# Patient Record
Sex: FEMALE | Race: WHITE | Employment: FULL TIME
[De-identification: names, ages, dates, MRNs, and addresses within clinical notes are randomized per-mention and may not be internally consistent; named-entity substitution may affect disease eponyms.]

---

## 2017-02-16 ENCOUNTER — TELEPHONE (OUTPATIENT)
Dept: FAMILY MEDICINE CLINIC | Facility: CLINIC | Age: 52
End: 2017-02-16

## 2017-04-12 ENCOUNTER — TELEPHONE (OUTPATIENT)
Dept: FAMILY MEDICINE CLINIC | Age: 52
End: 2017-04-12

## 2017-05-14 ENCOUNTER — APPOINTMENT (OUTPATIENT)
Dept: GENERAL RADIOLOGY | Age: 52
End: 2017-05-14

## 2017-05-14 ENCOUNTER — HOSPITAL ENCOUNTER (EMERGENCY)
Age: 52
Discharge: HOME | End: 2017-05-14
Attending: EMERGENCY MEDICINE

## 2017-05-14 VITALS
BODY MASS INDEX: 33.32 KG/M2 | DIASTOLIC BLOOD PRESSURE: 78 MMHG | TEMPERATURE: 98.8 F | OXYGEN SATURATION: 96 % | WEIGHT: 200 LBS | SYSTOLIC BLOOD PRESSURE: 132 MMHG | HEART RATE: 75 BPM | RESPIRATION RATE: 16 BRPM | HEIGHT: 65 IN

## 2017-05-14 DIAGNOSIS — R07.89 OTHER CHEST PAIN: Primary | ICD-10-CM

## 2017-05-14 DIAGNOSIS — E87.6 HYPOKALEMIA: ICD-10-CM

## 2017-05-14 DIAGNOSIS — E87.1 HYPONATREMIA: ICD-10-CM

## 2017-05-14 LAB
ABSOLUTE EOS #: 0.21 K/UL (ref 0–0.4)
ABSOLUTE LYMPH #: 6.36 K/UL (ref 1–4.8)
ABSOLUTE MONO #: 0.11 K/UL (ref 0.1–0.8)
ALBUMIN SERPL-MCNC: 4 G/DL (ref 3.5–5.2)
ALBUMIN/GLOBULIN RATIO: 1.2 (ref 1–2.5)
ALP BLD-CCNC: 86 U/L (ref 35–104)
ALT SERPL-CCNC: 15 U/L (ref 5–33)
ANION GAP SERPL CALCULATED.3IONS-SCNC: 12 MMOL/L (ref 9–17)
AST SERPL-CCNC: 20 U/L
BASOPHILS # BLD: 0 %
BASOPHILS ABSOLUTE: 0 K/UL (ref 0–0.2)
BILIRUB SERPL-MCNC: 0.18 MG/DL (ref 0.3–1.2)
BILIRUBIN DIRECT: <0.08 MG/DL
BILIRUBIN, INDIRECT: ABNORMAL MG/DL (ref 0–1)
BUN BLDV-MCNC: 10 MG/DL (ref 6–20)
BUN/CREAT BLD: ABNORMAL (ref 9–20)
CALCIUM SERPL-MCNC: 8.6 MG/DL (ref 8.6–10.4)
CHLORIDE BLD-SCNC: 99 MMOL/L (ref 98–107)
CO2: 23 MMOL/L (ref 20–31)
CREAT SERPL-MCNC: 0.55 MG/DL (ref 0.5–0.9)
DIFFERENTIAL TYPE: ABNORMAL
EOSINOPHILS RELATIVE PERCENT: 2 %
GFR AFRICAN AMERICAN: >60 ML/MIN
GFR NON-AFRICAN AMERICAN: >60 ML/MIN
GFR SERPL CREATININE-BSD FRML MDRD: ABNORMAL ML/MIN/{1.73_M2}
GFR SERPL CREATININE-BSD FRML MDRD: ABNORMAL ML/MIN/{1.73_M2}
GLOBULIN: ABNORMAL G/DL (ref 1.5–3.8)
GLUCOSE BLD-MCNC: 88 MG/DL (ref 70–99)
HCT VFR BLD CALC: 42.2 % (ref 36–46)
HEMOGLOBIN: 14 G/DL (ref 12–16)
LIPASE: 39 U/L (ref 13–60)
LYMPHOCYTES # BLD: 60 %
MCH RBC QN AUTO: 28.2 PG (ref 26–34)
MCHC RBC AUTO-ENTMCNC: 33.2 G/DL (ref 31–37)
MCV RBC AUTO: 85 FL (ref 80–100)
MONOCYTES # BLD: 1 %
MORPHOLOGY: NORMAL
PDW BLD-RTO: 14.3 % (ref 12.5–15.4)
PLATELET # BLD: 228 K/UL (ref 140–450)
PLATELET ESTIMATE: ABNORMAL
PMV BLD AUTO: 9.4 FL (ref 6–12)
POC TROPONIN I: 0 NG/ML (ref 0–0.1)
POC TROPONIN I: 0 NG/ML (ref 0–0.1)
POC TROPONIN INTERP: NORMAL
POC TROPONIN INTERP: NORMAL
POTASSIUM SERPL-SCNC: 3.6 MMOL/L (ref 3.7–5.3)
RBC # BLD: 4.96 M/UL (ref 4–5.2)
RBC # BLD: ABNORMAL 10*6/UL
SEG NEUTROPHILS: 37 %
SEGMENTED NEUTROPHILS ABSOLUTE COUNT: 3.92 K/UL (ref 1.8–7.7)
SODIUM BLD-SCNC: 134 MMOL/L (ref 135–144)
TOTAL PROTEIN: 7.3 G/DL (ref 6.4–8.3)
WBC # BLD: 10.6 K/UL (ref 3.5–11)
WBC # BLD: ABNORMAL 10*3/UL

## 2017-05-14 PROCEDURE — 85025 COMPLETE CBC W/AUTO DIFF WBC: CPT

## 2017-05-14 PROCEDURE — 80076 HEPATIC FUNCTION PANEL: CPT

## 2017-05-14 PROCEDURE — 93005 ELECTROCARDIOGRAM TRACING: CPT

## 2017-05-14 PROCEDURE — 80048 BASIC METABOLIC PNL TOTAL CA: CPT

## 2017-05-14 PROCEDURE — 71020 XR CHEST STANDARD TWO VW: CPT

## 2017-05-14 PROCEDURE — 84484 ASSAY OF TROPONIN QUANT: CPT

## 2017-05-14 PROCEDURE — 83690 ASSAY OF LIPASE: CPT

## 2017-05-14 PROCEDURE — 6370000000 HC RX 637 (ALT 250 FOR IP): Performed by: EMERGENCY MEDICINE

## 2017-05-14 PROCEDURE — 99285 EMERGENCY DEPT VISIT HI MDM: CPT

## 2017-05-14 RX ORDER — FAMOTIDINE 20 MG/1
20 TABLET, FILM COATED ORAL 2 TIMES DAILY
Qty: 60 TABLET | Refills: 0 | Status: SHIPPED | OUTPATIENT
Start: 2017-05-14 | End: 2017-05-19 | Stop reason: DRUGHIGH

## 2017-05-14 RX ORDER — FAMOTIDINE 20 MG/1
20 TABLET, FILM COATED ORAL ONCE
Status: COMPLETED | OUTPATIENT
Start: 2017-05-14 | End: 2017-05-14

## 2017-05-14 RX ADMIN — FAMOTIDINE 20 MG: 20 TABLET, FILM COATED ORAL at 12:59

## 2017-05-14 ASSESSMENT — PAIN DESCRIPTION - ORIENTATION: ORIENTATION: MID;LOWER

## 2017-05-14 ASSESSMENT — ENCOUNTER SYMPTOMS
CONSTIPATION: 0
SHORTNESS OF BREATH: 0
VOMITING: 0
DIARRHEA: 0
COLOR CHANGE: 0
NAUSEA: 0
COUGH: 0
ABDOMINAL PAIN: 1

## 2017-05-14 ASSESSMENT — PAIN DESCRIPTION - DESCRIPTORS: DESCRIPTORS: BURNING

## 2017-05-14 ASSESSMENT — PAIN DESCRIPTION - LOCATION: LOCATION: CHEST

## 2017-05-14 ASSESSMENT — PAIN DESCRIPTION - FREQUENCY: FREQUENCY: CONTINUOUS

## 2017-05-14 ASSESSMENT — PAIN DESCRIPTION - PAIN TYPE: TYPE: ACUTE PAIN

## 2017-05-14 ASSESSMENT — PAIN SCALES - GENERAL: PAINLEVEL_OUTOF10: 10

## 2017-05-15 LAB
EKG ATRIAL RATE: 76 BPM
EKG P AXIS: -4 DEGREES
EKG P-R INTERVAL: 144 MS
EKG Q-T INTERVAL: 392 MS
EKG QRS DURATION: 74 MS
EKG QTC CALCULATION (BAZETT): 441 MS
EKG R AXIS: 15 DEGREES
EKG T AXIS: 17 DEGREES
EKG VENTRICULAR RATE: 76 BPM

## 2017-05-19 ENCOUNTER — OFFICE VISIT (OUTPATIENT)
Dept: FAMILY MEDICINE CLINIC | Age: 52
End: 2017-05-19

## 2017-05-19 VITALS
TEMPERATURE: 97.7 F | SYSTOLIC BLOOD PRESSURE: 126 MMHG | HEART RATE: 80 BPM | WEIGHT: 208 LBS | DIASTOLIC BLOOD PRESSURE: 74 MMHG | HEIGHT: 65 IN | BODY MASS INDEX: 34.66 KG/M2

## 2017-05-19 DIAGNOSIS — K21.9 GASTROESOPHAGEAL REFLUX DISEASE WITHOUT ESOPHAGITIS: Primary | ICD-10-CM

## 2017-05-19 PROCEDURE — 99212 OFFICE O/P EST SF 10 MIN: CPT | Performed by: FAMILY MEDICINE

## 2017-05-19 RX ORDER — FAMOTIDINE 20 MG/1
20 TABLET, FILM COATED ORAL DAILY
COMMUNITY
End: 2019-03-05

## 2017-05-19 ASSESSMENT — PATIENT HEALTH QUESTIONNAIRE - PHQ9
2. FEELING DOWN, DEPRESSED OR HOPELESS: 0
SUM OF ALL RESPONSES TO PHQ QUESTIONS 1-9: 0
1. LITTLE INTEREST OR PLEASURE IN DOING THINGS: 0
SUM OF ALL RESPONSES TO PHQ9 QUESTIONS 1 & 2: 0

## 2017-05-19 ASSESSMENT — ENCOUNTER SYMPTOMS
ABDOMINAL PAIN: 0
SORE THROAT: 0
NAUSEA: 0
SHORTNESS OF BREATH: 1
WHEEZING: 0

## 2017-09-19 ENCOUNTER — TELEPHONE (OUTPATIENT)
Dept: FAMILY MEDICINE CLINIC | Age: 52
End: 2017-09-19

## 2018-06-18 ENCOUNTER — HOSPITAL ENCOUNTER (EMERGENCY)
Age: 53
Discharge: HOME | End: 2018-06-18
Attending: EMERGENCY MEDICINE

## 2018-06-18 VITALS
TEMPERATURE: 98.4 F | HEART RATE: 87 BPM | HEIGHT: 64 IN | WEIGHT: 206 LBS | BODY MASS INDEX: 35.17 KG/M2 | OXYGEN SATURATION: 96 % | SYSTOLIC BLOOD PRESSURE: 165 MMHG | DIASTOLIC BLOOD PRESSURE: 86 MMHG | RESPIRATION RATE: 16 BRPM

## 2018-06-18 DIAGNOSIS — M54.50 CHRONIC BILATERAL LOW BACK PAIN WITHOUT SCIATICA: Primary | ICD-10-CM

## 2018-06-18 DIAGNOSIS — G89.29 CHRONIC BILATERAL LOW BACK PAIN WITHOUT SCIATICA: Primary | ICD-10-CM

## 2018-06-18 LAB
-: ABNORMAL
AMORPHOUS: ABNORMAL
BACTERIA: ABNORMAL
BILIRUBIN URINE: NEGATIVE
CASTS UA: ABNORMAL /LPF (ref 0–8)
COLOR: YELLOW
CRYSTALS, UA: ABNORMAL /HPF
EPITHELIAL CELLS UA: ABNORMAL /HPF (ref 0–5)
GLUCOSE URINE: NEGATIVE
KETONES, URINE: NEGATIVE
LEUKOCYTE ESTERASE, URINE: NEGATIVE
MUCUS: ABNORMAL
NITRITE, URINE: NEGATIVE
OTHER OBSERVATIONS UA: ABNORMAL
PH UA: 5 (ref 5–8)
PROTEIN UA: NEGATIVE
RBC UA: ABNORMAL /HPF (ref 0–4)
RENAL EPITHELIAL, UA: ABNORMAL /HPF
SPECIFIC GRAVITY UA: 1.02 (ref 1–1.03)
TRICHOMONAS: ABNORMAL
TURBIDITY: CLEAR
URINE HGB: NEGATIVE
UROBILINOGEN, URINE: NORMAL
WBC UA: ABNORMAL /HPF (ref 0–5)
YEAST: ABNORMAL

## 2018-06-18 PROCEDURE — 81001 URINALYSIS AUTO W/SCOPE: CPT

## 2018-06-18 PROCEDURE — 6360000002 HC RX W HCPCS: Performed by: EMERGENCY MEDICINE

## 2018-06-18 PROCEDURE — 96372 THER/PROPH/DIAG INJ SC/IM: CPT

## 2018-06-18 PROCEDURE — 99283 EMERGENCY DEPT VISIT LOW MDM: CPT

## 2018-06-18 PROCEDURE — 87086 URINE CULTURE/COLONY COUNT: CPT

## 2018-06-18 RX ORDER — ORPHENADRINE CITRATE 30 MG/ML
60 INJECTION INTRAMUSCULAR; INTRAVENOUS ONCE
Status: COMPLETED | OUTPATIENT
Start: 2018-06-18 | End: 2018-06-18

## 2018-06-18 RX ORDER — CYCLOBENZAPRINE HCL 10 MG
10 TABLET ORAL 3 TIMES DAILY PRN
Qty: 15 TABLET | Refills: 0 | Status: SHIPPED | OUTPATIENT
Start: 2018-06-18 | End: 2018-06-28

## 2018-06-18 RX ADMIN — ORPHENADRINE CITRATE 60 MG: 30 INJECTION INTRAMUSCULAR; INTRAVENOUS at 11:15

## 2018-06-18 ASSESSMENT — PAIN SCALES - GENERAL: PAINLEVEL_OUTOF10: 9

## 2018-06-18 ASSESSMENT — PAIN DESCRIPTION - LOCATION: LOCATION: BACK

## 2018-06-18 ASSESSMENT — ENCOUNTER SYMPTOMS
NAUSEA: 0
BACK PAIN: 1
ABDOMINAL PAIN: 1
COUGH: 0
VOMITING: 0
SHORTNESS OF BREATH: 0

## 2018-06-18 ASSESSMENT — PAIN DESCRIPTION - ORIENTATION: ORIENTATION: LOWER

## 2018-06-18 ASSESSMENT — PAIN DESCRIPTION - PAIN TYPE: TYPE: CHRONIC PAIN

## 2018-06-18 ASSESSMENT — PAIN DESCRIPTION - DESCRIPTORS: DESCRIPTORS: SHARP

## 2018-06-19 LAB
CULTURE: NORMAL
Lab: NORMAL
SPECIMEN DESCRIPTION: NORMAL
STATUS: NORMAL

## 2018-06-21 ENCOUNTER — OFFICE VISIT (OUTPATIENT)
Dept: FAMILY MEDICINE CLINIC | Age: 53
End: 2018-06-21

## 2018-06-21 VITALS
HEART RATE: 82 BPM | RESPIRATION RATE: 18 BRPM | WEIGHT: 216 LBS | BODY MASS INDEX: 35.99 KG/M2 | TEMPERATURE: 98.7 F | OXYGEN SATURATION: 93 % | DIASTOLIC BLOOD PRESSURE: 78 MMHG | HEIGHT: 65 IN | SYSTOLIC BLOOD PRESSURE: 124 MMHG

## 2018-06-21 DIAGNOSIS — M54.50 MIDLINE LOW BACK PAIN WITHOUT SCIATICA, UNSPECIFIED CHRONICITY: Primary | ICD-10-CM

## 2018-06-21 PROCEDURE — 99212 OFFICE O/P EST SF 10 MIN: CPT | Performed by: FAMILY MEDICINE

## 2018-06-21 RX ORDER — IBUPROFEN 800 MG/1
800 TABLET ORAL EVERY 6 HOURS PRN
Qty: 30 TABLET | Refills: 0 | Status: SHIPPED | OUTPATIENT
Start: 2018-06-21 | End: 2018-08-05

## 2018-06-21 ASSESSMENT — ENCOUNTER SYMPTOMS
SORE THROAT: 0
BACK PAIN: 1
ABDOMINAL PAIN: 0
SHORTNESS OF BREATH: 0
NAUSEA: 0

## 2018-08-05 ENCOUNTER — HOSPITAL ENCOUNTER (EMERGENCY)
Age: 53
Discharge: HOME | End: 2018-08-05
Attending: EMERGENCY MEDICINE

## 2018-08-05 VITALS
OXYGEN SATURATION: 95 % | WEIGHT: 215 LBS | SYSTOLIC BLOOD PRESSURE: 126 MMHG | DIASTOLIC BLOOD PRESSURE: 95 MMHG | RESPIRATION RATE: 15 BRPM | BODY MASS INDEX: 35.82 KG/M2 | TEMPERATURE: 98 F | HEART RATE: 84 BPM | HEIGHT: 65 IN

## 2018-08-05 DIAGNOSIS — S29.012A RHOMBOID MUSCLE STRAIN, INITIAL ENCOUNTER: Primary | ICD-10-CM

## 2018-08-05 PROCEDURE — 96372 THER/PROPH/DIAG INJ SC/IM: CPT

## 2018-08-05 PROCEDURE — 6360000002 HC RX W HCPCS: Performed by: EMERGENCY MEDICINE

## 2018-08-05 PROCEDURE — 99282 EMERGENCY DEPT VISIT SF MDM: CPT

## 2018-08-05 RX ORDER — IBUPROFEN 600 MG/1
600 TABLET ORAL ONCE
Status: DISCONTINUED | OUTPATIENT
Start: 2018-08-05 | End: 2018-08-05

## 2018-08-05 RX ORDER — IBUPROFEN 600 MG/1
600 TABLET ORAL EVERY 6 HOURS PRN
Qty: 30 TABLET | Refills: 0 | Status: ON HOLD | OUTPATIENT
Start: 2018-08-05 | End: 2021-06-30 | Stop reason: ALTCHOICE

## 2018-08-05 RX ORDER — KETOROLAC TROMETHAMINE 30 MG/ML
60 INJECTION, SOLUTION INTRAMUSCULAR; INTRAVENOUS ONCE
Status: COMPLETED | OUTPATIENT
Start: 2018-08-05 | End: 2018-08-05

## 2018-08-05 RX ADMIN — KETOROLAC TROMETHAMINE 60 MG: 30 INJECTION, SOLUTION INTRAMUSCULAR at 19:21

## 2018-08-05 ASSESSMENT — ENCOUNTER SYMPTOMS
COUGH: 0
NAUSEA: 0
EYE REDNESS: 0
EYE PAIN: 0
RHINORRHEA: 0
ABDOMINAL PAIN: 0
SHORTNESS OF BREATH: 0
VOMITING: 0
BACK PAIN: 1

## 2018-08-05 ASSESSMENT — PAIN DESCRIPTION - ORIENTATION: ORIENTATION: RIGHT

## 2018-08-05 ASSESSMENT — PAIN SCALES - GENERAL
PAINLEVEL_OUTOF10: 7
PAINLEVEL_OUTOF10: 9

## 2018-08-05 ASSESSMENT — PAIN DESCRIPTION - PAIN TYPE: TYPE: ACUTE PAIN

## 2018-08-05 ASSESSMENT — PAIN DESCRIPTION - LOCATION: LOCATION: RIB CAGE

## 2018-08-05 ASSESSMENT — PAIN DESCRIPTION - DESCRIPTORS: DESCRIPTORS: CRAMPING

## 2018-08-05 NOTE — ED PROVIDER NOTES
[address] [facility] [address]  eMERGENCY dEPARTMENT eNCOUnter      Pt Name: Raeann Runner  MRN: [id number]  [place] 1965  Date of evaluation: 2018  PCP:    Escobar Brock MD      [address] [place] [address]       Chief Complaint   Patient presents with    Rib Pain     right sided         HISTORY OF PRESENT ILLNESS      Raeann Runner is a 47 y.o. female who presents For evaluation for back pain. Patient states she's affecting his left couple days. She states it's right the mid upper back. States it hurts when she moves her arm and back. Denies any specific trauma however she could've pulled something. Denies any radiation to the chest the chest or shortness of breath. Otherwise no other new complaints       REVIEW OF SYSTEMS         Review of Systems   Constitutional: Negative for chills and fever. HENT: Negative for congestion and rhinorrhea. Eyes: Negative for pain and redness. Respiratory: Negative for cough and shortness of breath. Cardiovascular: Negative for chest pain and palpitations. Gastrointestinal: Negative for abdominal pain, nausea and vomiting. Genitourinary: Negative for dysuria, flank pain and urgency. Musculoskeletal: Positive for back pain. Negative for myalgias, neck pain and neck stiffness. Skin: Negative for rash. Neurological: Negative for light-headedness and headaches. Hematological: Does not bruise/bleed easily.           PAST MEDICAL HISTORY     Past Medical History:   Diagnosis Date    Arthritis     Gastroesophageal reflux disease without esophagitis 2017    H/O vaginal hysterectomy 2006    Tobacco abuse     Vision abnormalities     glasses for reading       SURGICAL HISTORY       Past Surgical History:   Procedure Laterality Date    BREAST SURGERY Left     biopsy, neg     SECTION      x 3    COLONOSCOPY  8/27/15    Dr Misa Castillo Right     inguinal    HYSTERECTOMY      partial-still has one ovary    TONSILLECTOMY

## 2018-08-05 NOTE — LETTER
[facility] [address] [id number]  Phone: [phone]               August 5, 2018    Patient: Angel Bustillo   YOB: 1965   Date of Visit: 8/5/2018       To Whom It May Concern:    Rita Solomon was seen and treated in our emergency department on 8/5/2018. She strained a muscle and may return to work on 8/7/2018.     Sincerely,       Sobia Padilla RN         Signature:__________________________________

## 2018-10-25 ENCOUNTER — TELEPHONE (OUTPATIENT)
Dept: FAMILY MEDICINE CLINIC | Age: 53
End: 2018-10-25

## 2018-10-25 NOTE — TELEPHONE ENCOUNTER
I left a message to let her know she is due for her annual mammogram and to see if she is following Dr. Nabil Brown. Last visit with Dr. Nabil Brown is 5/2017.  sma Emi

## 2019-03-05 ENCOUNTER — OFFICE VISIT (OUTPATIENT)
Dept: OBGYN CLINIC | Age: 54
End: 2019-03-05
Payer: COMMERCIAL

## 2019-03-05 ENCOUNTER — HOSPITAL ENCOUNTER (OUTPATIENT)
Age: 54
Setting detail: SPECIMEN
Discharge: HOME | End: 2019-03-05
Payer: COMMERCIAL

## 2019-03-05 VITALS
WEIGHT: 216 LBS | DIASTOLIC BLOOD PRESSURE: 70 MMHG | HEART RATE: 82 BPM | HEIGHT: 65 IN | BODY MASS INDEX: 35.99 KG/M2 | SYSTOLIC BLOOD PRESSURE: 110 MMHG

## 2019-03-05 DIAGNOSIS — N95.2 ATROPHY OF VAGINA: ICD-10-CM

## 2019-03-05 DIAGNOSIS — N94.10 DYSPAREUNIA, FEMALE: ICD-10-CM

## 2019-03-05 DIAGNOSIS — N94.10 DYSPAREUNIA, FEMALE: Primary | ICD-10-CM

## 2019-03-05 DIAGNOSIS — Z00.00 PREVENTATIVE HEALTH CARE: ICD-10-CM

## 2019-03-05 DIAGNOSIS — Z90.710 H/O VAGINAL HYSTERECTOMY: ICD-10-CM

## 2019-03-05 LAB
BILIRUBIN URINE: NEGATIVE
COLOR: YELLOW
COMMENT UA: NORMAL
GLUCOSE URINE: NEGATIVE
KETONES, URINE: NEGATIVE
LEUKOCYTE ESTERASE, URINE: NEGATIVE
NITRITE, URINE: NEGATIVE
PH UA: 6 (ref 5–8)
PROTEIN UA: NEGATIVE
SPECIFIC GRAVITY UA: 1.01 (ref 1–1.03)
TURBIDITY: CLEAR
URINE HGB: NEGATIVE
UROBILINOGEN, URINE: NORMAL

## 2019-03-05 PROCEDURE — 87624 HPV HI-RISK TYP POOLED RSLT: CPT

## 2019-03-05 PROCEDURE — G0145 SCR C/V CYTO,THINLAYER,RESCR: HCPCS

## 2019-03-05 PROCEDURE — 99386 PREV VISIT NEW AGE 40-64: CPT | Performed by: OBSTETRICS & GYNECOLOGY

## 2019-03-05 PROCEDURE — 81003 URINALYSIS AUTO W/O SCOPE: CPT

## 2019-03-06 LAB
COMMENT: NORMAL
HPV SAMPLE: NORMAL
HPV, GENOTYPE 16: NOT DETECTED
HPV, GENOTYPE 18: NOT DETECTED
HPV, HIGH RISK OTHER: NOT DETECTED
HPV, INTERPRETATION: NORMAL
SPECIMEN DESCRIPTION: NORMAL

## 2019-03-15 LAB — CYTOLOGY REPORT: NORMAL

## 2019-04-08 ENCOUNTER — TELEPHONE (OUTPATIENT)
Dept: OBGYN CLINIC | Age: 54
End: 2019-04-08

## 2019-04-08 NOTE — TELEPHONE ENCOUNTER
Left message to call office. Pt has appointment 4-9-19 for f/u but has not completed the US ordered.

## 2020-05-28 ENCOUNTER — HOSPITAL ENCOUNTER (EMERGENCY)
Age: 55
Discharge: HOME | End: 2020-05-28
Attending: EMERGENCY MEDICINE

## 2020-05-28 ENCOUNTER — APPOINTMENT (OUTPATIENT)
Dept: GENERAL RADIOLOGY | Age: 55
End: 2020-05-28

## 2020-05-28 VITALS
RESPIRATION RATE: 18 BRPM | WEIGHT: 215 LBS | OXYGEN SATURATION: 96 % | BODY MASS INDEX: 35.82 KG/M2 | HEIGHT: 65 IN | SYSTOLIC BLOOD PRESSURE: 178 MMHG | TEMPERATURE: 97.8 F | DIASTOLIC BLOOD PRESSURE: 90 MMHG | HEART RATE: 90 BPM

## 2020-05-28 PROCEDURE — 73562 X-RAY EXAM OF KNEE 3: CPT

## 2020-05-28 PROCEDURE — 6370000000 HC RX 637 (ALT 250 FOR IP): Performed by: EMERGENCY MEDICINE

## 2020-05-28 PROCEDURE — 99283 EMERGENCY DEPT VISIT LOW MDM: CPT

## 2020-05-28 RX ORDER — HYDROCODONE BITARTRATE AND ACETAMINOPHEN 5; 325 MG/1; MG/1
1 TABLET ORAL EVERY 6 HOURS PRN
Qty: 6 TABLET | Refills: 0 | Status: SHIPPED | OUTPATIENT
Start: 2020-05-28 | End: 2020-05-30

## 2020-05-28 RX ORDER — HYDROCODONE BITARTRATE AND ACETAMINOPHEN 5; 325 MG/1; MG/1
1 TABLET ORAL ONCE
Status: COMPLETED | OUTPATIENT
Start: 2020-05-28 | End: 2020-05-28

## 2020-05-28 RX ADMIN — HYDROCODONE BITARTRATE AND ACETAMINOPHEN 1 TABLET: 5; 325 TABLET ORAL at 12:11

## 2020-05-28 ASSESSMENT — PAIN SCALES - GENERAL: PAINLEVEL_OUTOF10: 10

## 2020-05-28 ASSESSMENT — PAIN DESCRIPTION - LOCATION: LOCATION: KNEE

## 2020-05-28 ASSESSMENT — ENCOUNTER SYMPTOMS
SHORTNESS OF BREATH: 0
EYE PAIN: 0
NAUSEA: 0
BACK PAIN: 0
VOMITING: 0
COUGH: 0
RHINORRHEA: 0
ABDOMINAL PAIN: 0
EYE DISCHARGE: 0
COLOR CHANGE: 0
SORE THROAT: 0

## 2020-05-28 ASSESSMENT — PAIN DESCRIPTION - PAIN TYPE: TYPE: ACUTE PAIN

## 2020-05-28 ASSESSMENT — PAIN DESCRIPTION - ORIENTATION: ORIENTATION: LEFT

## 2020-05-28 NOTE — ED NOTES
Pt to ED via wheelchair with complaints of left knee pain and swelling. Pt states it started about 2 days ago and the swelling and pain has gotten worse. Pt denies injury to knee. Pt states she has a history of \"bad knees\" but has never had this type of swelling or pain. Pt with decreased ROM. Pt has full sensation and PMS intact. Vitals obtained. Awaiting further orders.      Darrick Shone, RN  05/28/20 9233

## 2020-05-29 ENCOUNTER — TELEPHONE (OUTPATIENT)
Dept: FAMILY MEDICINE CLINIC | Age: 55
End: 2020-05-29

## 2020-06-01 ENCOUNTER — TELEPHONE (OUTPATIENT)
Dept: FAMILY MEDICINE CLINIC | Age: 55
End: 2020-06-01

## 2021-06-29 ENCOUNTER — HOSPITAL ENCOUNTER (INPATIENT)
Age: 56
LOS: 3 days | Discharge: HOME | DRG: 247 | End: 2021-07-02
Attending: EMERGENCY MEDICINE | Admitting: EMERGENCY MEDICINE

## 2021-06-29 ENCOUNTER — APPOINTMENT (OUTPATIENT)
Dept: GENERAL RADIOLOGY | Age: 56
DRG: 247 | End: 2021-06-29

## 2021-06-29 DIAGNOSIS — R07.9 CHEST PAIN, UNSPECIFIED TYPE: Primary | ICD-10-CM

## 2021-06-29 LAB
ABSOLUTE EOS #: 0.13 K/UL (ref 0–0.4)
ABSOLUTE IMMATURE GRANULOCYTE: 0 K/UL (ref 0–0.3)
ABSOLUTE LYMPH #: 4.94 K/UL (ref 1–4.8)
ABSOLUTE MONO #: 0.78 K/UL (ref 0.1–0.8)
ANION GAP SERPL CALCULATED.3IONS-SCNC: 11 MMOL/L (ref 9–17)
BASOPHILS # BLD: 0 % (ref 0–2)
BASOPHILS ABSOLUTE: 0 K/UL (ref 0–0.2)
BNP INTERPRETATION: NORMAL
BUN BLDV-MCNC: 14 MG/DL (ref 6–20)
BUN/CREAT BLD: ABNORMAL (ref 9–20)
CALCIUM SERPL-MCNC: 9.1 MG/DL (ref 8.6–10.4)
CHLORIDE BLD-SCNC: 105 MMOL/L (ref 98–107)
CO2: 25 MMOL/L (ref 20–31)
CREAT SERPL-MCNC: 0.82 MG/DL (ref 0.5–0.9)
DIFFERENTIAL TYPE: ABNORMAL
EOSINOPHILS RELATIVE PERCENT: 1 % (ref 1–4)
GFR AFRICAN AMERICAN: >60 ML/MIN
GFR NON-AFRICAN AMERICAN: >60 ML/MIN
GFR SERPL CREATININE-BSD FRML MDRD: ABNORMAL ML/MIN/{1.73_M2}
GFR SERPL CREATININE-BSD FRML MDRD: ABNORMAL ML/MIN/{1.73_M2}
GLUCOSE BLD-MCNC: 170 MG/DL (ref 70–99)
HCT VFR BLD CALC: 43.3 % (ref 36.3–47.1)
HEMOGLOBIN: 13.7 G/DL (ref 11.9–15.1)
IMMATURE GRANULOCYTES: 0 %
LYMPHOCYTES # BLD: 38 % (ref 24–44)
MCH RBC QN AUTO: 27.7 PG (ref 25.2–33.5)
MCHC RBC AUTO-ENTMCNC: 31.6 G/DL (ref 28.4–34.8)
MCV RBC AUTO: 87.7 FL (ref 82.6–102.9)
MONOCYTES # BLD: 6 % (ref 1–7)
MORPHOLOGY: NORMAL
NRBC AUTOMATED: 0 PER 100 WBC
PDW BLD-RTO: 13.2 % (ref 11.8–14.4)
PLATELET # BLD: 280 K/UL (ref 138–453)
PLATELET ESTIMATE: ABNORMAL
PMV BLD AUTO: 10.6 FL (ref 8.1–13.5)
POTASSIUM SERPL-SCNC: 3.9 MMOL/L (ref 3.7–5.3)
PRO-BNP: 35 PG/ML
RBC # BLD: 4.94 M/UL (ref 3.95–5.11)
RBC # BLD: ABNORMAL 10*6/UL
SEG NEUTROPHILS: 55 % (ref 36–66)
SEGMENTED NEUTROPHILS ABSOLUTE COUNT: 7.15 K/UL (ref 1.8–7.7)
SODIUM BLD-SCNC: 141 MMOL/L (ref 135–144)
TROPONIN INTERP: NORMAL
TROPONIN T: NORMAL NG/ML
TROPONIN, HIGH SENSITIVITY: 11 NG/L (ref 0–14)
WBC # BLD: 13 K/UL (ref 3.5–11.3)
WBC # BLD: ABNORMAL 10*3/UL

## 2021-06-29 PROCEDURE — 85025 COMPLETE CBC W/AUTO DIFF WBC: CPT

## 2021-06-29 PROCEDURE — 83690 ASSAY OF LIPASE: CPT

## 2021-06-29 PROCEDURE — 99283 EMERGENCY DEPT VISIT LOW MDM: CPT

## 2021-06-29 PROCEDURE — 71046 X-RAY EXAM CHEST 2 VIEWS: CPT

## 2021-06-29 PROCEDURE — 6370000000 HC RX 637 (ALT 250 FOR IP): Performed by: STUDENT IN AN ORGANIZED HEALTH CARE EDUCATION/TRAINING PROGRAM

## 2021-06-29 PROCEDURE — 1200000000 HC SEMI PRIVATE

## 2021-06-29 PROCEDURE — 84484 ASSAY OF TROPONIN QUANT: CPT

## 2021-06-29 PROCEDURE — 83880 ASSAY OF NATRIURETIC PEPTIDE: CPT

## 2021-06-29 PROCEDURE — 80048 BASIC METABOLIC PNL TOTAL CA: CPT

## 2021-06-29 PROCEDURE — 93005 ELECTROCARDIOGRAM TRACING: CPT | Performed by: EMERGENCY MEDICINE

## 2021-06-29 RX ORDER — ASPIRIN 81 MG/1
324 TABLET, CHEWABLE ORAL ONCE
Status: COMPLETED | OUTPATIENT
Start: 2021-06-29 | End: 2021-06-29

## 2021-06-29 RX ADMIN — ASPIRIN 324 MG: 81 TABLET, CHEWABLE ORAL at 21:45

## 2021-06-29 ASSESSMENT — PAIN SCALES - GENERAL: PAINLEVEL_OUTOF10: 8

## 2021-06-29 ASSESSMENT — PAIN DESCRIPTION - PAIN TYPE: TYPE: ACUTE PAIN

## 2021-06-29 ASSESSMENT — PAIN DESCRIPTION - LOCATION: LOCATION: CHEST

## 2021-06-29 ASSESSMENT — PAIN DESCRIPTION - DESCRIPTORS: DESCRIPTORS: ACHING

## 2021-06-29 ASSESSMENT — HEART SCORE
ECG: 1
ECG: 1

## 2021-06-29 ASSESSMENT — PAIN DESCRIPTION - ORIENTATION: ORIENTATION: LEFT

## 2021-06-30 ENCOUNTER — APPOINTMENT (OUTPATIENT)
Dept: NUCLEAR MEDICINE | Age: 56
DRG: 247 | End: 2021-06-30

## 2021-06-30 LAB
EKG ATRIAL RATE: 72 BPM
EKG ATRIAL RATE: 87 BPM
EKG P AXIS: 30 DEGREES
EKG P AXIS: 34 DEGREES
EKG P-R INTERVAL: 154 MS
EKG P-R INTERVAL: 162 MS
EKG Q-T INTERVAL: 364 MS
EKG Q-T INTERVAL: 406 MS
EKG QRS DURATION: 72 MS
EKG QRS DURATION: 74 MS
EKG QTC CALCULATION (BAZETT): 438 MS
EKG QTC CALCULATION (BAZETT): 444 MS
EKG R AXIS: 35 DEGREES
EKG R AXIS: 41 DEGREES
EKG T AXIS: 57 DEGREES
EKG T AXIS: 60 DEGREES
EKG VENTRICULAR RATE: 72 BPM
EKG VENTRICULAR RATE: 87 BPM
LIPASE: 35 U/L (ref 13–60)
LV EF: 59 %
LVEF MODALITY: NORMAL
SARS-COV-2, RAPID: NOT DETECTED
SPECIMEN DESCRIPTION: NORMAL
TROPONIN INTERP: NORMAL
TROPONIN T: NORMAL NG/ML
TROPONIN, HIGH SENSITIVITY: 9 NG/L (ref 0–14)

## 2021-06-30 PROCEDURE — 93005 ELECTROCARDIOGRAM TRACING: CPT | Performed by: STUDENT IN AN ORGANIZED HEALTH CARE EDUCATION/TRAINING PROGRAM

## 2021-06-30 PROCEDURE — G0378 HOSPITAL OBSERVATION PER HR: HCPCS

## 2021-06-30 PROCEDURE — 2580000003 HC RX 258: Performed by: STUDENT IN AN ORGANIZED HEALTH CARE EDUCATION/TRAINING PROGRAM

## 2021-06-30 PROCEDURE — 2580000003 HC RX 258: Performed by: INTERNAL MEDICINE

## 2021-06-30 PROCEDURE — 1200000000 HC SEMI PRIVATE

## 2021-06-30 PROCEDURE — A9500 TC99M SESTAMIBI: HCPCS | Performed by: INTERNAL MEDICINE

## 2021-06-30 PROCEDURE — 3430000000 HC RX DIAGNOSTIC RADIOPHARMACEUTICAL: Performed by: INTERNAL MEDICINE

## 2021-06-30 PROCEDURE — 87635 SARS-COV-2 COVID-19 AMP PRB: CPT

## 2021-06-30 PROCEDURE — 78452 HT MUSCLE IMAGE SPECT MULT: CPT

## 2021-06-30 PROCEDURE — 6360000002 HC RX W HCPCS: Performed by: INTERNAL MEDICINE

## 2021-06-30 PROCEDURE — 93017 CV STRESS TEST TRACING ONLY: CPT

## 2021-06-30 RX ORDER — METOPROLOL TARTRATE 5 MG/5ML
5 INJECTION INTRAVENOUS EVERY 5 MIN PRN
Status: DISCONTINUED | OUTPATIENT
Start: 2021-06-30 | End: 2021-06-30 | Stop reason: ALTCHOICE

## 2021-06-30 RX ORDER — SODIUM CHLORIDE 9 MG/ML
25 INJECTION, SOLUTION INTRAVENOUS PRN
Status: DISCONTINUED | OUTPATIENT
Start: 2021-06-30 | End: 2021-07-02 | Stop reason: HOSPADM

## 2021-06-30 RX ORDER — ATROPINE SULFATE 0.1 MG/ML
0.5 INJECTION INTRAVENOUS EVERY 5 MIN PRN
Status: DISCONTINUED | OUTPATIENT
Start: 2021-06-30 | End: 2021-06-30 | Stop reason: ALTCHOICE

## 2021-06-30 RX ORDER — AMINOPHYLLINE DIHYDRATE 25 MG/ML
50 INJECTION, SOLUTION INTRAVENOUS PRN
Status: DISCONTINUED | OUTPATIENT
Start: 2021-06-30 | End: 2021-06-30 | Stop reason: ALTCHOICE

## 2021-06-30 RX ORDER — ONDANSETRON 2 MG/ML
4 INJECTION INTRAMUSCULAR; INTRAVENOUS EVERY 6 HOURS PRN
Status: DISCONTINUED | OUTPATIENT
Start: 2021-06-30 | End: 2021-07-02 | Stop reason: HOSPADM

## 2021-06-30 RX ORDER — SODIUM CHLORIDE 0.9 % (FLUSH) 0.9 %
5-40 SYRINGE (ML) INJECTION PRN
Status: DISCONTINUED | OUTPATIENT
Start: 2021-06-30 | End: 2021-07-02 | Stop reason: HOSPADM

## 2021-06-30 RX ORDER — ONDANSETRON 4 MG/1
4 TABLET, ORALLY DISINTEGRATING ORAL EVERY 8 HOURS PRN
Status: DISCONTINUED | OUTPATIENT
Start: 2021-06-30 | End: 2021-07-02 | Stop reason: HOSPADM

## 2021-06-30 RX ORDER — ACETAMINOPHEN 325 MG/1
650 TABLET ORAL EVERY 4 HOURS PRN
Status: DISCONTINUED | OUTPATIENT
Start: 2021-06-30 | End: 2021-07-02 | Stop reason: HOSPADM

## 2021-06-30 RX ORDER — SODIUM CHLORIDE 0.9 % (FLUSH) 0.9 %
5-40 SYRINGE (ML) INJECTION PRN
Status: DISCONTINUED | OUTPATIENT
Start: 2021-06-30 | End: 2021-06-30 | Stop reason: ALTCHOICE

## 2021-06-30 RX ORDER — SODIUM CHLORIDE 9 MG/ML
500 INJECTION, SOLUTION INTRAVENOUS CONTINUOUS PRN
Status: DISCONTINUED | OUTPATIENT
Start: 2021-06-30 | End: 2021-06-30 | Stop reason: ALTCHOICE

## 2021-06-30 RX ORDER — NITROGLYCERIN 0.4 MG/1
0.4 TABLET SUBLINGUAL EVERY 5 MIN PRN
Status: DISCONTINUED | OUTPATIENT
Start: 2021-06-30 | End: 2021-06-30 | Stop reason: ALTCHOICE

## 2021-06-30 RX ORDER — ALBUTEROL SULFATE 90 UG/1
2 AEROSOL, METERED RESPIRATORY (INHALATION) PRN
Status: DISCONTINUED | OUTPATIENT
Start: 2021-06-30 | End: 2021-06-30 | Stop reason: ALTCHOICE

## 2021-06-30 RX ORDER — SODIUM CHLORIDE 0.9 % (FLUSH) 0.9 %
5-40 SYRINGE (ML) INJECTION EVERY 12 HOURS SCHEDULED
Status: DISCONTINUED | OUTPATIENT
Start: 2021-06-30 | End: 2021-07-02 | Stop reason: HOSPADM

## 2021-06-30 RX ADMIN — SODIUM CHLORIDE, PRESERVATIVE FREE 10 ML: 5 INJECTION INTRAVENOUS at 08:25

## 2021-06-30 RX ADMIN — TETRAKIS(2-METHOXYISOBUTYLISOCYANIDE)COPPER(I) TETRAFLUOROBORATE 14.1 MILLICURIE: 1 INJECTION, POWDER, LYOPHILIZED, FOR SOLUTION INTRAVENOUS at 12:45

## 2021-06-30 RX ADMIN — TETRAKIS(2-METHOXYISOBUTYLISOCYANIDE)COPPER(I) TETRAFLUOROBORATE 49.1 MILLICURIE: 1 INJECTION, POWDER, LYOPHILIZED, FOR SOLUTION INTRAVENOUS at 14:13

## 2021-06-30 RX ADMIN — SODIUM CHLORIDE, PRESERVATIVE FREE 10 ML: 5 INJECTION INTRAVENOUS at 21:01

## 2021-06-30 RX ADMIN — REGADENOSON 0.4 MG: 0.08 INJECTION, SOLUTION INTRAVENOUS at 12:44

## 2021-06-30 RX ADMIN — SODIUM CHLORIDE, PRESERVATIVE FREE 10 ML: 5 INJECTION INTRAVENOUS at 12:44

## 2021-06-30 ASSESSMENT — ENCOUNTER SYMPTOMS
ABDOMINAL PAIN: 0
NAUSEA: 0
CHEST TIGHTNESS: 1
SHORTNESS OF BREATH: 1
VOMITING: 0

## 2021-06-30 ASSESSMENT — PAIN DESCRIPTION - ORIENTATION
ORIENTATION: LEFT
ORIENTATION: LEFT

## 2021-06-30 ASSESSMENT — PAIN SCALES - GENERAL
PAINLEVEL_OUTOF10: 5
PAINLEVEL_OUTOF10: 3
PAINLEVEL_OUTOF10: 5
PAINLEVEL_OUTOF10: 0
PAINLEVEL_OUTOF10: 0

## 2021-06-30 ASSESSMENT — PAIN DESCRIPTION - LOCATION
LOCATION: CHEST

## 2021-06-30 ASSESSMENT — PAIN DESCRIPTION - DESCRIPTORS
DESCRIPTORS: ACHING
DESCRIPTORS: ACHING

## 2021-06-30 ASSESSMENT — PAIN DESCRIPTION - PAIN TYPE
TYPE: ACUTE PAIN

## 2021-06-30 NOTE — PROGRESS NOTES
[address]  CDU / OBSERVATION ENCOUNTER  ATTENDING NOTE       I performed a history and physical examination of the patient and discussed management with the resident or midlevel provider. I reviewed the resident or midlevel provider's note and agree with the documented findings and plan of care. Any areas of disagreement are noted on the chart. I was personally present for the key portions of any procedures. I have documented in the chart those procedures where I was not present during the key portions. I have reviewed the nurses notes. I agree with the chief complaint, past medical history, past surgical history, allergies, medications, social and family history as documented unless otherwise noted below. The Family history, social history, and ROS are effectively unchanged since admission unless noted elsewhere in the chart. Patient evaluated by cardiology. Patient had stress testing ordered. Patient comfortable now but stress testing positive for reversible ischemia. Patient for reevaluation by cardiology for catheterization tomorrow.     Bita Hope MD  Attending Emergency  Physician

## 2021-06-30 NOTE — CONSULTS
Attestation signed by      Attending Physician Statement:    I have discussed the care of  Demaris Runner , including pertinent history and exam findings, with the Cardiology fellow/resident. I have seen and examined the patient and the key elements of all parts of the encounter have been performed by me. I agree with the assessment, plan and orders as documented by the fellow/resident, after I modified exam findings and plan of treatments, and the final version is my approved version of the assessment. Additional Comments:   Atypical chest pain, negative tropes normal EKG  Given the risk factor plan to do Lexiscan stress test  Talk in detail regarding quitting smoking  On arrival the blood pressure was very high, patient is advised to check blood pressure and keep a record  As patient has back pain that pain can also aggravate blood pressures  If the stress test is negative can be discharged  According patient had a cath done in 2013 does not required any stent, cannot find the report  Dr. Aleshia Lima Cardiology Cardiology    Consult / H&P               Today's Date: 6/30/2021  Patient Name: Demaris Runner  Date of admission: 6/29/2021  8:45 PM  Patient's age: 63 y. o., 1965  Admission Dx: Chest pain [R07.9]    Reason for Consult:  Cardiac evaluation    Requesting Physician: Candida Vasques MD    CHIEF COMPLAINT:  Chest pressure/pain    History Obtained From:  patient    HISTORY OF PRESENT ILLNESS:      Patient is a 58-year-old female who came to the ED yesterday with complaints of chest pressure. Pressure started after she ate some ice cream, no physical exertion. Patient states the pressure is still present but not as bad, it is intermittent. Rates the pressure at the level of 5 out of 10. Pressure is nonradiating, located substernally. No associated shortness of breath, diaphoresis, nausea or vomiting.   EKG in ED showed sinus rhythm with low QRS voltage, no signs of ischemic changes. Troponin levels have been negative x2, 11 then 9. Cardiac history: Per patient and her  patient had cardiac cath with no stent placement in 2013, unable to find the results of that test.  Has not had any cardiac diagnostic testing since then. Past Medical History:   has a past medical history of Arthritis, Gastroesophageal reflux disease without esophagitis, H/O vaginal hysterectomy, Tobacco abuse, and Vision abnormalities. Past Surgical History:   has a past surgical history that includes Tonsillectomy;  section; Hysterectomy; hernia repair (Right); Breast surgery (Left); and Colonoscopy (8/27/15). Home Medications:    Prior to Admission medications    Not on File      Current Facility-Administered Medications: sodium chloride flush 0.9 % injection 5-40 mL, 5-40 mL, Intravenous, 2 times per day  sodium chloride flush 0.9 % injection 5-40 mL, 5-40 mL, Intravenous, PRN  0.9 % sodium chloride infusion, 25 mL, Intravenous, PRN  enoxaparin (LOVENOX) injection 40 mg, 40 mg, Subcutaneous, Daily  acetaminophen (TYLENOL) tablet 650 mg, 650 mg, Oral, Q4H PRN  ondansetron (ZOFRAN-ODT) disintegrating tablet 4 mg, 4 mg, Oral, Q8H PRN **OR** ondansetron (ZOFRAN) injection 4 mg, 4 mg, Intravenous, Q6H PRN    Allergies:  Patient has no known allergies. Social History:   reports that she has been smoking cigarettes. She has been smoking about 1.00 pack per day. She has never used smokeless tobacco. She reports current alcohol use. She reports that she does not use drugs. Family History: family history includes High Cholesterol in her brother; Hypertension in her brother; Stroke in her mother. No h/o sudden cardiac death. No for premature CAD    REVIEW OF SYSTEMS:    · Constitutional: there has been no unanticipated weight loss. There's been No change in energy level, No change in activity level. · Eyes: No visual changes or diplopia. No scleral icterus.   · ENT: No Headaches  · Cardiovascular: No cardiac history  · Respiratory: No previous pulmonary problems, No cough  · Gastrointestinal: No abdominal pain. No change in bowel or bladder habits. · Genitourinary: No dysuria, trouble voiding, or hematuria. · Musculoskeletal:  No gait disturbance, No weakness or joint complaints. · Integumentary: No rash or pruritis. · Neurological: No headache, diplopia, change in muscle strength, numbness or tingling. No change in gait, balance, coordination, mood, affect, memory, mentation, behavior. · Psychiatric: No anxiety, or depression. · Endocrine: No temperature intolerance. No excessive thirst, fluid intake, or urination. No tremor. · Hematologic/Lymphatic: No abnormal bruising or bleeding, blood clots or swollen lymph nodes. · Allergic/Immunologic: No nasal congestion or hives. PHYSICAL EXAM:      /75   Pulse 75   Temp 98.7 °F (37.1 °C) (Oral)   Resp 18   Ht 5' 5\" (1.651 m)   Wt 215 lb (97.5 kg)   SpO2 96%   BMI 35.78 kg/m²    Constitutional and General Appearance: alert, cooperative, no distress and appears stated age  HEENT: PERRL, no cervical lymphadenopathy. No masses palpable. Normal oral mucosa  Respiratory:  · Normal excursion and expansion without use of accessory muscles  · Resp Auscultation: Good respiratory effort. No for increased work of breathing. On auscultation: clear to auscultation bilaterally  Cardiovascular:  · The apical impulse is not displaced  · Heart tones are crisp and normal. regular S1 and S2.  · Jugular venous pulsation Normal  · The carotid upstroke is normal in amplitude and contour without delay or bruit  · Peripheral pulses are symmetrical and full   Abdomen:   · No masses or tenderness  · Bowel sounds present  Extremities:  ·  No Cyanosis or Clubbing  ·  Lower extremity edema: No  ·  Skin: Warm and dry  Neurological:  · Alert and oriented.   · Moves all extremities well  · No abnormalities of mood, affect, memory, mentation, or behavior are noted    DATA:    Diagnostics:    EKG: normal EKG, normal sinus rhythm. ECHO: not obtained. Cardiac Angiography: not obtained. Labs:     CBC:   Recent Labs     06/29/21 2101   WBC 13.0*   HGB 13.7   HCT 43.3        BMP:   Recent Labs     06/29/21 2101      K 3.9   CO2 25   BUN 14   CREATININE 0.82   LABGLOM >60   GLUCOSE 170*     BNP: No results for input(s): BNP in the last 72 hours. PT/INR: No results for input(s): PROTIME, INR in the last 72 hours. APTT:No results for input(s): APTT in the last 72 hours. CARDIAC ENZYMES:No results for input(s): CKTOTAL, CKMB, CKMBINDEX, TROPONINI in the last 72 hours. FASTING LIPID PANEL:No results found for: HDL, LDLDIRECT, LDLCALC, TRIG  LIVER PROFILE:No results for input(s): AST, ALT, LABALBU in the last 72 hours. IMPRESSION:    1. Atypical chest pain    RECOMMENDATIONS:  1. Stress test      Discussed with patient and Nurse.     Electronically signed by Salomon Croft MD on 6/30/2021 at 10:30 AM    [facility]      [phone]

## 2021-06-30 NOTE — PLAN OF CARE
Problem: Falls - Risk of:  Goal: Will remain free from falls  Description: Will remain free from falls  6/30/2021 0504 by Hernán Winters RN  Outcome: Ongoing  6/30/2021 0143 by Yomaira Turcios RN  Outcome: Ongoing  Goal: Absence of physical injury  Description: Absence of physical injury  6/30/2021 0504 by Hernán Winters RN  Outcome: Ongoing  6/30/2021 0143 by Yomaira Turcios RN  Outcome: Ongoing     Problem: Pain:  Goal: Pain level will decrease  Description: Pain level will decrease  Outcome: Ongoing  Goal: Control of acute pain  Description: Control of acute pain  Outcome: Ongoing  Goal: Control of chronic pain  Description: Control of chronic pain  Outcome: Ongoing

## 2021-06-30 NOTE — H&P
[address] [facility]  CDU / OBSERVATION eNCOUnter  Resident Note     Pt Name: Demaris Runner  MRN: [id number]  [place] 1965  Date of evaluation: 6/30/21  Patient's PCP is :  Cheryl Graves MD    [address] [email or web address]       Chief Complaint   Patient presents with    Chest Pain         HISTORY OF PRESENT ILLNESS    Demaris Runner is a 63 y.o. female with past medical history of smoking presented presented emergency department with complaints of midsternal chest pain that started yesterday morning. Patient characterizes the pain as a sensation of heaviness. She does admit to some associated shortness of breath. Patient states she did have a previous cardiac catheterization however no stent placement. No associated diaphoresis, nausea, vomiting. Patient found to have negative troponins in the emergency department however new T wave inversions seen on EKG so patient was admitted to observation unit for cardiology evaluation. Patient seen and examined this morning, she currently denies chest pain, no acute events overnight.     Location/Symptom: Midsternal chest pain  Timing/Onset: Acute  Provocation: None  Quality: Heaviness  Radiation: None  Severity: 6/10  Timing/Duration: Constant with intermittent worsening  Modifying Factors: None    REVIEW OF SYSTEMS       General ROS - No fevers, No malaise   Ophthalmic ROS - No discharge, No changes in vision  ENT ROS -  No sore throat, No rhinorrhea,   Respiratory ROS -positive for shortness of breath, no cough, no  wheezing  Cardiovascular ROS -positive for chest pain no dyspnea on exertion  Gastrointestinal ROS - No abdominal pain, no nausea or vomiting, no change in bowel habits, no black or bloody stools  Genito-Urinary ROS - No dysuria, trouble voiding, or hematuria  Musculoskeletal ROS - No myalgias, No arthalgias  Neurological ROS - No headache, no dizziness/lightheadedness, No focal weakness, no loss of sensation  Dermatological ROS - No lesions, No rash     (PQRS) Advance directives on face sheet per hospital policy. No change unless specifically mentioned in chart    PAST MEDICAL HISTORY    has a past medical history of Arthritis, Gastroesophageal reflux disease without esophagitis, H/O vaginal hysterectomy, Tobacco abuse, and Vision abnormalities. I have reviewed the past medical history with the patient and it is pertinent to this complaint. SURGICAL HISTORY      has a past surgical history that includes Tonsillectomy;  section; Hysterectomy; hernia repair (Right); Breast surgery (Left); and Colonoscopy (8/27/15). I have reviewed and agree with Surgical History entered and it is pertinent to this complaint. CURRENT MEDICATIONS     sodium chloride flush 0.9 % injection 5-40 mL, 2 times per day  sodium chloride flush 0.9 % injection 5-40 mL, PRN  0.9 % sodium chloride infusion, PRN  enoxaparin (LOVENOX) injection 40 mg, Daily  acetaminophen (TYLENOL) tablet 650 mg, Q4H PRN  ondansetron (ZOFRAN-ODT) disintegrating tablet 4 mg, Q8H PRN   Or  ondansetron (ZOFRAN) injection 4 mg, Q6H PRN        All medication charted and reviewed. ALLERGIES     has No Known Allergies. FAMILY HISTORY     She indicated that her mother is alive. She indicated that the status of her father is unknown. She indicated that both of her sisters are alive. She indicated that all of her four brothers are alive. She indicated that her maternal grandmother is . She indicated that her maternal grandfather is . family history includes High Cholesterol in her brother; Hypertension in her brother; Stroke in her mother. The patient denies any pertinent family history. I have reviewed and agree with the family history entered. I have reviewed the Family History and it is not significant to the case    SOCIAL HISTORY      reports that she has been smoking cigarettes. She has been smoking about 1.00 pack per day.  She has never used smokeless tobacco. She reports current alcohol use. She reports that she does not use drugs. I have reviewed and agree with all Social.  There are no concerns for substance abuse/use. PHYSICAL EXAM     INITIAL VITALS:  height is 5' 5\" (1.651 m) and weight is 215 lb (97.5 kg). Her oral temperature is 98.7 °F (37.1 °C). Her blood pressure is 137/75 and her pulse is 75. Her respiration is 18 and oxygen saturation is 96%.       CONSTITUTIONAL: AOx4, no apparent distress, appears stated age    HEAD: normocephalic, atraumatic   EYES: PERRLA, EOMI    ENT: moist mucous membranes, uvula midline   NECK: supple, symmetric   BACK: symmetric   LUNGS: clear to auscultation bilaterally   CARDIOVASCULAR: regular rate and rhythm, no murmurs, rubs or gallops   ABDOMEN: soft, non-tender, non-distended with normal active bowel sounds   NEUROLOGIC:  MAEx4, no focal sensory or motor deficits   MUSCULOSKELETAL: no clubbing, cyanosis or edema   SKIN: no rash or wounds       DIFFERENTIAL DIAGNOSIS/MDM:   ACS, stable angina, unstable angina, coronary vasospasm causing musculoskeletal chest pain    DIAGNOSTIC RESULTS     EKG: All EKG's are interpreted by the Observation Physician who either signs or Co-signs this chart in the absence of a cardiologist.    EKG Interpretation    Interpreted by observation physician    Rhythm: normal sinus   Rate: normal  Axis: normal  Ectopy: none  Conduction: normal  ST Segments: no acute change  T Waves: T wave inversion V2  Q Waves: none    Clinical Impression: Normal sinus rhythm with T wave inversion seen in V2 not present on prior EKG in 2017    Karolina Magnolia, DO        RADIOLOGY:   I directly visualized the following  images and reviewed the radiologist interpretations:    XR CHEST (2 VW)    Result Date: 6/29/2021  EXAMINATION: TWO XRAY VIEWS OF THE CHEST 6/29/2021 6:03 pm COMPARISON: 05/14/2017 HISTORY: ORDERING SYSTEM PROVIDED HISTORY: chest pain TECHNOLOGIST PROVIDED HISTORY: chest pain FINDINGS: There is suboptimal inspiration. The cardiac size is normal. No acute infiltrates or pleural effusions are seen. Pulmonary vascularity appears normal. There is mild ectasia of the thoracic aorta. 6no acute bony abnormalities. The hilar structures are normal.     No acute cardiopulmonary disease       LABS:  I have reviewed and interpreted all available lab results. Labs Reviewed   CBC WITH AUTO DIFFERENTIAL - Abnormal; Notable for the following components:       Result Value    WBC 13.0 (*)     Absolute Lymph # 4.94 (*)     All other components within normal limits   BASIC METABOLIC PANEL W/ REFLEX TO MG FOR LOW K - Abnormal; Notable for the following components:    Glucose 170 (*)     All other components within normal limits   COVID-19, RAPID   TROPONIN   TROPONIN   BRAIN NATRIURETIC PEPTIDE   LIPASE   TROPONIN       SCREENING TOOLS:    HEART Risk Score for Chest Pain Patients   History and Physical Exam Suspicion Level  (Nausea, Vomiting, Diaphoresis, Radiation, Exertion)   Slightly Suspicious (0 pts)   Moderately Suspicious (1 pt)   Highly Suspicious (2 pts)   EKG Interpretation   Normal (0 pts)   Non-Specific Repolarization Disturbance (1 pt)   Significant ST-Depression (2 pts)   Age of Patient (in years)   = 39 (0 pts)   46-64 (1 pt)   = 65 (2 pts)   Risk Factors   No Risk Factors (0 pts)   1-2 Risk Factors (1 pt)   = 3 Risk Factors (2 pts)   Risk Factors Include:   Hypercholesterolemia   Hypertension   Diabetes Mellitus   Cigarette smoking   Positive family history   Obesity   CAD   (SLE, CKDz, HIV, Cocaine abuse)   Troponin Levels   = Normal Limit (0 pts)   1-3 Times Normal Limit (1 pt)   > 3 Times Normal Limit (2 pts)  TOTAL:    Percent Risk for Major Adverse Cardiac Event (MACE)  0-3 pts indicates low risk for MACE   2.5% (DISCHARGE)   4-7 pts indicates moderate risk for MACE  20.3% (OBS)  8-10 pts indicates high risk for MACE  72.7% (EARLY INVASIVE TX)    CDU IMPRESSION / Diane Bound is a 63 y.o. female who presents with acute onset of midsternal nonradiating chest pain characterized as sensation of heaviness with associated shortness of breath. Patient states she had a previous cardiac catheterization with no stent placement. Patient found to have negative troponins emergency department however EKG was remarkable for T wave inversion in V2. Patient was admitted to observation unit for cardiology evaluation    · Cardiology consulted  · Stress test as order, further planning after stress results  · Continue home medications and pain control  · Monitor vitals, labs, and imaging  · DISPO: pending consults and clinical improvement    CONSULTS:    IP CONSULT TO CARDIOLOGY    PROCEDURES:  Not indicated       PATIENT REFERRED TO:    No follow-up provider specified. --  Yaz Jorge, DO   Emergency Medicine Resident     This dictation was generated by voice recognition computer software. Although all attempts are made to edit the dictation for accuracy, there may be errors in the transcription that are not intended.

## 2021-06-30 NOTE — ED NOTES
Report called to [facility]. 3C nurse states she will look the pt up. Pt A&Ox4. rr even and unlabored.      Nikhil Spence RN  06/30/21 5847

## 2021-06-30 NOTE — ED PROVIDER NOTES
[facility] ED  Emergency Department Encounter  EmergencyMedicine Resident     Pt Name:Amy Ceballos  MRN: [id number]  [place] 1965  Date of evaluation: 21  PCP:  Juvenal Pratt MD    [address] [place] [address]       Chief Complaint   Patient presents with    Chest Pain       HISTORY OF PRESENT ILLNESS  (Location/Symptom, Timing/Onset, Context/Setting, Quality, Duration, Modifying Factors, Severity.)      Marilu Duarte is a 63 y.o. female who presents with midsternal chest pain, chest heaviness ongoing since earlier this morning. Patient with some associated shortness of breath. Patient states that she has had previous cardiac catheterization but no stent placement. States that she currently smokes but has no known history of hypertension or diabetes. Reports some family history of cardiac disease in her aunt. States that she does not notice any specific aggravating or alleviating factors of pain. Reporting some associated epigastric abdominal pain as well. PAST MEDICAL / SURGICAL / SOCIAL / FAMILY HISTORY      has a past medical history of Arthritis, Gastroesophageal reflux disease without esophagitis, H/O vaginal hysterectomy, Tobacco abuse, and Vision abnormalities. has a past surgical history that includes Tonsillectomy;  section; Hysterectomy; hernia repair (Right); Breast surgery (Left); and Colonoscopy (8/27/15). Social History     Socioeconomic History    Marital status:      Spouse name: Not on file    Number of children: Not on file    Years of education: Not on file    Highest education level: Not on file   Occupational History    Not on file   Tobacco Use    Smoking status: Current Some Day Smoker     Packs/day: 1.00     Types: Cigarettes    Smokeless tobacco: Never Used   Substance and Sexual Activity    Alcohol use:  Yes     Alcohol/week: 0.0 standard drinks     Comment: OCC    Drug use: No    Sexual activity: Yes     Partners: Male Birth control/protection: Surgical     Comment: hyst   Other Topics Concern    Not on file   Social History Narrative    Not on file     Social Determinants of Health     Financial Resource Strain:     Difficulty of Paying Living Expenses:    Food Insecurity:     Worried About Running Out of Food in the Last Year:     [address] in the Last Year:    Transportation Needs:     Lack of Transportation (Medical):  Lack of Transportation (Non-Medical):    Physical Activity:     Days of Exercise per Week:     Minutes of Exercise per Session:    Stress:     Feeling of Stress :    Social Connections:     Frequency of Communication with Friends and Family:     Frequency of Social Gatherings with Friends and Family:     Attends Druze Services:     Active Member of Clubs or Organizations:     Attends Club or Organization Meetings:     Marital Status:    Intimate Partner Violence:     Fear of Current or Ex-Partner:     Emotionally Abused:     Physically Abused:     Sexually Abused:        Family History   Problem Relation Age of Onset    Stroke Mother         3x    Hypertension Brother     High Cholesterol Brother        Allergies:  Patient has no known allergies. Home Medications:  Prior to Admission medications    Medication Sig Start Date End Date Taking? Authorizing Provider   conjugated estrogens (PREMARIN) 0.625 MG/GM vaginal cream Small quarter size intravaginal twice daily x 2 weeks then at bedtime x 2 weeks then 2x/week as needed 3/5/19   Clarence Heard,    ibuprofen (ADVIL;MOTRIN) 600 MG tablet Take 1 tablet by mouth every 6 hours as needed for Pain 8/5/18   Rhiannon Alvarado MD       REVIEW OF SYSTEMS    (2-9 systems for level 4, 10 or more for level 5)      Review of Systems   Constitutional: Positive for fatigue. Respiratory: Positive for chest tightness and shortness of breath. Cardiovascular: Positive for chest pain.    Gastrointestinal: Negative for abdominal pain, nausea and vomiting. Endocrine: Negative for polydipsia and polyuria. Genitourinary: Negative for dysuria and flank pain. Musculoskeletal: Negative for neck pain and neck stiffness. Skin: Negative for rash and wound. Neurological: Negative for light-headedness and headaches. Psychiatric/Behavioral: Negative for confusion. PHYSICAL EXAM   (up to 7 for level 4, 8 or more for level 5)      INITIAL VITALS:   BP (!) 126/116   Pulse 82   Temp 97.9 °F (36.6 °C) (Oral)   Resp 18   Ht 5' 5\" (1.651 m)   Wt 215 lb (97.5 kg)   SpO2 95%   BMI 35.78 kg/m²     Physical Exam  Constitutional:       General: She is not in acute distress. Appearance: She is not toxic-appearing. HENT:      Head: Normocephalic and atraumatic. Cardiovascular:      Rate and Rhythm: Normal rate. Heart sounds: No murmur heard. No friction rub. No gallop. Pulmonary:      Effort: No respiratory distress. Breath sounds: No wheezing, rhonchi or rales. Abdominal:      General: There is no distension. Tenderness: There is no abdominal tenderness. There is no rebound. Skin:     Findings: No erythema, lesion or rash. Neurological:      Motor: No weakness.       Gait: Gait normal.         DIFFERENTIAL  DIAGNOSIS     PLAN (LABS / IMAGING / EKG):  Orders Placed This Encounter   Procedures    COVID-19, Rapid    XR CHEST (2 VW)    CBC Auto Differential    Troponin    Basic Metabolic Panel w/ Reflex to MG    Brain Natriuretic Peptide    Lipase    EKG 12 Lead    PATIENT STATUS (FROM ED OR OR/PROCEDURAL) Observation       MEDICATIONS ORDERED:  Orders Placed This Encounter   Medications    aspirin chewable tablet 324 mg       DDX: Gastritis, STEMI, NSTEMI, unstable angina, bronchitis, pneumonia    DIAGNOSTIC RESULTS / EMERGENCY DEPARTMENT COURSE / MDM   LAB RESULTS:  Results for orders placed or performed during the hospital encounter of 06/29/21   COVID-19, Rapid    Specimen: Nasopharyngeal Swab   Result 35 <300 pg/mL    BNP Interpretation Pro-BNP Reference Range:    Lipase   Result Value Ref Range    Lipase 35 13 - 60 U/L       IMPRESSION: 48-year-old female no acute distress presenting with midsternal chest pain and pressure with some mild associated shortness of breath. Patient with multiple cardiac risk factors including hypertension, obesity, cigarette smoking. Patient with no recent cardiac work-up, does not currently follow with cardiology. Patient reports that she had a catheterization several years ago but no stent placement. Treated in the emergency department with 0.4 mg aspirin, EKG nonspecific. Initial troponin of 11. Chest x-ray unremarkable. Heart score 6. Plan for admission to observation unit for cardiology consultation    RADIOLOGY:  XR CHEST (2 VW)    Result Date: 6/29/2021  EXAMINATION: TWO XRAY VIEWS OF THE CHEST 6/29/2021 6:03 pm COMPARISON: 05/14/2017 HISTORY: ORDERING SYSTEM PROVIDED HISTORY: chest pain TECHNOLOGIST PROVIDED HISTORY: chest pain FINDINGS: There is suboptimal inspiration. The cardiac size is normal. No acute infiltrates or pleural effusions are seen. Pulmonary vascularity appears normal. There is mild ectasia of the thoracic aorta. 6no acute bony abnormalities. The hilar structures are normal.     No acute cardiopulmonary disease       EKG  EKG Interpretation    Interpreted by me    Rhythm: normal sinus   Rate: normal  Axis: normal  Ectopy: none  Conduction: , QRS 72, QTc 438, low voltage  ST Segments: no acute change  T Waves: Inversion V2, flattening V3  Q Waves: none    Clinical Impression: Nonspecific EKG    All EKG's are interpreted by the Emergency Department Physician who either signs or Co-signs this chart in the absence of a cardiologist.  No acute changes compared to prior from 5/14/2017      PROCEDURES:  None    CONSULTS:  IP CONSULT TO CARDIOLOGY    CRITICAL CARE:  Please see attending note    FINAL IMPRESSION      1.  Chest pain, unspecified type DISPOSITION / PLAN     DISPOSITION Admitted 06/29/2021 11:50:16 PM      PATIENT REFERRED TO:  No follow-up provider specified.     DISCHARGE MEDICATIONS:  New Prescriptions    No medications on file       Claudetta Hough, DO  Emergency Medicine Resident    (Please note that portions of thisnote were completed with a voice recognition program.  Efforts were made to edit the dictations but occasionally words are mis-transcribed.)       Claudetta Hough, DO  Resident  06/30/21 [id number]

## 2021-06-30 NOTE — CARE COORDINATION
Case Management Initial Discharge Plan  Lucas Bryant,             Met with:patient to discuss discharge plans. Information verified: address, contacts, phone number, , insurance Yes  Insurance Provider: Patient thinks that Courtney Maria De Jesus will be instated on 21    Emergency Contact/Next of Kin name & number: Raina Valdovinos, spouse [phone]  Who are involved in patient's support system?      PCP: Jacinto Brennan MD        Discharge Planning    Living Arrangements:  Spouse/Significant Other     Home has 1 stories  5 stairs to climb to get into front door, na stairs to climb to reach second floor  Location of bedroom/bathroom in home main level     Patient able to perform ADL's:Independent    Current Services (outpatient & in home) none   DME equipment: cane   DME provider: na     Is patient receiving oral anticoagulation therapy? No    If indicated:   Physician managing anticoagulation treatment: na   Where does patient obtain lab work for ATC treatment? na      Potential Assistance Needed:  N/A    Patient agreeable to home care: No  [place] of choice provided:  n/a    Prior SNF/Rehab Placement and Facility: none   Agreeable to SNF/Rehab: No  [place] of choice provided: n/a     Evaluation: no    Expected Discharge date:  21    Patient expects to be discharged to:  home    If home: is the family and/or caregiver wiling & able to provide support at home? Yes   Who will be providing this support? Spouse *    Follow Up Appointment: Best Day/ Time: Monday AM    Transportation provider: spouse   Transportation arrangements needed for discharge: No    Readmission Risk              Risk of Unplanned Readmission:  0             Does patient have a readmission risk score greater than 14?: No  If yes, follow-up appointment must be made within 7 days of discharge.      Goals of Care: to get better       Educated patient and spouse  on transitional options, provided freedom of choice and are agreeable with plan      Discharge Plan: Home independently           Electronically signed by Karyn Hitchcock RN on 6/30/21 at [address] PM EDT

## 2021-06-30 NOTE — ED NOTES
Bed: 33  Expected date:   Expected time:   Means of arrival:   Comments:     Fryolan Vincent RN  06/29/21 2045

## 2021-06-30 NOTE — ED NOTES
Pt arrives to ED via self. Pt complains of CP that started after eating dinner. Pt denies any radiation. Pt states she had a cardiac cath back in 2013 that was normal. Pt denies radiation. Pt placed on full monitor. VSS.      Nata Kahn RN  06/30/21 [id number]

## 2021-07-01 ENCOUNTER — APPOINTMENT (OUTPATIENT)
Dept: CARDIAC CATH/INVASIVE PROCEDURES | Age: 56
DRG: 247 | End: 2021-07-01

## 2021-07-01 PROCEDURE — 2580000003 HC RX 258: Performed by: INTERNAL MEDICINE

## 2021-07-01 PROCEDURE — 4A023N8 MEASUREMENT OF CARDIAC SAMPLING AND PRESSURE, BILATERAL, PERCUTANEOUS APPROACH: ICD-10-PCS | Performed by: INTERNAL MEDICINE

## 2021-07-01 PROCEDURE — 7100000010 HC PHASE II RECOVERY - FIRST 15 MIN

## 2021-07-01 PROCEDURE — 2500000003 HC RX 250 WO HCPCS

## 2021-07-01 PROCEDURE — C1887 CATHETER, GUIDING: HCPCS

## 2021-07-01 PROCEDURE — B2151ZZ FLUOROSCOPY OF LEFT HEART USING LOW OSMOLAR CONTRAST: ICD-10-PCS | Performed by: INTERNAL MEDICINE

## 2021-07-01 PROCEDURE — 6360000002 HC RX W HCPCS

## 2021-07-01 PROCEDURE — 93458 L HRT ARTERY/VENTRICLE ANGIO: CPT | Performed by: INTERNAL MEDICINE

## 2021-07-01 PROCEDURE — C1874 STENT, COATED/COV W/DEL SYS: HCPCS

## 2021-07-01 PROCEDURE — B2111ZZ FLUOROSCOPY OF MULTIPLE CORONARY ARTERIES USING LOW OSMOLAR CONTRAST: ICD-10-PCS | Performed by: INTERNAL MEDICINE

## 2021-07-01 PROCEDURE — 2580000003 HC RX 258: Performed by: STUDENT IN AN ORGANIZED HEALTH CARE EDUCATION/TRAINING PROGRAM

## 2021-07-01 PROCEDURE — 1200000000 HC SEMI PRIVATE

## 2021-07-01 PROCEDURE — 7100000011 HC PHASE II RECOVERY - ADDTL 15 MIN

## 2021-07-01 PROCEDURE — C1769 GUIDE WIRE: HCPCS

## 2021-07-01 PROCEDURE — 027034Z DILATION OF CORONARY ARTERY, ONE ARTERY WITH DRUG-ELUTING INTRALUMINAL DEVICE, PERCUTANEOUS APPROACH: ICD-10-PCS | Performed by: INTERNAL MEDICINE

## 2021-07-01 PROCEDURE — C1894 INTRO/SHEATH, NON-LASER: HCPCS

## 2021-07-01 PROCEDURE — 92928 PRQ TCAT PLMT NTRAC ST 1 LES: CPT | Performed by: INTERNAL MEDICINE

## 2021-07-01 PROCEDURE — 2709999900 HC NON-CHARGEABLE SUPPLY

## 2021-07-01 PROCEDURE — 6370000000 HC RX 637 (ALT 250 FOR IP): Performed by: STUDENT IN AN ORGANIZED HEALTH CARE EDUCATION/TRAINING PROGRAM

## 2021-07-01 PROCEDURE — 6360000004 HC RX CONTRAST MEDICATION

## 2021-07-01 PROCEDURE — C1725 CATH, TRANSLUMIN NON-LASER: HCPCS

## 2021-07-01 PROCEDURE — 6370000000 HC RX 637 (ALT 250 FOR IP)

## 2021-07-01 RX ORDER — SODIUM CHLORIDE 0.9 % (FLUSH) 0.9 %
5-40 SYRINGE (ML) INJECTION EVERY 12 HOURS SCHEDULED
Status: DISCONTINUED | OUTPATIENT
Start: 2021-07-01 | End: 2021-07-02 | Stop reason: HOSPADM

## 2021-07-01 RX ORDER — SODIUM CHLORIDE 9 MG/ML
25 INJECTION, SOLUTION INTRAVENOUS PRN
Status: DISCONTINUED | OUTPATIENT
Start: 2021-07-01 | End: 2021-07-02 | Stop reason: HOSPADM

## 2021-07-01 RX ORDER — SODIUM CHLORIDE 0.9 % (FLUSH) 0.9 %
5-40 SYRINGE (ML) INJECTION PRN
Status: DISCONTINUED | OUTPATIENT
Start: 2021-07-01 | End: 2021-07-02 | Stop reason: HOSPADM

## 2021-07-01 RX ORDER — ASPIRIN 81 MG/1
81 TABLET ORAL DAILY
Status: DISCONTINUED | OUTPATIENT
Start: 2021-07-02 | End: 2021-07-02 | Stop reason: HOSPADM

## 2021-07-01 RX ORDER — ATORVASTATIN CALCIUM 40 MG/1
40 TABLET, FILM COATED ORAL NIGHTLY
Status: DISCONTINUED | OUTPATIENT
Start: 2021-07-01 | End: 2021-07-02 | Stop reason: HOSPADM

## 2021-07-01 RX ORDER — SODIUM CHLORIDE 9 MG/ML
INJECTION, SOLUTION INTRAVENOUS CONTINUOUS
Status: DISCONTINUED | OUTPATIENT
Start: 2021-07-01 | End: 2021-07-02 | Stop reason: HOSPADM

## 2021-07-01 RX ORDER — ACETAMINOPHEN 325 MG/1
650 TABLET ORAL EVERY 4 HOURS PRN
Status: DISCONTINUED | OUTPATIENT
Start: 2021-07-01 | End: 2021-07-02 | Stop reason: HOSPADM

## 2021-07-01 RX ADMIN — TICAGRELOR 90 MG: 90 TABLET ORAL at 23:40

## 2021-07-01 RX ADMIN — DESMOPRESSIN ACETATE 40 MG: 0.2 TABLET ORAL at 23:39

## 2021-07-01 RX ADMIN — SODIUM CHLORIDE, PRESERVATIVE FREE 10 ML: 5 INJECTION INTRAVENOUS at 23:40

## 2021-07-01 RX ADMIN — SODIUM CHLORIDE: 9 INJECTION, SOLUTION INTRAVENOUS at 12:53

## 2021-07-01 RX ADMIN — METOPROLOL TARTRATE 12.5 MG: 25 TABLET ORAL at 18:09

## 2021-07-01 ASSESSMENT — PAIN SCALES - GENERAL: PAINLEVEL_OUTOF10: 3

## 2021-07-01 NOTE — OP NOTE
[facility]    CARDIAC CATHETERIZATION    Date:   7/1/2021  Patient name:  Howie Epstein  Date of admission:  6/29/2021  8:45 PM  MRN:   [id number]  YOB: 1965    Operators:  Primary:   Rissa Mercedes MD (Attending Physician)    Assistant/CV fellow: Tamara Houston MD      Procedure performed:     [x] Left Heart Catheterization. [] Graft Angiography. [x] Left Ventriculography. [] Right Heart Catheterization. [x] Coronary Angiography. [] Aortic Valve Studies. [x] PCI:  RCA    [] Other:       Indication:  [] STEMI      [x] + Stress test  [] ACS      [] + EKG Changes  [] Non Q MI       [] Significant Risk Factors  [] Recurrent Angina             [] Diabetes Mellitus    [] New LBBB      [] Other.  [] CHF / Low EF changes     [] Abnormal CTA / Ca Score      Procedure:  Access:  [] Femoral  [x] Radial  artery       [x] Right  [] Left    Procedure: After informed consent was obtained with explanation of the risks and benefits, patient was brought to the cath lab. The access area was prepped and draped in sterile fashion. 1% lidocaine was used for local block. The artery was cannulated with 6  Fr sheath with brisk arterial blood return. The side port was frequently flushed and aspirated with normal saline. Estimated Blood Loss:  [x] Minimal < 25 cc [] Moderate 25-50 cc  [] >50 cc    Findings:  LMCA: Normal 0% stenosis. LAD: Mild irregularities 20-30%. LCx: Diffuse irregularities 30-40%. RCA: Single stenosis.       Lesion on Prox RCA: 90% stenosis 14 mm length reduced to 0%. Pre     procedure GARTH III flow was noted. Post Procedure GARTH III flow was     present. Good runoff was present. The lesion was diagnosed as Moderate     Risk (B).       Coronary Tree  Dominance: Right     The LV gram was performed in the CHACON 30 position. LVEF: 55%.        Conclusions:   Successful PCI / Drug Eluting Stent of the proximal RCA.    Minimal LAD and LCX disease.    Normal LV systolic function.        Recommendations        Routine Post Stent Orders.    Medical therapy as needed.    Risk factor modification. ____________________________________________________________________    History and Risk Factors    [] Hypertension     [] Family history of CAD  [] Hyperlipidemia     [] Cerebrovascular Disease   [] Prior MI       [] Peripheral Vascular disease   [] Prior PCI              [] Diabetes Mellitus    [] Left Main PCI. [] Currently on Dialysis. [] Prior CABG. [x] Currently smoker. [] Cardiac Arrest outside of healthcare facility. [] Yes    [x] No        Witnessed     [] Yes   [] No     Arrest after arrival of EMS  [] Yes   [] No     [] Cardiac Arrest at other Facility. [] Yes   [x] No    Pre-Procedure Information. Heart Failure       [] Yes    [x] No        Class  [] I      [] II  [] III    [] IV. New Diagnosis    [] Yes  [] No    HF Type      [] Systolic   [] Diastolic          [] Unknown. Diagnostic Test:   EKG       [] Normal   [x] Abnormal    New antiarrhythmia medications:    [] Yes   [] No   New onset atrial fibrillation / Flutter     [] Yes   [] No   ECG Abnormalities:      [] V. Fib   [] Annika V. Tach           [] NS V. T   [] New LBBB           [] T. Inv  []  ST dev > 0.5 mm         [] PVC's freq  [] PVC's infrequent    Stress Test Performed:      [x] Yes    [] No     Type:     [] Stress Echo   [] Exercise Stress Test (no imaging)      [x] Stress Nuclear  [] Stress Imaging     Results   [] Negative   [x] Positive        [] Indeterminate  [] Unavailable     If Positive/ Risk / Extent of Ischemia:       [] Low  [x] Intermediate         [] High  [] Unavailable      Cardiac CTA Performed:     [] Yes    [x] No      Results   [] CAD   [] Non obstructive CAD      [] No CAD   [] Uncertain      [] Unknown   [] Structural Disease.      Pre Procedure Medications:   [] Yes    [x] No         [] ASA   [] Beta Blockers      [] Nitrate   [] Ca Channel Blockers      []

## 2021-07-01 NOTE — PLAN OF CARE
Problem: Falls - Risk of:  Goal: Will remain free from falls  Description: Will remain free from falls  Outcome: Ongoing  Goal: Absence of physical injury  Description: Absence of physical injury  Outcome: Ongoing     Problem: Pain:  Goal: Pain level will decrease  Description: Pain level will decrease  Outcome: Ongoing  Goal: Control of acute pain  Description: Control of acute pain  Outcome: Ongoing  Goal: Control of chronic pain  Description: Control of chronic pain  Outcome: Ongoing     Problem: Cardiac:  Goal: Ability to maintain vital signs within normal range will improve  Description: Ability to maintain vital signs within normal range will improve  Outcome: Ongoing  Goal: Cardiovascular alteration will improve  Description: Cardiovascular alteration will improve  Outcome: Ongoing     Problem: Health Behavior:  Goal: Will modify at least one risk factor affecting health status  Description: Will modify at least one risk factor affecting health status  Outcome: Ongoing  Goal: Identification of resources available to assist in meeting health care needs will improve  Description: Identification of resources available to assist in meeting health care needs will improve  Outcome: Ongoing     Problem: Falls - Risk of:  Goal: Will remain free from falls  Description: Will remain free from falls  Outcome: Ongoing  Goal: Absence of physical injury  Description: Absence of physical injury  Outcome: Ongoing     Problem: Pain:  Goal: Pain level will decrease  Description: Pain level will decrease  Outcome: Ongoing  Goal: Control of acute pain  Description: Control of acute pain  Outcome: Ongoing  Goal: Control of chronic pain  Description: Control of chronic pain  Outcome: Ongoing     Problem: Cardiac:  Goal: Ability to maintain vital signs within normal range will improve  Description: Ability to maintain vital signs within normal range will improve  Outcome: Ongoing  Goal: Cardiovascular alteration will improve  Description: Cardiovascular alteration will improve  Outcome: Ongoing     Problem: Health Behavior:  Goal: Will modify at least one risk factor affecting health status  Description: Will modify at least one risk factor affecting health status  Outcome: Ongoing  Goal: Identification of resources available to assist in meeting health care needs will improve  Description: Identification of resources available to assist in meeting health care needs will improve  Outcome: Ongoing   Electronically signed by Fredis Waters RN on 7/1/2021 at 4:34 PM

## 2021-07-01 NOTE — PROGRESS NOTES
[facility]  CDU / OBSERVATION eNCOUnter  Attending NOte       I performed a history and physical examination of the patient and discussed management with the resident. I reviewed the residents note and agree with the documented findings and plan of care. Any areas of disagreement are noted on the chart. I was personally present for the key portions of any procedures. I have documented in the chart those procedures where I was not present during the key portions. I have reviewed the nurses notes. I agree with the chief complaint, past medical history, past surgical history, allergies, medications, social and family history as documented unless otherwise noted below. The Family history, social history, and ROS are effectively unchanged since admission unless noted elsewhere in the chart. Patient to cath today. Follow up this result. Discharge planning.      Shilpi Holland MD  Attending Emergency  Physician

## 2021-07-01 NOTE — PROGRESS NOTES
[facility]  Progress Note                   Date:   7/1/2021  Patient name: Soha Paulino  Date of admission:  6/29/2021  8:45 PM  MRN:   [id number]  YOB: 1965  PCP: Tim Flores MD    Reason for Admission: Chest pain [R07.9]    Subjective:       Clinical Changes /Abnormalities: Seen & examined in room. Denies any chest pain presently but states had an \"episode\" last night of some pain but it has since resolved. Has been NPO for cath. States the medicine team \"discussed it with me. \" Labs, vitals, & tele reveiwed. Review of Systems    Medications:   Scheduled Meds:   sodium chloride flush  5-40 mL Intravenous 2 times per day    enoxaparin  40 mg Subcutaneous Daily     Continuous Infusions:   sodium chloride       CBC:   Recent Labs     06/29/21 2101   WBC 13.0*   HGB 13.7        BMP:    Recent Labs     06/29/21 2101      K 3.9      CO2 25   BUN 14   CREATININE 0.82   GLUCOSE 170*     Hepatic:No results for input(s): AST, ALT, ALB, BILITOT, ALKPHOS in the last 72 hours. Troponin:   Recent Labs     06/29/21 2101 06/29/21  2306   TROPHS 11 9     BNP: No results for input(s): BNP in the last 72 hours. Lipids: No results for input(s): CHOL, HDL in the last 72 hours. Invalid input(s): LDLCALCU  INR: No results for input(s): INR in the last 72 hours. Objective:   Vitals: /75   Pulse 71   Temp 98.3 °F (36.8 °C) (Oral)   Resp 14   Ht 5' 5\" (1.651 m)   Wt 214 lb 15.2 oz (97.5 kg)   SpO2 95%   BMI 35.77 kg/m²   General appearance: alert and cooperative with exam  HEENT: Head: Normocephalic, no lesions, without obvious abnormality. Neck:no JVD, trachea midline, no adenopathy  Lungs: Clear to auscultation  Heart: Regular rate and rhythm, s1/s2 auscultated, no murmurs  Abdomen: soft, non-tender, bowel sounds active  Extremities: no edema  Neurologic: not done    Stress Test 6/30/21  Impression   1.  Equivocal reversible ischemia at the basal inferior wall.  No fixed   perfusion defect to suggest infarct. 2. Left ventricular ejection fraction of 59%. 3.  Please see report for EKG portion of the examination which will be   performed separately by physician from cardiology. Risk stratification:  Intermediate risk         Assessment / Acute Cardiac Problems:   1. Angina with stress test concerning for reversible ischemia  2. Morbid obesity  3. Tobacco abuse  4. GERD       Patient Active Problem List:     H/O vaginal hysterectomy-LSO     Tobacco abuse     Gastroesophageal reflux disease without esophagitis     Chest pain      Plan of Treatment:   1. NPO for cath today given stress test findings  2.  Further POC pending cath    Electronically signed by ZAK Ortiz CNP on 7/1/2021 at [address] [facility].  [phone]

## 2021-07-01 NOTE — FLOWSHEET NOTE
SPIRITUAL CARE PROGRESS NOTE     Spiritual Assessment: Pt was calmly lying in bed, with family members at bedside upon  arrival. Pt admits being somewhat anxious about upcoming procedure.  Intervention:  facilitated feelings and concerns.  nurtured hope.  prayed with pt.  Outcome: Pt expressed feelings and concerns. Pt was receptive to visit and prayer. Pt expressed gratitude for visit and prayer. 07/01/21 1156   Encounter Summary   Services provided to: Patient   Referral/Consult From: [address] [place] [address] Family members;Spouse   Continue Visiting   (7/1/21)   Complexity of Encounter Low   Length of Encounter 15 minutes   Spiritual Assessment Completed Yes   Routine   Type Initial   Assessment Calm; Approachable   Intervention Explored feelings, thoughts, concerns;Prayer   Outcome Expressed gratitude;Expressed feelings/needs/concerns;Receptive

## 2021-07-01 NOTE — PROGRESS NOTES
OBS/CDU   RESIDENT NOTE      Patients PCP is:  Megan Marrero MD        SUBJECTIVE      No acute events overnight. Patient continues to complain of midsternal chest pain 3/10. Cardiology completed stress test results showed intermediate risk and ordered patient be sent to Cath Lab. Patient is n.p.o. Patient denies any new symptoms. PHYSICAL EXAM      General: NAD, AO X 3  Heent: EMOI, PERRL  Neck: SUPPLE, NO JVD  Cardiovascular: RRR, S1S2  Pulmonary: CTAB, NO SOB  Abdomen: SOFT, NTTP, ND, +BS  Extremities: +2/4 PULSES DISTAL, NO SWELLING  Neuro / Psych: NO NUMBNESS OR TINGLING, MENTATION AT BASELINE    PERTINENT TEST /EXAMS      I have reviewed all available laboratory results. MEDICATIONS CURRENT   0.9 % sodium chloride infusion, Continuous  sodium chloride flush 0.9 % injection 5-40 mL, 2 times per day  sodium chloride flush 0.9 % injection 5-40 mL, PRN  0.9 % sodium chloride infusion, PRN  sodium chloride flush 0.9 % injection 5-40 mL, 2 times per day  sodium chloride flush 0.9 % injection 5-40 mL, PRN  0.9 % sodium chloride infusion, PRN  enoxaparin (LOVENOX) injection 40 mg, Daily  acetaminophen (TYLENOL) tablet 650 mg, Q4H PRN  ondansetron (ZOFRAN-ODT) disintegrating tablet 4 mg, Q8H PRN   Or  ondansetron (ZOFRAN) injection 4 mg, Q6H PRN        All medication charted and reviewed. CONSULTS      IP CONSULT TO CARDIOLOGY    ASSESSMENT/PLAN       Mayo Arroyo is a 63 y.o. female who presents with acute onset of midsternal nonradiating chest pain. Admitted to observation unit for cardiology evaluation.     · Chest pain  · Stress test complete shows intermediate risk  · Cardiac cath completed awaiting results  · Tylenol PRN for pain   · Continue home medications and pain control  · Monitor vitals, labs, and imaging  · DISPO: pending consults and clinical improvement    --  Shelley Krishnan MD  Emergency Medicine Resident Physician     This dictation was generated by voice recognition computer software. Although all attempts are made to edit the dictation for accuracy, there may be errors in the transcription that are not intended.

## 2021-07-01 NOTE — PROGRESS NOTES
[facility]  Pre-Procedure Conscious Sedation Data         Pre Procedure Conscious Sedation Data:  ASA Class:                  [] I [x] II [] III [] IV     Mallampati Class:       [] I [x] II [] III [] IV        Valeriano Leggett MD  Fellow Cardiovascular Disease  [facility]

## 2021-07-02 VITALS
OXYGEN SATURATION: 96 % | WEIGHT: 214.95 LBS | DIASTOLIC BLOOD PRESSURE: 80 MMHG | HEIGHT: 65 IN | TEMPERATURE: 97.8 F | RESPIRATION RATE: 16 BRPM | SYSTOLIC BLOOD PRESSURE: 134 MMHG | HEART RATE: 66 BPM | BODY MASS INDEX: 35.81 KG/M2

## 2021-07-02 PROCEDURE — 2580000003 HC RX 258: Performed by: STUDENT IN AN ORGANIZED HEALTH CARE EDUCATION/TRAINING PROGRAM

## 2021-07-02 PROCEDURE — 6370000000 HC RX 637 (ALT 250 FOR IP): Performed by: STUDENT IN AN ORGANIZED HEALTH CARE EDUCATION/TRAINING PROGRAM

## 2021-07-02 RX ORDER — ASPIRIN 81 MG/1
81 TABLET ORAL DAILY
Qty: 30 TABLET | Refills: 3 | Status: SHIPPED | OUTPATIENT
Start: 2021-07-03

## 2021-07-02 RX ORDER — ATORVASTATIN CALCIUM 40 MG/1
40 TABLET, FILM COATED ORAL NIGHTLY
Qty: 30 TABLET | Refills: 3 | Status: SHIPPED | OUTPATIENT
Start: 2021-07-02

## 2021-07-02 RX ADMIN — METOPROLOL TARTRATE 12.5 MG: 25 TABLET ORAL at 09:14

## 2021-07-02 RX ADMIN — TICAGRELOR 90 MG: 90 TABLET ORAL at 09:15

## 2021-07-02 RX ADMIN — SODIUM CHLORIDE, PRESERVATIVE FREE 10 ML: 5 INJECTION INTRAVENOUS at 09:16

## 2021-07-02 RX ADMIN — Medication 81 MG: at 09:15

## 2021-07-02 ASSESSMENT — PAIN SCALES - GENERAL: PAINLEVEL_OUTOF10: 0

## 2021-07-02 NOTE — DISCHARGE SUMMARY
CDU Discharge Summary        Patient:  Filiberto Harden  YOB: 1965    MRN: [id number]   Acct: [de-identified]    Primary Care Physician: Farida Olvera MD    Admit date:  6/29/2021  8:45 PM  Discharge date: 7/2/2021  2:30 PM      Discharge Diagnoses:     1.)  Acute onset substernal chest pain, admitted for cardiology evaluation and receiving cardiac catheterization. Patient received stenting and was started on medications for close outpatient follow-up. 2.  Multiple cardiac risk factors. Will manage risk with medication management and behavioral change. Follow-up:  Call today/tomorrow for a follow up appointment with Farida Olvera MD , or return to the Emergency Room with worsening symptoms    Stressed to patient the importance of following up with primary care doctor for further workup/management of symptoms. Pt verbalizes understanding and agrees with plan.     Discharge Medication Changes:       Medication List        START taking these medications      aspirin 81 MG EC tablet  Take 1 tablet by mouth daily  Start taking on: July 3, 2021     atorvastatin 40 MG tablet  Commonly known as: LIPITOR  Take 1 tablet by mouth nightly     metoprolol tartrate 25 MG tablet  Commonly known as: LOPRESSOR  Take 0.5 tablets by mouth 2 times daily     ticagrelor 90 MG Tabs tablet  Commonly known as: BRILINTA  Take 1 tablet by mouth 2 times daily            STOP taking these medications      conjugated estrogens 0.625 MG/GM vaginal cream  Commonly known as: Premarin     ibuprofen 600 MG tablet  Commonly known as: ADVIL;MOTRIN               Where to Get Your Medications        These medications were sent to [facility] [address] [facility], [address] [place] [address]  [address] [facility], [address]  [address]      Phone: [phone]   aspirin 81 MG EC tablet  atorvastatin 40 MG tablet  metoprolol tartrate 25 MG tablet  ticagrelor 90 MG Tabs tablet         Diet:  No diet orders on file, advance as tolerated     Activity:  As tolerated    Consultants: IP CONSULT TO CARDIOLOGY    Procedures: Cardiac catheterization    Diagnostic Test:   Results for orders placed or performed during the hospital encounter of 06/29/21   COVID-19, Rapid    Specimen: Nasopharyngeal Swab   Result Value Ref Range    Specimen Description . NASOPHARYNGEAL SWAB     SARS-CoV-2, Rapid Not Detected Not Detected   CBC Auto Differential   Result Value Ref Range    WBC 13.0 (H) 3.5 - 11.3 k/uL    RBC 4.94 3.95 - 5.11 m/uL    Hemoglobin 13.7 11.9 - 15.1 g/dL    Hematocrit 43.3 36.3 - 47.1 %    MCV 87.7 82.6 - 102.9 fL    MCH 27.7 25.2 - 33.5 pg    MCHC 31.6 28.4 - 34.8 g/dL    RDW 13.2 11.8 - 14.4 %    Platelets 586 107 - 182 k/uL    MPV 10.6 8.1 - 13.5 fL    NRBC Automated 0.0 0.0 per 100 WBC    Differential Type NOT REPORTED     WBC Morphology NOT REPORTED     RBC Morphology NOT REPORTED     Platelet Estimate NOT REPORTED     Immature Granulocytes 0 0 %    Seg Neutrophils 55 36 - 66 %    Lymphocytes 38 24 - 44 %    Monocytes 6 1 - 7 %    Eosinophils % 1 1 - 4 %    Basophils 0 0 - 2 %    Absolute Immature Granulocyte 0.00 0.00 - 0.30 k/uL    Segs Absolute 7.15 1.8 - 7.7 k/uL    Absolute Lymph # 4.94 (H) 1.0 - 4.8 k/uL    Absolute Mono # 0.78 0.1 - 0.8 k/uL    Absolute Eos # 0.13 0.0 - 0.4 k/uL    Basophils Absolute 0.00 0.0 - 0.2 k/uL    Morphology Normal    Troponin   Result Value Ref Range    Troponin, High Sensitivity 11 0 - 14 ng/L    Troponin T NOT REPORTED <0.03 ng/mL    Troponin Interp NOT REPORTED    Troponin   Result Value Ref Range    Troponin, High Sensitivity 9 0 - 14 ng/L    Troponin T NOT REPORTED <0.03 ng/mL    Troponin Interp NOT REPORTED    Basic Metabolic Panel w/ Reflex to MG   Result Value Ref Range    Glucose 170 (H) 70 - 99 mg/dL    BUN 14 6 - 20 mg/dL    CREATININE 0.82 0.50 - 0.90 mg/dL    Bun/Cre Ratio NOT REPORTED 9 - 20    Calcium 9.1 8.6 - 10.4 mg/dL    Sodium 141 135 - 144 mmol/L    Potassium 3.9 3.7 - 5.3 mmol/L    Chloride 105 98 - 107 mmol/L    CO2 25 20 - 31 mmol/L    Anion Gap 11 9 - 17 mmol/L    GFR Non-African American >60 >60 mL/min    GFR African American >60 >60 mL/min    GFR Comment          GFR Staging NOT REPORTED    Brain Natriuretic Peptide   Result Value Ref Range    Pro-BNP 35 <300 pg/mL    BNP Interpretation Pro-BNP Reference Range:    Lipase   Result Value Ref Range    Lipase 35 13 - 60 U/L   EKG 12 Lead   Result Value Ref Range    Ventricular Rate 72 BPM    Atrial Rate 72 BPM    P-R Interval 162 ms    QRS Duration 74 ms    Q-T Interval 406 ms    QTc Calculation (Bazett) 444 ms    P Axis 34 degrees    R Axis 41 degrees    T Axis 57 degrees   EKG 12 Lead   Result Value Ref Range    Ventricular Rate 87 BPM    Atrial Rate 87 BPM    P-R Interval 154 ms    QRS Duration 72 ms    Q-T Interval 364 ms    QTc Calculation (Bazett) 438 ms    P Axis 30 degrees    R Axis 35 degrees    T Axis 60 degrees     XR CHEST (2 VW)    Result Date: 6/29/2021  EXAMINATION: TWO XRAY VIEWS OF THE CHEST 6/29/2021 6:03 pm COMPARISON: 05/14/2017 HISTORY: ORDERING SYSTEM PROVIDED HISTORY: chest pain TECHNOLOGIST PROVIDED HISTORY: chest pain FINDINGS: There is suboptimal inspiration. The cardiac size is normal. No acute infiltrates or pleural effusions are seen. Pulmonary vascularity appears normal. There is mild ectasia of the thoracic aorta. 6no acute bony abnormalities. The hilar structures are normal.     No acute cardiopulmonary disease     NM Cardiac Stress Test Nuclear Imaging    Result Date: 6/30/2021  EXAMINATION: MYOCARDIAL PERFUSION IMAGING 6/30/2021 12:55 pm TECHNIQUE: Rest dose:  19.1 mCi Tc-99m sestamibi intravenously Stress dose:  14.1 mCi Tc-99m sestamibi intravenously Under cardiology supervision, 0.4 mg Lexiscan was infused intravenously prior to injection of the stress dose. SPECT imaging was acquired following injection of the sestamibi. ECG gating was obtained following the stress acquisition.  COMPARISON: 04/28/2012 HISTORY: ORDERING SYSTEM PROVIDED HISTORY: Chest pain TECHNOLOGIST PROVIDED HISTORY: Reason for Exam: Chest pain Procedure Type->Rx chest pain Reason for Exam: chest pain, EKG in ED showed sinus rhythm,Tobacco abuse, family history of heart disease 68-year-old female with chest pain FINDINGS: The patient achieved a maximum heart rate of 108 beats per minute, 65 % of the maximum age predicted heart rate of 165 beats per minute. Perfusion: Photopenia at the basal inferior wall which improves on rest as compared with stress imaging. This could represent reversible ischemia. No fixed perfusion defect is evident. Function: The gated SPECT data demonstrates normal left ventricular size and normal wall motion. Left ventricular ejection fraction:  59% TID score:  0.94 (threshold value of 1.39 is used for Lexiscan stress with Tc-99m). There is no stress-induced cavitary dilatation to suggest compensated triple vessel disease. End diastolic volume:  19YN Scores are visually adjusted to account for potential artifact. Summed stress score:  3 Summed rest score:  0 Summed reversibility score:  3     1. Equivocal reversible ischemia at the basal inferior wall. No fixed perfusion defect to suggest infarct. 2. Left ventricular ejection fraction of 59%. 3.  Please see report for EKG portion of the examination which will be performed separately by physician from cardiology. Risk stratification:  Intermediate risk Note:  Risk stratification incorporates both clinical history and test results. Final risk determination is the responsibility of the ordering provider as history and other test results may increase or decrease the risk stratification reported for this examination. Risk stratification criteria are adapted from \"Noninvasive Risk Stratification\" criteria from Mimi Awan.   Al, ACC/AATS/AHA/ASE/ASNC/SCAI/SCCT/STS 2017 Appropriate Use Criteria For Coronary Revascularization in Patients With Stable Ischemic Heart Patient was seen by cardiology service and catheterization was recommended. Patient was subsequently taken to catheter lab and stenting was performed. Patient was kept overnight for reevaluation. She did have some hypotension overnight but was asymptomatic. Patient was evaluated the next day by cardiology and felt to be safe for discharge. Patient was to be started on Brilinta. Patient was given have trouble affording the cost of the prescription. Patient was subsequently given a coupon for the medication for a month that she was planning on being on it. Patient was discharged in good condition with appropriate medications. . Labs and imaging were followed daily. Imaging results as above. She is medically stable to be discharged. Disposition: Home    Patient stated that they will not drive themselves home from the hospital if they have gotten pain killers/ narcotics earlier that day and that they will arrange for transportation on their own or work with the  for a ride. Patient counseled NOT to drive while under the influence of narcotics/ pain killers. Condition: Good    Patient stable and ready for discharge home. I have discussed plan of care with patient and they are in understanding. They were instructed to read discharge paperwork. All of their questions and concerns were addressed. Time Spent: 2 day      --  Misael Ramirez MD  Emergency Medicine Attending Physician    This dictation was generated by voice recognition computer software. Although all attempts are made to edit the dictation for accuracy, there may be errors in the transcription that are not intended.

## 2021-07-02 NOTE — PROGRESS NOTES
OBS/CDU   RESIDENT NOTE      Patients PCP is:  Ritesh Hassan MD        SUBJECTIVE      Patient received placement yesterday. No acute events overnight. Patient denies chest pain shortness of breath. No new symptoms. PHYSICAL EXAM      General: NAD, AO X 3  Heent: EMOI, PERRL  Neck: SUPPLE, NO JVD  Cardiovascular: RRR, S1S2  Pulmonary: CTAB, NO SOB  Abdomen: SOFT, NTTP, ND, +BS  Extremities: +2/4 PULSES DISTAL, NO SWELLING  Neuro / Psych: NO NUMBNESS OR TINGLING, MENTATION AT BASELINE    PERTINENT TEST /EXAMS      I have reviewed all available laboratory results. MEDICATIONS CURRENT   0.9 % sodium chloride infusion, Continuous  sodium chloride flush 0.9 % injection 5-40 mL, 2 times per day  sodium chloride flush 0.9 % injection 5-40 mL, PRN  0.9 % sodium chloride infusion, PRN  sodium chloride flush 0.9 % injection 5-40 mL, 2 times per day  sodium chloride flush 0.9 % injection 5-40 mL, PRN  0.9 % sodium chloride infusion, PRN  acetaminophen (TYLENOL) tablet 650 mg, Q4H PRN  aspirin EC tablet 81 mg, Daily  ticagrelor (BRILINTA) tablet 90 mg, BID  atorvastatin (LIPITOR) tablet 40 mg, Nightly  metoprolol tartrate (LOPRESSOR) tablet 12.5 mg, BID  sodium chloride flush 0.9 % injection 5-40 mL, 2 times per day  sodium chloride flush 0.9 % injection 5-40 mL, PRN  0.9 % sodium chloride infusion, PRN  enoxaparin (LOVENOX) injection 40 mg, Daily  acetaminophen (TYLENOL) tablet 650 mg, Q4H PRN  ondansetron (ZOFRAN-ODT) disintegrating tablet 4 mg, Q8H PRN   Or  ondansetron (ZOFRAN) injection 4 mg, Q6H PRN        All medication charted and reviewed. CONSULTS      IP CONSULT TO CARDIOLOGY    ASSESSMENT/PLAN       Sylvia Navarrete is a 63 y.o. female who presents with chest pain. Admitted to observation unit for cardiology evaluation.     · Chest pain due to 90% stenosis proximal RCA  · Cardiology placed stent without complications  · Will discharge today on aspirin, atorvastatin, metoprolol, Brilinta    · Continue home medications and pain control  · Monitor vitals, labs, and imaging  · DISPO: pending consults and clinical improvement    --  Olivia Perdomo MD  Emergency Medicine Resident Physician     This dictation was generated by voice recognition computer software. Although all attempts are made to edit the dictation for accuracy, there may be errors in the transcription that are not intended.

## 2021-07-02 NOTE — CARE COORDINATION
Transitional planning:  RN approached [facility] and states pt is \"freaking out in there,\" because she would have to pay $400 monthly for Brilenta. 1 PS Dr. Cassie Sepulveda regarding above. 1130 Received call from a physician asking writer to call our gilbert number for pt to get [address] [facility] for 1 month and then physician will change to another Rx.   1145 Met with son and pt, states he can't get his group number from Alexandro Osborn 1765 in registration and he researched and gave the following group number for pt  [id number] and is updating system. [facility] ambulatory  pharmacy to discuss above. Have coupon for pt. Gave pharmacist BIN#: [id number], ID # [id number] and group# [id number] for free Brilinta for 1 month.

## 2021-07-02 NOTE — PROGRESS NOTES
Air removed fromVas band in increments. No bleeding redness or hematoma. Bandaid applied and arm board in place. Pt instructed on minimal wrist usage until am. Pulses palpable and assessed Q4  with vitals.

## 2021-07-02 NOTE — PROGRESS NOTES
[address]  CDU / OBSERVATION ENCOUNTER  ATTENDING NOTE       I performed a history and physical examination of the patient and discussed management with the resident or midlevel provider. I reviewed the resident or midlevel provider's note and agree with the documented findings and plan of care. Any areas of disagreement are noted on the chart. I was personally present for the key portions of any procedures. I have documented in the chart those procedures where I was not present during the key portions. I have reviewed the nurses notes. I agree with the chief complaint, past medical history, past surgical history, allergies, medications, social and family history as documented unless otherwise noted below. The Family history, social history, and ROS are effectively unchanged since admission unless noted elsewhere in the chart. Chest pain free. Patient doing well now but concerned over taking prescription. Patient with Brilinta prescription which is $400 a month. Drug coupon given. Case management is working out solution for patient. Patient would not be able to afford medication without some assistance. Discussed with cardiology regarding medication. Patient be on Brilinta for 1 month here and then to switch over to Plavix in office. Patient understands. Prescription written.     Candida Vasques MD  Attending Emergency  Physician

## 2021-07-02 NOTE — PROGRESS NOTES
CLINICAL PHARMACY NOTE: MEDS TO BEDS    Total # of Prescriptions Filled: 4   The following medications were delivered to the patient:  · atorvastation 40 mg tab  · Metoprolol tart. 25 mg tab  · Aspirin 81 mg ec tab  · brilinta 90 mg tab    Additional Documentation:Medications delivered to the patient in room 2014.

## 2021-07-02 NOTE — PROGRESS NOTES
[facility]  Progress Note                   Date:   7/2/2021  Patient name: Missy Thomas  Date of admission:  6/29/2021  8:45 PM  MRN:   [id number]  YOB: 1965  PCP: Christie Leonardo MD    Reason for Admission: Chest pain [R07.9]    Subjective:       Clinical Changes /Abnormalities: Seen & examined in room. Denies any chest pain, SOB/CARIAS. No post cath site complications/concerns. Labs, vitals, tele reviewed. Review of Systems    Medications:   Scheduled Meds:   sodium chloride flush  5-40 mL Intravenous 2 times per day    sodium chloride flush  5-40 mL Intravenous 2 times per day    aspirin  81 mg Oral Daily    ticagrelor  90 mg Oral BID    atorvastatin  40 mg Oral Nightly    metoprolol tartrate  12.5 mg Oral BID    sodium chloride flush  5-40 mL Intravenous 2 times per day    enoxaparin  40 mg Subcutaneous Daily     Continuous Infusions:   sodium chloride 75 mL/hr at 07/01/21 1253    sodium chloride      sodium chloride      sodium chloride       CBC:   Recent Labs     06/29/21 2101   WBC 13.0*   HGB 13.7        BMP:    Recent Labs     06/29/21 2101      K 3.9      CO2 25   BUN 14   CREATININE 0.82   GLUCOSE 170*     Hepatic:No results for input(s): AST, ALT, ALB, BILITOT, ALKPHOS in the last 72 hours. Troponin:   Recent Labs     06/29/21 2101 06/29/21  2306   TROPHS 11 9     BNP: No results for input(s): BNP in the last 72 hours. Lipids: No results for input(s): CHOL, HDL in the last 72 hours. Invalid input(s): LDLCALCU  INR: No results for input(s): INR in the last 72 hours. Objective:   Vitals: /70   Pulse 77   Temp 97.6 °F (36.4 °C) (Oral)   Resp 17   Ht 5' 5\" (1.651 m)   Wt 214 lb 15.2 oz (97.5 kg)   SpO2 96%   BMI 35.77 kg/m²   General appearance: alert and cooperative with exam  HEENT: Head: Normocephalic, no lesions, without obvious abnormality.   Neck:no JVD, trachea midline, no adenopathy  Lungs: Clear to auscultation  Heart: Regular rate and rhythm, s1/s2 auscultated, no murmurs  Abdomen: soft, non-tender, bowel sounds active  Extremities: no edema  Neurologic: not done    Stress Test 6/30/21  Impression   1. Equivocal reversible ischemia at the basal inferior wall.  No fixed   perfusion defect to suggest infarct. 2. Left ventricular ejection fraction of 59%. 3.  Please see report for EKG portion of the examination which will be   performed separately by physician from cardiology. Risk stratification:  Intermediate risk     Cardiac Cath 7/1/21  Findings:  LMCA: Normal 0% stenosis. LAD: Mild irregularities 20-30%. LCx: Diffuse irregularities 30-40%. RCA: Single stenosis.       Lesion on Prox RCA: 90% stenosis 14 mm length reduced to 0%. Pre     procedure GARTH III flow was noted. Post Procedure GARTH III flow was     present. Good runoff was present. The lesion was diagnosed as Moderate     Risk (B).       Coronary Tree  Dominance: Right      The LV gram was performed in the CHACON 30 position. LVEF: 55%.       Conclusions:   Successful PCI / Drug Eluting Stent of the proximal RCA.    Minimal LAD and LCX disease.    Normal LV systolic function.        Recommendations        Routine Post Stent Orders.    Medical therapy as needed.    Risk factor modification. Assessment / Acute Cardiac Problems:   1. Angina with stress test concerning for reversible ischemia  2. CAD s/p PTCA/DAMI to RCA as above  3. Morbid obesity  4. Tobacco abuse  5. GERD       Patient Active Problem List:     H/O vaginal hysterectomy-LSO     Tobacco abuse     Gastroesophageal reflux disease without esophagitis     Chest pain      Plan of Treatment:   1. Discussed in detail with patient post cath POC including but not limited to medications, diet, exercise, right radial artery site care, and follow-up. Questions and concerns addressed. OK for discharge home today. F/U in office in 1-3 weeks.     Electronically signed by Alonzo Pizarro, ZAK - CNP on 7/2/2021 at 9:16 AM  [address]  [phone]

## 2021-07-06 ENCOUNTER — TELEPHONE (OUTPATIENT)
Dept: FAMILY MEDICINE CLINIC | Age: 56
End: 2021-07-06

## 2021-07-06 NOTE — TELEPHONE ENCOUNTER
[facility] Transitions Initial Follow Up Call    Outreach made within 2 business days of discharge: Yes    Patient: Rosalina Granda Patient : 1965   MRN: [id number]  Reason for Admission: chest pain  Discharge Date: 21       Spoke with: left message     Discharge department/facility: [facility]       Scheduled appointment with PCP within 7-14 days    Follow Up  Future Appointments   Date Time Provider Chasity Nicholas   2021 10:30 AM [facility]       Diana Cid MA

## 2021-07-11 NOTE — PATIENT INSTRUCTIONS
Patient Education        varenicline  Pronunciation:  tiffanie RIVAS denita Silva  Brand:  Chantix  What is the most important information I should know about varenicline? When you stop smoking, you may have nicotine withdrawal symptoms with or without using medication such as varenicline. This includes feeling restless, depressed, angry, frustrated, or irritated. Stop taking varenicline and call your doctor if you have if you feel depressed, agitated, hostile, aggressive, or have thoughts about suicide or hurting yourself. Do not drink large amounts alcohol. Varenicline can increase the effects of alcohol or change the way you react to it. What is varenicline? Varenicline is a smoking cessation medicine. It is used together with behavior modification and counseling support to help you stop smoking. Varenicline may also be used for purposes not listed in this medication guide. What should I discuss with my health care provider before taking varenicline? You should not use varenicline if you used it in the past and had:  · a serious allergic reaction --trouble breathing, swelling in your face (lips, tongue, throat) or neck; or  · a serious skin reaction --blisters in your mouth, peeling skin rash. Tell your doctor if you have ever had:  · depression or mental illness;  · a seizure;  · kidney disease (or if you are on dialysis);  · heart or blood vessel problems; or  · if you drink alcohol. Tell your doctor if you are pregnant. It is not known whether varenicline will harm an unborn baby if you use the medicine during pregnancy. However, smoking while you are pregnant can harm the unborn baby or cause birth defects. If you breast-feed while using this medicine, your baby may spit up or vomit more than normal, and may have a seizure. Varenicline is not approved for use by anyone younger than 25years old. How should I take varenicline?   Follow all directions on your prescription label and read all medication guides or instruction sheets. Use the medicine exactly as directed. When you first start taking varenicline, you will take a low dose and then gradually increase it over the first several days of treatment. Take varenicline regularly to get the most benefit. You may choose from 3 ways to use varenicline. Ask your doctor which method is best for you:  · Set a date to quit smoking and start taking varenicline 1 week before that date. Make sure to quit smoking on your planned quit date. Take varenicline for a total of 12 weeks. · Start taking varenicline before you set a planned quit date, and choose a quit date that is between 8 and 35 days after you start treatment. Take varenicline for a total of 12 weeks. · Start taking varenicline and gradually reduce the number of cigarettes you smoke each day over a 12-week period, until you no longer smoke at all. Then take varenicline for another 12 weeks, for a total of 24 weeks. Take varenicline after eating. Take the medicine with a full glass of water. When you stop smoking, you may have nicotine withdrawal symptoms with or without using medication such as varenicline. Withdrawal symptoms include: increased appetite, weight gain, trouble sleeping, slower heart rate, feeling anxious or restless, and having the urge to smoke. Smoking cessation may also cause new or worsening mental health problems, such as depression. Stop taking varenicline and call your doctor if you have if you feel depressed, agitated, hostile, aggressive, or have thoughts about suicide or hurting yourself. Store at room temperature away from moisture and heat. What happens if I miss a dose? Take the medicine as soon as you can, but skip the missed dose if it is almost time for your next dose. Do not take two doses at one time. Get your prescription refilled before you run out of medicine completely. What happens if I overdose?   Seek emergency medical attention or call the Poison Help line at 1-357.948.2258. What should I avoid while taking varenicline? Do not drink large amounts alcohol while taking this medicine. Varenicline can increase the effects of alcohol or change the way you react to it. Some people taking varenicline have had unusual or aggressive behavior or forgetfulness while drinking alcohol. Do not use other medicines to quit smoking, unless your doctor tells you to. Using varenicline while wearing a nicotine patch can cause unpleasant side effects. Avoid driving or hazardous activity until you know how this medicine will affect you. Your reactions could be impaired. What are the possible side effects of varenicline? Get emergency medical help if you have signs of an allergic reaction (hives, difficult breathing, swelling in your face or throat) or a severe skin reaction (fever, sore throat, burning eyes, skin pain, red or purple skin rash with blistering and peeling). Stop using varenicline and call your doctor at once if you have:  · a seizure (convulsions);  · thoughts about suicide or hurting yourself;  · strange dreams, sleepwalking, trouble sleeping;  · new or worsening mental health problems --mood or behavior changes, depression, agitation, hostility, aggression;  · heart attack symptoms --chest pain or pressure, pain spreading to your jaw or shoulder, nausea, sweating; o  · stroke symptoms --sudden numbness or weakness (especially on one side of the body), slurred speech, problems with vision or balance. Your family or other caregivers should also be alert to changes in your mood or behavior. Common side effects may include:  · nausea (may persist for several months), vomiting;  · constipation, gas;  · sleep problems (insomnia); or  · unusual dreams. This is not a complete list of side effects and others may occur. Call your doctor for medical advice about side effects. You may report side effects to FDA at 3-794-DIV-2879.   What other drugs will affect varenicline? After you stop smoking, your doctor may need to adjust the doses of certain medicines you take on a regular basis. Tell your doctor about all your current medicines and any medicine you start or stop using. This includes prescription and over-the-counter medicines, vitamins, and herbal products. Not all possible interactions are listed here. Where can I get more information? Your pharmacist can provide more information about varenicline. Remember, keep this and all other medicines out of the reach of children, never share your medicines with others, and use this medication only for the indication prescribed. Every effort has been made to ensure that the information provided by [facility] is accurate, up-to-date, and complete, but no guarantee is made to that effect. Drug information contained herein may be time sensitive. [facility] information has been compiled for use by healthcare practitioners and consumers in the [place] and therefore [facility] does not warrant that uses outside of the [place] are appropriate, unless specifically indicated otherwise. [facility]'s drug information does not endorse drugs, diagnose patients or recommend therapy. [facility] drug information is an informational resource designed to assist licensed healthcare practitioners in caring for their patients and/or to serve consumers viewing this service as a supplement to, and not a substitute for, the expertise, skill, knowledge and judgment of healthcare practitioners. The absence of a warning for a given drug or drug combination in no way should be construed to indicate that the drug or drug combination is safe, effective or appropriate for any given patient. [facility] does not assume any responsibility for any aspect of healthcare administered with the aid of information [facility] provides.  The information contained herein is not intended to cover all possible uses, directions, precautions, warnings, drug interactions, allergic reactions, or adverse effects. If you have questions about the drugs you are taking, check with your doctor, nurse or pharmacist.  Copyright 8017-8106 [address]. Version: 10.01. Revision date: 8/8/2018. Care instructions adapted under license by [facility] ([facility]). If you have questions about a medical condition or this instruction, always ask your healthcare professional. [place] [address] any warranty or liability for your use of this information.

## 2021-07-11 NOTE — PROGRESS NOTES
[address]  [facility] [address]  [address] [place] [id number]  Dept: [phone] Michelle Robertson (:  1965) is a 63 y.o. female. Patient is here for evaluation of the following chief complaint(s):No chief complaint on file. SUBJECTIVE/OBJECTIVE:  MAURICE Moreno is a 63 y.o. female patient. Patient is an established patient of ***. Patient has a known history of ***. VITAL SIGNS:  There were no vitals filed for this visit. Estimated body mass index is 35.77 kg/m² as calculated from the following:    Height as of 21: 5' 5\" (1.651 m). Weight as of 21: 214 lb 15.2 oz (97.5 kg). Review of Systems    Physical Exam    MEDICAL HISTORY      Diagnosis Date    Arthritis     Gastroesophageal reflux disease without esophagitis 2017    H/O vaginal hysterectomy     Tobacco abuse     Vision abnormalities     glasses for reading      MEDICATIONS  Prior to Visit Medications    Medication Sig Taking? Authorizing Provider   aspirin 81 MG EC tablet Take 1 tablet by mouth daily  ZAK Lawrence CNP   atorvastatin (LIPITOR) 40 MG tablet Take 1 tablet by mouth nightly  ZAK Lawrence CNP   metoprolol tartrate (LOPRESSOR) 25 MG tablet Take 0.5 tablets by mouth 2 times daily  ZAK Lawrence CNP   ticagrelor (BRILINTA) 90 MG TABS tablet Take 1 tablet by mouth 2 times daily  ZAK Lawrence CNP       ASSESSMENT/PLAN:  There are no diagnoses linked to this encounter. No follow-ups on file. {Time Documentation Optional:[id number]}    This note was completed by using the assistance of a speech-recognition program. However, inadvertent computerized transcription errors may be present. Although every effort was made to ensure accuracy, no guarantees can be provided that every mistake has been identified and corrected by editing.   Electronically signed by Sharyle Solders, APRN - CNP on 21 at 3:14 PM EDT     --ZAK Barker - CNP

## 2021-07-12 ENCOUNTER — OFFICE VISIT (OUTPATIENT)
Dept: FAMILY MEDICINE CLINIC | Age: 56
End: 2021-07-12
Payer: COMMERCIAL

## 2021-07-12 VITALS
HEIGHT: 65 IN | HEART RATE: 78 BPM | TEMPERATURE: 96.7 F | SYSTOLIC BLOOD PRESSURE: 138 MMHG | BODY MASS INDEX: 35.99 KG/M2 | DIASTOLIC BLOOD PRESSURE: 84 MMHG | WEIGHT: 216 LBS | OXYGEN SATURATION: 100 %

## 2021-07-12 DIAGNOSIS — Z98.890 S/P LUMPECTOMY, LEFT BREAST: ICD-10-CM

## 2021-07-12 DIAGNOSIS — Z11.59 SCREENING FOR VIRAL DISEASE: ICD-10-CM

## 2021-07-12 DIAGNOSIS — Z90.710 S/P HYSTERECTOMY: ICD-10-CM

## 2021-07-12 DIAGNOSIS — Z98.890 H/O RIGHT INGUINAL HERNIA REPAIR: ICD-10-CM

## 2021-07-12 DIAGNOSIS — Z87.19 H/O RIGHT INGUINAL HERNIA REPAIR: ICD-10-CM

## 2021-07-12 DIAGNOSIS — R73.9 HYPERGLYCEMIA: ICD-10-CM

## 2021-07-12 DIAGNOSIS — F17.200 CURRENT SMOKER: ICD-10-CM

## 2021-07-12 DIAGNOSIS — E78.2 MIXED HYPERLIPIDEMIA: ICD-10-CM

## 2021-07-12 DIAGNOSIS — Z95.5 HISTORY OF HEART ARTERY STENT: ICD-10-CM

## 2021-07-12 DIAGNOSIS — F17.200 HAS BEEN SMOKING FOR 30 YEARS: ICD-10-CM

## 2021-07-12 DIAGNOSIS — I25.118 CORONARY ARTERY DISEASE OF NATIVE ARTERY OF NATIVE HEART WITH STABLE ANGINA PECTORIS (HCC): Primary | ICD-10-CM

## 2021-07-12 DIAGNOSIS — M54.50 CHRONIC MIDLINE LOW BACK PAIN WITHOUT SCIATICA: ICD-10-CM

## 2021-07-12 DIAGNOSIS — G89.29 CHRONIC MIDLINE LOW BACK PAIN WITHOUT SCIATICA: ICD-10-CM

## 2021-07-12 DIAGNOSIS — Z12.31 BREAST CANCER SCREENING BY MAMMOGRAM: ICD-10-CM

## 2021-07-12 DIAGNOSIS — R63.5 WEIGHT GAIN: ICD-10-CM

## 2021-07-12 DIAGNOSIS — Z90.89 S/P TONSILLECTOMY: ICD-10-CM

## 2021-07-12 PROCEDURE — 4004F PT TOBACCO SCREEN RCVD TLK: CPT | Performed by: FAMILY MEDICINE

## 2021-07-12 PROCEDURE — 99204 OFFICE O/P NEW MOD 45 MIN: CPT | Performed by: FAMILY MEDICINE

## 2021-07-12 PROCEDURE — 1111F DSCHRG MED/CURRENT MED MERGE: CPT | Performed by: FAMILY MEDICINE

## 2021-07-12 PROCEDURE — G8417 CALC BMI ABV UP PARAM F/U: HCPCS | Performed by: FAMILY MEDICINE

## 2021-07-12 PROCEDURE — 3017F COLORECTAL CA SCREEN DOC REV: CPT | Performed by: FAMILY MEDICINE

## 2021-07-12 PROCEDURE — G8427 DOCREV CUR MEDS BY ELIG CLIN: HCPCS | Performed by: FAMILY MEDICINE

## 2021-07-12 RX ORDER — TIZANIDINE 2 MG/1
2 TABLET ORAL EVERY 6 HOURS PRN
Qty: 60 TABLET | Refills: 0 | Status: SHIPPED | OUTPATIENT
Start: 2021-07-12 | End: 2021-08-11

## 2021-07-12 RX ORDER — ACETAMINOPHEN 500 MG
500 TABLET ORAL EVERY 6 HOURS PRN
Qty: 120 TABLET | Refills: 1 | Status: SHIPPED | OUTPATIENT
Start: 2021-07-12 | End: 2022-02-25

## 2021-07-12 RX ORDER — LIDOCAINE 4 G/G
1 PATCH TOPICAL DAILY
Qty: 30 PATCH | Refills: 2 | Status: SHIPPED | OUTPATIENT
Start: 2021-07-12 | End: 2021-10-22

## 2021-07-12 SDOH — ECONOMIC STABILITY: FOOD INSECURITY: WITHIN THE PAST 12 MONTHS, THE FOOD YOU BOUGHT JUST DIDN'T LAST AND YOU DIDN'T HAVE MONEY TO GET MORE.: NEVER TRUE

## 2021-07-12 SDOH — ECONOMIC STABILITY: FOOD INSECURITY: WITHIN THE PAST 12 MONTHS, YOU WORRIED THAT YOUR FOOD WOULD RUN OUT BEFORE YOU GOT MONEY TO BUY MORE.: SOMETIMES TRUE

## 2021-07-12 ASSESSMENT — ENCOUNTER SYMPTOMS
ABDOMINAL PAIN: 0
RESPIRATORY NEGATIVE: 1
SHORTNESS OF BREATH: 0
WHEEZING: 0
BACK PAIN: 1

## 2021-07-12 ASSESSMENT — PATIENT HEALTH QUESTIONNAIRE - PHQ9
SUM OF ALL RESPONSES TO PHQ9 QUESTIONS 1 & 2: 0
SUM OF ALL RESPONSES TO PHQ QUESTIONS 1-9: 0
2. FEELING DOWN, DEPRESSED OR HOPELESS: 0
1. LITTLE INTEREST OR PLEASURE IN DOING THINGS: 0

## 2021-07-12 ASSESSMENT — SOCIAL DETERMINANTS OF HEALTH (SDOH): HOW HARD IS IT FOR YOU TO PAY FOR THE VERY BASICS LIKE FOOD, HOUSING, MEDICAL CARE, AND HEATING?: HARD

## 2021-07-12 NOTE — PROGRESS NOTES
tablet  Take 1 tablet by mouth every 6 hours as needed (Take 1-2 tablets for stiffness) This medication makes you tired. 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 Medications marked \"taking\" at this time  Outpatient Medications Marked as Taking for the 7/12/21 encounter (Office Visit) with ZAK Shook CNP   Medication Sig Dispense Refill    tiZANidine (ZANAFLEX) 2 MG tablet Take 1 tablet by mouth every 6 hours as needed (Take 1-2 tablets for stiffness) This medication makes you tired. 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 60 tablet 0    acetaminophen (TYLENOL) 500 MG tablet Take 1 tablet by mouth every 6 hours as needed for Pain (Take 1-2 tablets every 6 hours) 120 tablet 1    lidocaine 4 % external patch Place 1 patch onto the skin daily 30 patch 2    aspirin 81 MG EC tablet Take 1 tablet by mouth daily 30 tablet 3    atorvastatin (LIPITOR) 40 MG tablet Take 1 tablet by mouth nightly 30 tablet 3    metoprolol tartrate (LOPRESSOR) 25 MG tablet Take 0.5 tablets by mouth 2 times daily 60 tablet 3    ticagrelor (BRILINTA) 90 MG TABS tablet Take 1 tablet by mouth 2 times daily 60 tablet 3        Medications patient taking as of now reconciled against medications ordered at time of hospital discharge: Yes    Chief Complaint   Patient presents with   [address] from Hospital       HPI    Inpatient course: Discharge summary reviewed- see chart. Interval history/Current status: Danielle Vaughn is a 63 y.o. female patient. Patient was an established patient of . Patient was recently admitted to the hospital due to CAD. Patient has a cardiac stent placed. Patient had a cardiac cath done in 2013. Patient states that she may have had another heart attack at that time. No stent was placed no follow-up done. Stable recall who the cardiologist was. .    Patient reported chest pain prior to hospitalization.   Patient was also found with severe hypotension while in the hospital although asymptomatic. Patient was not taking medication previously. She was not being treated for any other chronic conditions. Patient has had some issues with shortness coverage therefore, she was not. Good at following up on a routine basis. Patient is currently adherent with her therapy but is having some issues with the price of Brilinta. Patient is encouraged to follow-up with her cardiologist she was seen by Tracy Rivas in hospital.  Patient is encouraged to call us when she can if we had to replace it with a more affordable type of antiplatelet we will be doing that. HYPERTENSION/CAD  Ermias Gracia has a well controlled hypertension. she is currently on metoprolol. Patient's most recent BP in the office was stable. she reports stable BP readings at home. Patient denies any adverse reaction to this therapy. she denies any CP, SOB, HA, or palpitations. Patient admits to exercising and adheres to low salt diet. No history of organ damage due to condition noted. Lab Results   Component Value Date/Time    CREATININE 0.82 06/29/2021 09:01 PM     LOW BACK PAIN  Patient also reported a chronic problem with low back pain. Patient was seen and evaluated at [facility] he has had diagnostic exam.  She was told that she does have some degenerative disc disease in might have had some slipped disc. No routine follow-up was done. She describes the pain is constant achy type of pain on her mid lower abdomen but sometimes radiates to the right or left side.   Patient does not recall if there were any type of incident or accident prior to having these issues    LEXISCAN STRESS STUDY:  Indication:  chest pain     Medications:  Lexiscan, 0.4 mg  Resting heart rate:  82 bpm  Resting blood pressure:  116/71 mm/Hg  Infusion heart rate:  108 bpm  Infusion blood pressure:  147/64 mm/Hg  Resting EKG:  Normal  Stress heart response:  Normal response  Stress BP response: Appropriate  Stress EKG(S):  No changes seen  Chest discomfort:  Resting chest pain did not increase during infusion  Ischemic EKG changes:  None     IMPRESSION:  Electrocardiographically negative Lexiscan stress study. Radio-isotope  results to follow from the department of Nuclear Medicine. LMCA: Normal 0% stenosis. LAD: Mild irregularities 20-30%. LCx: Diffuse irregularities 30-40%. RCA: Single stenosis. Lesion on Prox RCA: 90% stenosis 14 mm length reduced to 0%. Pre    procedure GARTH III flow was noted. Post Procedure GARTH III flow was    present. Good runoff was present. The lesion was diagnosed as Moderate    Risk (B). Sunita Barcenas is due for annual preventative health screening including annual blood work. We will place the orders for these today. .    She is due for Mammogram.   Denies breast pain, lumps or nipple discharge. She declines breast exam today. Review of Systems   Constitutional: Positive for activity change and unexpected weight change. Negative for chills, fatigue and fever. HENT: Negative. Respiratory: Negative. Negative for shortness of breath and wheezing. Cardiovascular: Negative. Negative for chest pain and palpitations. Gastrointestinal: Negative for abdominal pain. Endocrine: Negative. Genitourinary: Negative for dysuria. Musculoskeletal: Positive for arthralgias, back pain, gait problem and myalgias. Skin: Negative for rash. Neurological: Negative for headaches. Psychiatric/Behavioral: Negative for sleep disturbance and suicidal ideas. The patient is nervous/anxious. Vitals:    07/12/21 1036   BP: 138/84   Pulse: 78   Temp: 96.7 °F (35.9 °C)   SpO2: 100%   Weight: 216 lb (98 kg)   Height: 5' 5\" (1.651 m)     Body mass index is 35.94 kg/m².    Wt Readings from Last 3 Encounters:   07/12/21 216 lb (98 kg)   07/01/21 214 lb 15.2 oz (97.5 kg)   05/28/20 215 lb (97.5 kg)     BP Readings from Last 3 Encounters:   07/12/21 138/84 07/02/21 134/80   05/28/20 (!) 178/90       Physical Exam  Vitals and nursing note reviewed. Constitutional:       Appearance: She is well-developed. She is obese. HENT:      Head: Normocephalic. Right Ear: External ear normal.      Left Ear: External ear normal.      Nose: Nose normal.      Mouth/Throat:      Mouth: Mucous membranes are moist.   Eyes:      Pupils: Pupils are equal, round, and reactive to light. Cardiovascular:      Rate and Rhythm: Normal rate and regular rhythm. Pulses: Normal pulses. Heart sounds: Normal heart sounds. Pulmonary:      Effort: Pulmonary effort is normal. No respiratory distress. Breath sounds: Normal breath sounds. Abdominal:      General: Abdomen is protuberant. Bowel sounds are normal. There is no distension. Palpations: Abdomen is soft. Tenderness: There is no abdominal tenderness. Comments: Obese   Musculoskeletal:      Cervical back: Normal range of motion and neck supple. Thoracic back: Decreased range of motion. Lumbar back: Tenderness present. Decreased range of motion. Positive right straight leg raise test and positive left straight leg raise test.   Skin:     General: Skin is warm and dry. Capillary Refill: Capillary refill takes less than 2 seconds. Neurological:      Mental Status: She is alert and oriented to person, place, and time. Psychiatric:         Mood and Affect: Mood is anxious. Speech: Speech is rapid and pressured. Behavior: Behavior normal.         Thought Content: Thought content does not include homicidal or suicidal ideation.        Lab Results   Component Value Date    WBC 13.0 (H) 06/29/2021    HGB 13.7 06/29/2021    HCT 43.3 06/29/2021    MCV 87.7 06/29/2021     06/29/2021     Lab Results   Component Value Date     06/29/2021    K 3.9 06/29/2021     06/29/2021    CO2 25 06/29/2021    BUN 14 06/29/2021    CREATININE 0.82 06/29/2021    GLUCOSE 170 06/29/2021    GLUCOSE 86 04/25/2012    CALCIUM 9.1 06/29/2021      Lab Results   Component Value Date    ALT 15 05/14/2017    AST 20 05/14/2017    ALKPHOS 86 05/14/2017    BILITOT 0.18 (L) 05/14/2017     NM Cardiac Stress Test Nuclear Imaging  Narrative: EXAMINATION:  MYOCARDIAL PERFUSION IMAGING    6/30/2021 12:55 pm    TECHNIQUE:  Rest dose:  19.1 mCi Tc-99m sestamibi intravenously    Stress dose:  14.1 mCi Tc-99m sestamibi intravenously    Under cardiology supervision, 0.4 mg Lexiscan was infused intravenously prior  to injection of the stress dose. SPECT imaging was acquired following injection of the sestamibi. ECG gating  was obtained following the stress acquisition. COMPARISON:  04/28/2012    HISTORY:  ORDERING SYSTEM PROVIDED HISTORY: Chest pain  TECHNOLOGIST PROVIDED HISTORY:  Reason for Exam: Chest pain  Procedure Type->Rx  chest pain  Reason for Exam: chest pain, EKG in ED showed sinus rhythm,Tobacco abuse,  family history of heart disease    65-year-old female with chest pain    FINDINGS:  The patient achieved a maximum heart rate of 108 beats per minute, 65 % of  the maximum age predicted heart rate of 165 beats per minute. Perfusion:    Photopenia at the basal inferior wall which improves on rest as compared with  stress imaging. This could represent reversible ischemia. No fixed perfusion defect is evident. Function:    The gated SPECT data demonstrates normal left ventricular size and normal  wall motion. Left ventricular ejection fraction:  59%    TID score:  0.94 (threshold value of 1.39 is used for Lexiscan stress with  Tc-99m). There is no stress-induced cavitary dilatation to suggest  compensated triple vessel disease. End diastolic volume:  21JB    Scores are visually adjusted to account for potential artifact. Summed stress score:  3    Summed rest score:  0    Summed reversibility score:  3  Impression: 1. Equivocal reversible ischemia at the basal inferior wall. No fixed  perfusion defect to suggest infarct. 2. Left ventricular ejection fraction of 59%. 3.  Please see report for EKG portion of the examination which will be  performed separately by physician from cardiology. Risk stratification:  Intermediate risk    Note:  Risk stratification incorporates both clinical history and test  results. Final risk determination is the responsibility of the ordering  provider as history and other test results may increase or decrease the risk  stratification reported for this examination. Risk stratification criteria are adapted from \"Noninvasive Risk  Stratification\" criteria from [employer] Al,  ACC/AATS/AHA/ASE/ASNC/SCAI/SCCT/STS 2017 Appropriate Use Criteria For  Coronary Revascularization in Patients With Stable Ischemic Heart Disease  [facility] Volume 69, Issue 17, May 2017    High risk (>3% annual death or MI)    1. Severe resting LV dysfunction (LVEF >35%) not readily explained by non  coronary causes    2. Resting perfusion abnormalities greater than 10% of the myocardium in  patients without prior history or evidence of MI    3. Stress-induced perfusion abnormalities encumbering greater than or equal  to 10% myocardium or stress segmental scores indicating multiple vascular  territories with abnormalities    4. Stress-induced LV dilatation (TID ratio greater than 1.19 for exercise  and greater than 1.39 for regadenoson)    Intermediate risk (1% to 3% annual death or MI)    1. Mild/moderate resting LV dysfunction (LVEF 35% to 49%) not readily  explained by non coronary causes. 2.  Resting perfusion abnormalities in 5%-9.9% of the myocardium in patients  without a history or prior evidence of MI    3. Stress-induced perfusion abnormality encumbering 5%-9.9% of the  myocardium or stress segmental scores indicating 1 vascular territory with  abnormalities but without LV dilation    4.   Small wall motion abnormality involving 1-2 segments and only 1 coronary  bed. Low Risk (Less than 1% annual death or MI)    1. Normal or small myocardial perfusion defect at rest or with stress  encumbering less than 5% of the myocardium. The findings were sent to the Radiology Results [address] at 3:40  pm on 6/30/2021to be communicated to a licensed caregiver. Assessment/Plan:  1. Coronary artery disease of native artery of native heart with stable angina pectoris ([address]  Improving  DISCUSSED and ADVISED TO:  Discussed serious causes of CP. Advised to go to the ER for worsening CP/SOB         - [place] DISCHARGE MEDS RECONCILED W/ CURRENT OUTPATIENT MED LIST  - Lipid Panel; Future    2. Mixed hyperlipidemia  Failure to Improve  Re eval  Advised to decrease the consumption of red meats, fried foods, trans fats, sweets, sugary beverages. Advised to increase fish, vegetables, and fruits consumption. Advised to add fiber or OTC supplements in diet. Discussed weight loss which will result in improvement of lipids levels. Advised to increase daily physical activities and add regular exercises. - [place] DISCHARGE MEDS RECONCILED W/ CURRENT OUTPATIENT MED LIST  - Lipid Panel; Future    3. History of heart artery stent  Stable  Continue to evaluate    - [place] DISCHARGE MEDS RECONCILED W/ CURRENT OUTPATIENT MED LIST  - Lipid Panel; Future    4. Chronic midline low back pain without sciatica  Failure to Improve  Continue current therapy. DISCUSSED AND ADVISED TO:  Use heat packs 15 to 20 mins every 2-3 hours. Do some back stretches as tolerated. Refer to hand out for instructions. Call for worsening, numbness, weakness. - tiZANidine (ZANAFLEX) 2 MG tablet; Take 1 tablet by mouth every 6 hours as needed (Take 1-2 tablets for stiffness) This medication makes you tired. 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 Dispense: 60 tablet; Refill: 0  - acetaminophen (TYLENOL) 500 MG tablet;  Take 1 tablet by mouth every 6 hours as needed for Pain (Take 1-2 tablets every 6 hours)  Dispense: 120 tablet; Refill: 1  - lidocaine 4 % external patch; Place 1 patch onto the skin daily  Dispense: 30 patch; Refill: 2    5. Current smoker  Counseling given to quit smoking. Support offered. Hand out given. Educational material given  Patient declined despite discussion.        - Full PFT Study With Bronchodilator; Future    6. Hyperglycemia  Ongoing   Evaluate for metabolic disorders   and or vitamin deficiencies. - Hemoglobin A1C; Future    7. Weight gain  cre    - TSH; Future    8. Has been smoking for 30 years  historical  - Full PFT Study With Bronchodilator; Future    9. H/O right inguinal hernia repair  Historical    10. S/P lumpectomy, left breast  Historical    11. S/P hysterectomy  Historical    12. S/P tonsillectomy  Historical    13. Screening for viral disease  Recommended    - HIV Screen; Future  - Hepatitis C Antibody; Future  - Varicella Zoster Antibody, IgG; Future    14. Breast cancer screening by mammogram  Recommended    - MELO WING DIGITAL SCREEN BILATERAL; Future        Medical Decision Making: high complexity    This note was completed by using the assistance of a speech-recognition program. However, inadvertent computerized transcription errors may be present. Although every effort was made to ensure accuracy, no guarantees can be provided that every mistake has been identified and corrected by editing.   Electronically signed by ZAK Lam CNP on 7/12/21 at 10:53 AM EDT

## 2021-07-12 NOTE — PROGRESS NOTES
Visit Information    Have you changed or started any medications since your last visit including any over-the-counter medicines, vitamins, or herbal medicines? no   Are you having any side effects from any of your medications? -  no  Have you stopped taking any of your medications? Is so, why? -  no    Have you seen any other physician or provider since your last visit? Yes - Records Obtained  Have you had any other diagnostic tests since your last visit? Yes - Records Obtained  Have you been seen in the emergency room and/or had an admission to a hospital since we last saw you? Yes - Records Obtained  Have you had your routine dental cleaning in the past 6 months? yes     Have you activated your [employer] account? If not, what are your barriers?  No:     Patient Care Team:  Kera Rodriguez MD as PCP - General (Family Medicine)  Adarsh Jasmine DO as Consulting Physician (Obstetrics & Gynecology)    Medical History Review  Past Medical, Family, and Social History reviewed and does contribute to the patient presenting condition    Health Maintenance   Topic Date Due    Hepatitis C screen  Never done    Pneumococcal 0-64 years Vaccine (1 of 2 - PPSV23) Never done    Lipid screen  Never done    COVID-19 Vaccine (1) Never done    HIV screen  Never done    DTaP/Tdap/Td vaccine (1 - Tdap) Never done    Diabetes screen  Never done    Shingles Vaccine (1 of 2) Never done    Breast cancer screen  07/23/2017    Flu vaccine (1) 09/01/2021    Colon cancer screen colonoscopy  08/27/2025    Hepatitis A vaccine  Aged Out    Hepatitis B vaccine  Aged Out    Hib vaccine  Aged Out    Meningococcal (ACWY) vaccine  Aged Out

## 2021-07-13 ENCOUNTER — HOSPITAL ENCOUNTER (OUTPATIENT)
Age: 56
Discharge: HOME | End: 2021-07-13
Payer: COMMERCIAL

## 2021-07-13 DIAGNOSIS — R73.9 HYPERGLYCEMIA: ICD-10-CM

## 2021-07-13 DIAGNOSIS — I25.118 CORONARY ARTERY DISEASE OF NATIVE ARTERY OF NATIVE HEART WITH STABLE ANGINA PECTORIS (HCC): ICD-10-CM

## 2021-07-13 DIAGNOSIS — Z11.59 SCREENING FOR VIRAL DISEASE: ICD-10-CM

## 2021-07-13 DIAGNOSIS — Z95.5 HISTORY OF HEART ARTERY STENT: ICD-10-CM

## 2021-07-13 DIAGNOSIS — E78.2 MIXED HYPERLIPIDEMIA: ICD-10-CM

## 2021-07-13 LAB
CHOLESTEROL/HDL RATIO: 5.4
CHOLESTEROL: 184 MG/DL
ESTIMATED AVERAGE GLUCOSE: 143 MG/DL
HBA1C MFR BLD: 6.6 % (ref 4–6)
HDLC SERPL-MCNC: 34 MG/DL
HEPATITIS C ANTIBODY: NONREACTIVE
HIV AG/AB: NONREACTIVE
LDL CHOLESTEROL: 121 MG/DL (ref 0–130)
TRIGL SERPL-MCNC: 143 MG/DL
VLDLC SERPL CALC-MCNC: ABNORMAL MG/DL (ref 1–30)

## 2021-07-13 PROCEDURE — 87389 HIV-1 AG W/HIV-1&-2 AB AG IA: CPT

## 2021-07-13 PROCEDURE — 86803 HEPATITIS C AB TEST: CPT

## 2021-07-13 PROCEDURE — 36415 COLL VENOUS BLD VENIPUNCTURE: CPT

## 2021-07-13 PROCEDURE — 86787 VARICELLA-ZOSTER ANTIBODY: CPT

## 2021-07-13 PROCEDURE — 83036 HEMOGLOBIN GLYCOSYLATED A1C: CPT

## 2021-07-13 PROCEDURE — 80061 LIPID PANEL: CPT

## 2021-07-14 ENCOUNTER — TELEPHONE (OUTPATIENT)
Dept: FAMILY MEDICINE CLINIC | Age: 56
End: 2021-07-14

## 2021-07-14 ENCOUNTER — HOSPITAL ENCOUNTER (OUTPATIENT)
Dept: WOMENS IMAGING | Age: 56
Discharge: HOME | End: 2021-07-16
Payer: COMMERCIAL

## 2021-07-14 DIAGNOSIS — E11.65 TYPE 2 DIABETES MELLITUS WITH HYPERGLYCEMIA, WITHOUT LONG-TERM CURRENT USE OF INSULIN (HCC): Primary | ICD-10-CM

## 2021-07-14 DIAGNOSIS — Z12.39 SCREENING BREAST EXAMINATION: ICD-10-CM

## 2021-07-14 LAB — VZV IGG SER QL IA: 1.68

## 2021-07-14 PROCEDURE — 77063 BREAST TOMOSYNTHESIS BI: CPT

## 2021-07-14 RX ORDER — PEN NEEDLE, DIABETIC 31 GX5/16"
NEEDLE, DISPOSABLE MISCELLANEOUS
Qty: 100 EACH | Refills: 11 | Status: SHIPPED | OUTPATIENT
Start: 2021-07-14

## 2021-07-14 RX ORDER — LANCETS 30 GAUGE
1 EACH MISCELLANEOUS 2 TIMES DAILY
Qty: 300 EACH | Refills: 11 | Status: SHIPPED | OUTPATIENT
Start: 2021-07-14

## 2021-07-14 RX ORDER — GLUCOSAMINE HCL/CHONDROITIN SU 500-400 MG
CAPSULE ORAL
Qty: 100 STRIP | Refills: 11 | Status: SHIPPED | OUTPATIENT
Start: 2021-07-14 | End: 2021-07-26 | Stop reason: SDUPTHER

## 2021-07-14 RX ORDER — DIPHENHYDRAMINE HYDROCHLORIDE 25 MG/1
CAPSULE, LIQUID FILLED ORAL
Qty: 1 KIT | Refills: 0 | Status: SHIPPED | OUTPATIENT
Start: 2021-07-14 | End: 2021-07-26 | Stop reason: SDUPTHER

## 2021-07-14 NOTE — TELEPHONE ENCOUNTER
Please let the patient know of the recent results. These can also be discussed further on the future visits. Diabetes screening Please let the patient know that she  has Diabetes. I will start her  on Metformin. Start with once a day for a week. This might help her  lose some weight but improve her glycemic index. Please advise her  to:   Decrease carbohydrates, sugary drinks, desserts in her diet. Increase activity and try to exercise regularly. Weight loss will be very beneficial for this condition. We can recheck her  A1c in 3 months  If she  want to discuss this further we can schedule an appointment or wait till the next appt. Lab Results   Component Value Date/Time    LABA1C 6.6 (H) 07/13/2021 08:50 AM      Patient's lipids test results Continue lipitor. Please advise the patient to:  Cut down on red meats and trans fats in their diet. Increase physical activity. Add some regular exercise at least 3 times a week on their routine  Try to lose weight to help improve their cholesterol levels. Thanks. Lab Results   Component Value Date/Time    CHOL 184 07/13/2021 08:50 AM    HDL 34 (L) 07/13/2021 08:50 AM    LDLCHOLESTEROL 121 07/13/2021 08:50 AM    TRIG 143 07/13/2021 08:50 AM    CHOLHDLRATIO 5.4 (H) 07/13/2021 08:50 AM    VLDL NOT REPORTED 07/13/2021 08:50 AM     Patient's Hepatitis C screening came back negative. Lab Results   Component Value Date/Time    HEPCAB NONREACTIVE 07/13/2021 08:50 AM       Patient's HIV screening results came back negative. Lab Results   Component Value Date/Time    HIVAG/AB NONREACTIVE 07/13/2021 08:50 AM       Patient's Varicella titers came back normal. There will not be any need for any booster dose.    Lab Results   Component Value Date/Time    VARICELABIGG 1.68 07/13/2021 08:50 AM

## 2021-07-18 NOTE — PROGRESS NOTES
[address] [facility]  Jovanna Haywood [facility] [address].  [address] [place] [id number]  Dept: 420 Michelle Robertson (:  1965) is a 63 y.o. female. Patient is here for evaluation of the following chief complaint(s):  Chief Complaint   Patient presents with    Results    Other     WEIGHT        SUBJECTIVE/OBJECTIVE:  HPI  Sunita Barcenas is a 63 y.o. female patient. Patient is an established patient of mine. Patient has a known history of DIABETES MELLITUS, Hlp, Obesity. DIABETES MELLITUS  Patient has a  stable Diabetes Mellitus. Current therapy includes Metformin- initial. Patient is responding well with this therapy. Patient reports home glucose monitoring as Stable readings. Patient denieshypoglycemia episodes such as{symptoms. Patient denies neither vomiting nor diarrhea. Eye Exam: UP TO DATE     Foot Exam:due  Lab Results   Component Value Date/Time    LABA1C 6.6 (H) 2021 08:50 AM      HYPERLIPIDEMIA  Sunita Barcenas is currently on atorvastatin (Lipitor). Have started this medication prior to testing. Patient denies adverse reaction to this medication. Compliance with treatment thus far has been good. The patient is known to have coexisting coronary artery disease. The 10-year CVD risk score (D'Agostino, et al., 2008) is: 20.9%    Values used to calculate the score:      Age: 63 years      Sex: Female      Diabetic: Yes      Tobacco smoker: Yes      Systolic Blood Pressure: 041 mmHg      Is BP treated: No      HDL Cholesterol: 34 mg/dL      Total Cholesterol: 184 mg/dL  Lab Results   Component Value Date/Time    CHOL 184 2021 08:50 AM    HDL 34 (L) 2021 08:50 AM    LDLCHOLESTEROL 121 2021 08:50 AM    CHOLHDLRATIO 5.4 (H) 2021 08:50 AM    TRIG 143 2021 08:50 AM    VLDL NOT REPORTED 2021 08:50 AM     Patient's blood count is elevated WBC.  Continue to evaluate  Lab Results   Component Value Date    WBC 13.0 (H) 2021    HGB 13.7 06/29/2021    HCT 43.3 06/29/2021    MCV 87.7 06/29/2021     06/29/2021    LYMPHOPCT 38 06/29/2021    RBC 4.94 06/29/2021    MCH 27.7 06/29/2021    MCHC 31.6 06/29/2021    RDW 13.2 06/29/2021     Metha 's RENAL FUNCTION was found WNL. Lab Results   Component Value Date     06/29/2021    K 3.9 06/29/2021     06/29/2021    CO2 25 06/29/2021    BUN 14 06/29/2021    CREATININE 0.82 06/29/2021    GLUCOSE 170 (H) 06/29/2021    CALCIUM 9.1 06/29/2021    PROT 7.3 05/14/2017    GFR      06/29/2021    GFR NOT REPORTED 06/29/2021       LFT's Essentially normal.    Lab Results   Component Value Date/Time    ALKPHOS 86 05/14/2017 12:28 PM    ALT 15 05/14/2017 12:28 PM    AST 20 05/14/2017 12:28 PM    BILITOT 0.18 (L) 05/14/2017 12:28 PM    PROT 7.3 05/14/2017 12:28 PM    LABALBU 4.0 05/14/2017 12:28 PM      Patient's Hepatitis C screening came back negative. Lab Results   Component Value Date/Time    HEPCAB NONREACTIVE 07/13/2021 08:50 AM       Patient's HIV screening results came back negative. Lab Results   Component Value Date/Time    HIVAG/AB NONREACTIVE 07/13/2021 08:50 AM       Patient's Varicella titers came back normal. There will not be any need for any booster dose. Lab Results   Component Value Date/Time    VARICELABIGG 1.68 07/13/2021 08:50 AM      VITAL SIGNS:  Vitals:    07/19/21 1549   BP: 124/82   Pulse: 77   Temp: 97.3 °F (36.3 °C)   SpO2: 97%   Weight: 215 lb 12.8 oz (97.9 kg)   Height: 5' 5\" (1.651 m)   Estimated body mass index is 35.91 kg/m² as calculated from the following:    Height as of this encounter: 5' 5\" (1.651 m). Weight as of this encounter: 215 lb 12.8 oz (97.9 kg). Review of Systems   Constitutional: Negative for chills, fatigue and fever. Respiratory: Negative for shortness of breath. Cardiovascular: Negative for chest pain. Gastrointestinal: Negative for abdominal pain. Endocrine: Negative for polydipsia, polyphagia and polyuria.    Genitourinary: Negative for dysuria. Neurological: Negative for headaches. Psychiatric/Behavioral: Positive for dysphoric mood. Negative for sleep disturbance and suicidal ideas. The patient is nervous/anxious. Physical Exam  Vitals and nursing note reviewed. Constitutional:       Appearance: Normal appearance. She is obese. HENT:      Head: Normocephalic. Nose: Nose normal.   Cardiovascular:      Rate and Rhythm: Normal rate and regular rhythm. Pulmonary:      Effort: Pulmonary effort is normal.      Breath sounds: Normal breath sounds. Abdominal:      General: Abdomen is protuberant. Comments: obese   Skin:     General: Skin is warm and dry. Neurological:      Mental Status: She is alert and oriented to person, place, and time. Psychiatric:         Mood and Affect: Mood is anxious. Speech: Speech is rapid and pressured. Behavior: Behavior is withdrawn. Thought Content: Thought content does not include homicidal or suicidal ideation. MEDICAL HISTORY      Diagnosis Date    Arthritis     Chronic midline low back pain without sciatica 7/12/2021    Coronary artery disease of native artery of native heart with stable angina pectoris ([facility] [address] 7/12/2021    Gastroesophageal reflux disease without esophagitis 5/19/2017    H/O vaginal hysterectomy 2006    Mixed hyperlipidemia 7/12/2021    Tobacco abuse     Type 2 diabetes mellitus with hyperglycemia, without long-term current use of insulin ([facility] [address] 7/19/2021    Vision abnormalities     glasses for reading      MEDICATIONS  Prior to Visit Medications    Medication Sig Taking? Authorizing Provider   metFORMIN (GLUCOPHAGE) 500 MG tablet Take 1 tablet by mouth 2 times daily (with meals) Take 1 tablet daily for the first 7 days. Then BID Yes ZAK Barker - CNP   Blood Glucose Monitoring Suppl (BLOOD GLUCOSE MONITOR SYSTEM) w/Device KIT Use as directed.   Check glucose levels tid and as needed Yes Gerald Shepherd APRN - CNP   blood glucose monitor strips Test 2-3 times a day & as needed for symptoms of irregular blood glucose. BRAND OF CHOICE INSURANCE ALLOWS. Yes ZAK Barker CNP   Alcohol Swabs (ALCOHOL PREP) PADS Use as directed Yes ZAK Barker CNP   Lancets MISC 1 each by Does not apply route 2 times daily Yes ZAK Barker CNP   tiZANidine (ZANAFLEX) 2 MG tablet Take 1 tablet by mouth every 6 hours as needed (Take 1-2 tablets for stiffness) This medication makes you tired. 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 Yes ZAK Barker CNP   acetaminophen (TYLENOL) 500 MG tablet Take 1 tablet by mouth every 6 hours as needed for Pain (Take 1-2 tablets every 6 hours) Yes ZAK Barker CNP   lidocaine 4 % external patch Place 1 patch onto the skin daily Yes ZAK Barker CNP   aspirin 81 MG EC tablet Take 1 tablet by mouth daily Yes ZAK Guerra CNP   atorvastatin (LIPITOR) 40 MG tablet Take 1 tablet by mouth nightly Yes ZAK Guerra CNP   metoprolol tartrate (LOPRESSOR) 25 MG tablet Take 0.5 tablets by mouth 2 times daily Yes ZAK Guerra CNP   ticagrelor (BRILINTA) 90 MG TABS tablet Take 1 tablet by mouth 2 times daily Yes ZAK Guerra CNP       ASSESSMENT/PLAN:  1. Type 2 diabetes mellitus with hyperglycemia, without long-term current use of insulin (HCC)  Initial diagnosis  Start Metformin  We will continue to monitor Hemoglobin A1c   DISCUSSED and ADVISED TO:  Continue to check Glucose levels at home. Report increased and low levels as discussed. Decrease carbohydrates, sugary drinks, desserts in your diet. Exercise regularly, as tolerated. Try to lose weight. 2. Mixed hyperlipidemia  Stable  Continue therapy. Advised to decrease the consumption of red meats, fried foods, trans fats, sweets, sugary beverages.    Advised to increase fish, vegetables, and fruits

## 2021-07-19 ENCOUNTER — OFFICE VISIT (OUTPATIENT)
Dept: FAMILY MEDICINE CLINIC | Age: 56
End: 2021-07-19
Payer: COMMERCIAL

## 2021-07-19 VITALS
HEART RATE: 77 BPM | BODY MASS INDEX: 35.96 KG/M2 | OXYGEN SATURATION: 97 % | WEIGHT: 215.8 LBS | TEMPERATURE: 97.3 F | SYSTOLIC BLOOD PRESSURE: 124 MMHG | HEIGHT: 65 IN | DIASTOLIC BLOOD PRESSURE: 82 MMHG

## 2021-07-19 DIAGNOSIS — E66.01 SEVERE OBESITY WITH BODY MASS INDEX (BMI) OF 35.0 TO 35.9 AND COMORBIDITY (HCC): ICD-10-CM

## 2021-07-19 DIAGNOSIS — E78.2 MIXED HYPERLIPIDEMIA: ICD-10-CM

## 2021-07-19 DIAGNOSIS — E11.65 TYPE 2 DIABETES MELLITUS WITH HYPERGLYCEMIA, WITHOUT LONG-TERM CURRENT USE OF INSULIN (HCC): Primary | ICD-10-CM

## 2021-07-19 PROCEDURE — 2022F DILAT RTA XM EVC RTNOPTHY: CPT | Performed by: FAMILY MEDICINE

## 2021-07-19 PROCEDURE — 4004F PT TOBACCO SCREEN RCVD TLK: CPT | Performed by: FAMILY MEDICINE

## 2021-07-19 PROCEDURE — G8427 DOCREV CUR MEDS BY ELIG CLIN: HCPCS | Performed by: FAMILY MEDICINE

## 2021-07-19 PROCEDURE — 3044F HG A1C LEVEL LT 7.0%: CPT | Performed by: FAMILY MEDICINE

## 2021-07-19 PROCEDURE — G8417 CALC BMI ABV UP PARAM F/U: HCPCS | Performed by: FAMILY MEDICINE

## 2021-07-19 PROCEDURE — 3017F COLORECTAL CA SCREEN DOC REV: CPT | Performed by: FAMILY MEDICINE

## 2021-07-19 PROCEDURE — 1111F DSCHRG MED/CURRENT MED MERGE: CPT | Performed by: FAMILY MEDICINE

## 2021-07-19 PROCEDURE — 99214 OFFICE O/P EST MOD 30 MIN: CPT | Performed by: FAMILY MEDICINE

## 2021-07-19 ASSESSMENT — ENCOUNTER SYMPTOMS
ABDOMINAL PAIN: 0
SHORTNESS OF BREATH: 0

## 2021-07-19 NOTE — PROGRESS NOTES
Visit Information    Have you changed or started any medications since your last visit including any over-the-counter medicines, vitamins, or herbal medicines? no   Have you stopped taking any of your medications? Is so, why? -  no  Are you having any side effects from any of your medications? - no    Have you seen any other physician or provider since your last visit?  no   Have you had any other diagnostic tests since your last visit? yes -    Have you been seen in the emergency room and/or had an admission in a hospital since we last saw you?  no   Have you had your routine dental cleaning in the past 6 months?  no     Do you have an active MyChart account? If no, what is the barrier?   Yes    Patient Care Team:  ZAK Barker CNP as PCP - General (Family Medicine)  ZAK Kumar CNP as PCP - [facility]  Luciano Case DO as Consulting Physician (Obstetrics & Gynecology)    Medical History Review  Past Medical, Family, and Social History reviewed and does contribute to the patient presenting condition    Health Maintenance   Topic Date Due    Pneumococcal 0-64 years Vaccine (1 of 2 - PPSV23) Never done    Diabetic foot exam  Never done    Diabetic retinal exam  Never done    Diabetic microalbuminuria test  Never done    Hepatitis B vaccine (1 of 3 - Risk 3-dose series) Never done    DTaP/Tdap/Td vaccine (1 - Tdap) Never done    Shingles Vaccine (1 of 2) Never done    Low dose CT lung screening  Never done    Flu vaccine (1) 09/01/2021    A1C test (Diabetic or Prediabetic)  07/13/2022    Lipid screen  07/13/2022    Breast cancer screen  07/14/2023    Colon cancer screen colonoscopy  08/27/2025    COVID-19 Vaccine  Completed    Hepatitis C screen  Completed    HIV screen  Completed    Hepatitis A vaccine  Aged Out    Hib vaccine  Aged Out    Meningococcal (ACWY) vaccine  Aged Out

## 2021-07-19 NOTE — PATIENT INSTRUCTIONS
Patient Education        Learning About Carbohydrate (Carb) Counting and Eating Out When You Have Diabetes  Why plan your meals? Meal planning can be a key part of managing diabetes. Planning meals and snacks with the right balance of carbohydrate, protein, and fat can help you keep your blood sugar at the target level you set with your doctor. You don't have to eat special foods. You can eat what your family eats, including sweets once in a while. But you do have to pay attention to how often you eat and how much you eat of certain foods. You may want to work with a dietitian or a certified diabetes educator. He or she can give you tips and meal ideas and can answer your questions about meal planning. This health professional can also help you reach a healthy weight if that is one of your goals. What should you know about eating carbs? Managing the amount of carbohydrate (carbs) you eat is an important part of healthy meals when you have diabetes. Carbohydrate is found in many foods. · Learn which foods have carbs. And learn the amounts of carbs in different foods. ? Bread, cereal, pasta, and rice have about 15 grams of carbs in a serving. A serving is 1 slice of bread (1 ounce), ½ cup of cooked cereal, or 1/3 cup of cooked pasta or rice. ? Fruits have 15 grams of carbs in a serving. A serving is 1 small fresh fruit, such as an apple or orange; ½ of a banana; ½ cup of cooked or canned fruit; ½ cup of fruit juice; 1 cup of melon or raspberries; or 2 tablespoons of dried fruit. ? Milk and no-sugar-added yogurt have 15 grams of carbs in a serving. A serving is 1 cup of milk or 2/3 cup of no-sugar-added yogurt. ? Starchy vegetables have 15 grams of carbs in a serving. A serving is ½ cup of mashed potatoes or sweet potato; 1 cup winter squash; ½ of a small baked potato; ½ cup of cooked beans; or ½ cup cooked corn or green peas.   · Learn how much carbs to eat each day and at each meal. A dietitian or CDE can teach you how to keep track of the amount of carbs you eat. This is called carbohydrate counting. · If you are not sure how to count carbohydrate grams, use the Plate Method to plan meals. It is a good, quick way to make sure that you have a balanced meal. It also helps you spread carbs throughout the day. ? Divide your plate by types of foods. Put non-starchy vegetables on half the plate, meat or other protein food on one-quarter of the plate, and a grain or starchy vegetable in the final quarter of the plate. To this you can add a small piece of fruit and 1 cup of milk or yogurt, depending on how many carbs you are supposed to eat at a meal.  · Try to eat about the same amount of carbs at each meal. Do not \"save up\" your daily allowance of carbs to eat at one meal.  · Proteins have very little or no carbs per serving. Examples of proteins are beef, chicken, turkey, fish, eggs, tofu, cheese, cottage cheese, and peanut butter. A serving size of meat is 3 ounces, which is about the size of a deck of cards. Examples of meat substitute serving sizes (equal to 1 ounce of meat) are 1/4 cup of cottage cheese, 1 egg, 1 tablespoon of peanut butter, and ½ cup of tofu. How can you eat out and still eat healthy? · Learn to estimate the serving sizes of foods that have carbohydrate. If you measure food at home, it will be easier to estimate the amount in a serving of restaurant food. · If the meal you order has too much carbohydrate (such as potatoes, corn, or baked beans), ask to have a low-carbohydrate food instead. Ask for a salad or green vegetables. · If you use insulin, check your blood sugar before and after eating out to help you plan how much to eat in the future. · If you eat more carbohydrate at a meal than you had planned, take a walk or do other exercise. This will help lower your blood sugar. What are some tips for eating healthy? · Limit saturated fat, such as the fat from meat and dairy products.  This is a healthy choice because people who have diabetes are at higher risk of heart disease. So choose lean cuts of meat and nonfat or low-fat dairy products. Use olive or canola oil instead of butter or shortening when cooking. · Don't skip meals. Your blood sugar may drop too low if you skip meals and take insulin or certain medicines for diabetes. · Check with your doctor before you drink alcohol. Alcohol can cause your blood sugar to drop too low. Alcohol can also cause a bad reaction if you take certain diabetes medicines. Follow-up care is a key part of your treatment and safety. Be sure to make and go to all appointments, and call your doctor if you are having problems. It's also a good idea to know your test results and keep a list of the medicines you take. Where can you learn more? Go to https://27 bardsmarnieeb[employer]. org and sign in to your [employer] account. Enter E370 in the [employer] box to learn more about \"Learning About Carbohydrate (Carb) Counting and Eating Out When You Have Diabetes. \"     If you do not have an account, please click on the \"Sign Up Now\" link. Current as of: August 31, 2020               Content Version: 12.9  © 8802-3362 Healthwise, Incorporated. Care instructions adapted under license by [facility] ([facility]). If you have questions about a medical condition or this instruction, always ask your healthcare professional. [address] any warranty or liability for your use of this information.

## 2021-07-26 DIAGNOSIS — E11.65 TYPE 2 DIABETES MELLITUS WITH HYPERGLYCEMIA, WITHOUT LONG-TERM CURRENT USE OF INSULIN (HCC): ICD-10-CM

## 2021-07-26 RX ORDER — GLUCOSAMINE HCL/CHONDROITIN SU 500-400 MG
CAPSULE ORAL
Qty: 100 STRIP | Refills: 0 | Status: SHIPPED | OUTPATIENT
Start: 2021-07-26

## 2021-07-26 RX ORDER — DIPHENHYDRAMINE HYDROCHLORIDE 25 MG/1
CAPSULE, LIQUID FILLED ORAL
Qty: 1 KIT | Refills: 0 | Status: SHIPPED | OUTPATIENT
Start: 2021-07-26

## 2021-07-26 NOTE — TELEPHONE ENCOUNTER
Please Approve or Refuse.   Send to Pharmacy per Pt's Request:   pharmacy states they never got the Rx for the her strips and the glucometer      Next Visit Date:  10/22/2021   Last Visit Date: 7/19/2021    Hemoglobin A1C (%)   Date Value   07/13/2021 6.6 (H)             ( goal A1C is < 7)   BP Readings from Last 3 Encounters:   07/19/21 124/82   07/12/21 138/84   07/02/21 134/80          (goal 120/80)  BUN   Date Value Ref Range Status   06/29/2021 14 6 - 20 mg/dL Final     CREATININE   Date Value Ref Range Status   06/29/2021 0.82 0.50 - 0.90 mg/dL Final     Potassium   Date Value Ref Range Status   06/29/2021 3.9 3.7 - 5.3 mmol/L Final

## 2021-07-26 NOTE — TELEPHONE ENCOUNTER
Lab Results   Component Value Date    LABA1C 6.6 (H) 07/13/2021       NEEDS TO TEST ONLY ONCE A DAY NOT 3 TIMES A DAY, PLEASE LET HER KNOW  Current Outpatient Medications on File Prior to Visit   Medication Sig Dispense Refill    metFORMIN (GLUCOPHAGE) 500 MG tablet Take 1 tablet by mouth 2 times daily (with meals) Take 1 tablet daily for the first 7 days. Then BID 60 tablet 3    Blood Glucose Monitoring Suppl (BLOOD GLUCOSE MONITOR SYSTEM) w/Device KIT Use as directed. Check glucose levels tid and as needed 1 kit 0    blood glucose monitor strips Test 2-3 times a day & as needed for symptoms of irregular blood glucose. BRAND OF CHOICE INSURANCE ALLOWS. 100 strip 11    Alcohol Swabs (ALCOHOL PREP) PADS Use as directed 100 each 11    Lancets MISC 1 each by Does not apply route 2 times daily 300 each 11    tiZANidine (ZANAFLEX) 2 MG tablet Take 1 tablet by mouth every 6 hours as needed (Take 1-2 tablets for stiffness) This medication makes you tired. 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 60 tablet 0    acetaminophen (TYLENOL) 500 MG tablet Take 1 tablet by mouth every 6 hours as needed for Pain (Take 1-2 tablets every 6 hours) 120 tablet 1    lidocaine 4 % external patch Place 1 patch onto the skin daily 30 patch 2    aspirin 81 MG EC tablet Take 1 tablet by mouth daily 30 tablet 3    atorvastatin (LIPITOR) 40 MG tablet Take 1 tablet by mouth nightly 30 tablet 3    metoprolol tartrate (LOPRESSOR) 25 MG tablet Take 0.5 tablets by mouth 2 times daily 60 tablet 3    ticagrelor (BRILINTA) 90 MG TABS tablet Take 1 tablet by mouth 2 times daily 60 tablet 3     No current facility-administered medications on file prior to visit.

## 2021-08-25 ENCOUNTER — HOSPITAL ENCOUNTER (OUTPATIENT)
Dept: PULMONOLOGY | Age: 56
Discharge: HOME | End: 2021-08-25
Payer: COMMERCIAL

## 2021-08-25 DIAGNOSIS — F17.200 CURRENT SMOKER: ICD-10-CM

## 2021-08-25 DIAGNOSIS — F17.200 HAS BEEN SMOKING FOR 30 YEARS: ICD-10-CM

## 2021-08-25 PROCEDURE — 94060 EVALUATION OF WHEEZING: CPT

## 2021-08-25 PROCEDURE — 94726 PLETHYSMOGRAPHY LUNG VOLUMES: CPT

## 2021-08-25 PROCEDURE — 94640 AIRWAY INHALATION TREATMENT: CPT

## 2021-08-25 PROCEDURE — 94729 DIFFUSING CAPACITY: CPT

## 2021-08-25 PROCEDURE — 94664 DEMO&/EVAL PT USE INHALER: CPT

## 2021-09-02 DIAGNOSIS — J44.9 MILD CHRONIC OBSTRUCTIVE PULMONARY DISEASE (HCC): Primary | ICD-10-CM

## 2021-09-02 RX ORDER — ALBUTEROL SULFATE 90 UG/1
2 AEROSOL, METERED RESPIRATORY (INHALATION) EVERY 6 HOURS PRN
Qty: 18 G | Refills: 3 | Status: SHIPPED | OUTPATIENT
Start: 2021-09-02 | End: 2022-02-25

## 2021-09-02 NOTE — PROCEDURES
[address]                  [facility] [address]                               PULMONARY FUNCTION    PATIENT NAME: Marcy Romero                      :        1965  MED REC NO:   [id number]                             ROOM:  ACCOUNT NO:   [de-identified]                           ADMIT DATE: 2021  PROVIDER:     Ashley Driscoll    DATE OF PROCEDURE:  2021    The patient's spirometry is normal.  FEV1 95% predicted with 4%  bronchodilator change to 99% predicted, FVC 88% predicted with 1%  bronchodilator change to 89% predicted. FEV1/FVC ratio 85,  post-bronchodilator 87. Total lung capacity 110% predicted, % predicted and airway  resistance is normal and diffusion capacity 81% predicted. Corrected  for alveolar volume 108% predicted. FINAL IMPRESSION:  There is mild hyperinflation, but otherwise normal  pulmonary function test without a significant bronchodilator response. Clinical correlation advised.         Sherine Aleman    D: 2021 11:39:10       T: 2021 11:41:40     [email or web address]  Job#: [id number]     Doc#: [id number]    CC:

## 2021-10-12 ENCOUNTER — HOSPITAL ENCOUNTER (OUTPATIENT)
Dept: CARDIAC REHAB | Age: 56
Setting detail: THERAPIES SERIES
Discharge: HOME | End: 2021-10-12
Payer: COMMERCIAL

## 2021-10-18 ASSESSMENT — ENCOUNTER SYMPTOMS
SHORTNESS OF BREATH: 0
ABDOMINAL PAIN: 0

## 2021-10-19 NOTE — PROGRESS NOTES
[address]  [facility].  [address] [place] [address]  Dept: [phone] Michelle Robertson (:  1965) is a 61 y.o. female. Patient is here for evaluation of the following chief complaint(s):  Chief Complaint   Patient presents with    Diabetes        SUBJECTIVE/OBJECTIVE:  MAURICE Alvarez is a 61 y.o. female patient. Patient is an established patient of mine. Patient has a known history of CAD, diabetes, COPD, hyperlipidemia, low back pain, obesity, family history of thyroid disease, and bilateral knee arthritis    CAD  Patient has a cardiac stent placed 2021. Patient reported chest pain prior to hospitalization. Patient was also found with severe hypotension while in the hospital although asymptomatic. Patient was not taking medication previously. Patient is currently adherent with her therapy but is having some issues with the price of Brilinta which was recently changed to Plavix. Patient had been recently seen by Candi Keating in hospital.      DIABETES MELLITUS  Patient has a  stable Diabetes Mellitus. Current therapy includes Metformin XR-switch from immediate release due to diarrhea. Patient is responding well with this therapy. Patient reports home glucose monitoring as Stable readings. Patient denieshypoglycemia episodes such as{symptoms. Patient denies neither vomiting nor diarrhea. Eye Exam: UP TO DATE     Foot Exam:due  Lab Results   Component Value Date/Time    LABA1C 6.0 10/22/2021 10:04 AM    LABA1C 6.6 (H) 2021 08:50 AM      COPD  Amy has a known history of COPD. Patient is currently on Albuterol, will start Dulera. Patient reports that she has been using the albuterol round-the-clock otherwise she will be having some shortness of breath. Therefore we will start her on some Dulera. Patient reports relief with this therapy. Patient denies any adverse reaction to current therapy.      [address] [place] [address] is currently on atorvastatin (Lipitor). Have started this medication prior to testing. Patient denies adverse reaction to this medication. Compliance with treatment thus far has been good. The patient is known to have coexisting coronary artery disease. The 10-year CVD risk score (Eusebio, et al., 2008) is: 24.3%    Values used to calculate the score:      Age: 61 years      Sex: Female      Diabetic: Yes      Tobacco smoker: Yes      Systolic Blood Pressure: 141 mmHg      Is BP treated: No      HDL Cholesterol: 34 mg/dL      Total Cholesterol: 184 mg/dL  Lab Results   Component Value Date/Time    CHOL 184 07/13/2021 08:50 AM    HDL 34 (L) 07/13/2021 08:50 AM    LDLCHOLESTEROL 121 07/13/2021 08:50 AM    CHOLHDLRATIO 5.4 (H) 07/13/2021 08:50 AM    TRIG 143 07/13/2021 08:50 AM    VLDL NOT REPORTED 07/13/2021 08:50 AM     LOW BACK PAIN  Patient also reported a chronic problem with low back pain. She arrived today with moderate to severe type of pain. We did not receive any previous records from [facility] where she had most of her testing done. Patient was seen and evaluated at [facility] he has had diagnostic exam.  She was told that she does have some degenerative disc disease in might have had some slipped disc. No routine follow-up was done. She describes the pain is constant achy type of pain on her mid lower abdomen but sometimes radiates to the right or left side. Patient does not recall if there were any type of incident or accident prior to having these issues. Tylenol. Not helping. Ibuprofen is also not helping very well. But since patient is on Plavix we will go ahead and trial her on some baclofen, and Flector patch. Patient's insurance did not cover lidocaine patch. Patient is also referred to take some muscle relaxant since she works a lot. Patient was told that she does have bilateral knee arthritis most recently left knee x-ray was done provide some mild osteoarthritis diagnosis.   Patient did not follow-up or have not gone to the specialist who saw her before. We will continue to discuss this in future visits. BILATERAL KNEE ARTHRITIS      FINDINGS:   Osseous structures of the knee are intact and align normally. Mild   tricompartment joint space narrowing and marginal osteophytosis. No   osteochondral defect is evident. No retained radiopaque foreign body. No   evidence of joint effusion. Impression   Mild osteoarthritis left knee. Otherwise unremarkable radiographs left knee. Follow-up imaging recommended if pain persists or worsens following   conservative management. ALZHEIMERS  Patient's mother was diagnosed with Alzheimer's. Patient is concerned about this and wanted to get some genetic testing done. We will continue to discuss this in future visits. WEIGHT  Patient's BMI is Body mass index is 34.11 kg/m². kg/m2. BMI is decreasing. Patient understands that this condition increases the patient's risk for chronic conditions. Wt Readings from Last 3 Encounters:   10/22/21 205 lb (93 kg)   07/19/21 215 lb 12.8 oz (97.9 kg)   07/12/21 216 lb (98 kg)     VITAL SIGNS:  Vitals:    10/22/21 0951   BP: 130/80   Pulse: 67   Temp: 97.1 °F (36.2 °C)   SpO2: 98%   Weight: 205 lb (93 kg)   Height: 5' 5\" (1.651 m)   Estimated body mass index is 34.11 kg/m² as calculated from the following:    Height as of this encounter: 5' 5\" (1.651 m). Weight as of this encounter: 205 lb (93 kg). Review of Systems   Constitutional: Negative for chills, fatigue and fever. Respiratory: Negative for shortness of breath. Cardiovascular: Negative for chest pain. Gastrointestinal: Negative for abdominal pain. Endocrine: Negative for polydipsia, polyphagia and polyuria. Genitourinary: Negative for dysuria. Neurological: Negative for headaches. Psychiatric/Behavioral: Positive for dysphoric mood. Negative for sleep disturbance and suicidal ideas. The patient is nervous/anxious. Physical Exam  Vitals and nursing note reviewed. Constitutional:       Appearance: Normal appearance. She is obese. HENT:      Head: Normocephalic. Nose: Nose normal.   Cardiovascular:      Rate and Rhythm: Normal rate and regular rhythm. Pulmonary:      Effort: Pulmonary effort is normal.      Breath sounds: Normal breath sounds. Abdominal:      General: Abdomen is protuberant. Comments: obese   Skin:     General: Skin is warm and dry. Neurological:      Mental Status: She is alert and oriented to person, place, and time. Psychiatric:         Mood and Affect: Mood is anxious. Speech: Speech is rapid and pressured. Behavior: Behavior is withdrawn. Thought Content: Thought content does not include homicidal or suicidal ideation. MEDICAL HISTORY      Diagnosis Date    Arthritis     Chronic midline low back pain without sciatica 7/12/2021    Coronary artery disease of native artery of native heart with stable angina pectoris ([facility] [address] 7/12/2021    Gastroesophageal reflux disease without esophagitis 5/19/2017    H/O vaginal hysterectomy 2006    Mild chronic obstructive pulmonary disease ([facility] 10/22/2021    Mixed hyperlipidemia 7/12/2021    Tobacco abuse     Type 2 diabetes mellitus with hyperglycemia, without long-term current use of insulin ([facility] [address] [facility] 7/19/2021    Vision abnormalities     glasses for reading      MEDICATIONS  Prior to Visit Medications    Medication Sig Taking?  Authorizing Provider   metFORMIN (GLUCOPHAGE-XR) 500 MG extended release tablet Take 1 tablet by mouth daily (with breakfast) Yes ZAK Barker CNP   baclofen (LIORESAL) 10 MG tablet Take 1 tablet by mouth 3 times daily Yes ZAK Barker CNP   Mometasone Furo-Formoterol Fum 50-5 MCG/ACT AERO Inhale 2 puffs into the lungs 2 times daily Yes ZAK Barker CNP   diclofenac (FLECTOR) 1.3 % PTCH patch Place 1 patch onto the skin 2 times daily Yes Gerald LOPES ZAK Shepherd CNP   clopidogrel (PLAVIX) 75 MG tablet Take 75 mg by mouth daily Yes Historical Provider, MD   Blood Glucose Monitoring Suppl (BLOOD GLUCOSE MONITOR SYSTEM) w/Device KIT Use as directed. Check glucose levels FASTING DAILY and as needed Yes Maria Luisa Castro MD   blood glucose monitor strips Test FASTING DAILY . BRAND OF CHOICE INSURANCE ALLOWS. Yes Maria Luisa Castro MD   Alcohol Swabs (ALCOHOL PREP) PADS Use as directed Yes ZAK Barker CNP   Lancets MISC 1 each by Does not apply route 2 times daily Yes ZAK Barker CNP   aspirin 81 MG EC tablet Take 1 tablet by mouth daily Yes Ann PullingZAK CNP   atorvastatin (LIPITOR) 40 MG tablet Take 1 tablet by mouth nightly Yes Ann Pulling, APRN - CNP   metoprolol tartrate (LOPRESSOR) 25 MG tablet Take 0.5 tablets by mouth 2 times daily Yes Delora Pulling, APRN - CNP   albuterol sulfate  (90 Base) MCG/ACT inhaler Inhale 2 puffs into the lungs every 6 hours as needed for Wheezing or Shortness of Breath  ZAK Barker CNP   acetaminophen (TYLENOL) 500 MG tablet Take 1 tablet by mouth every 6 hours as needed for Pain (Take 1-2 tablets every 6 hours)  ZAK Barker CNP       ASSESSMENT/PLAN:  1. Coronary artery disease of native artery of native heart with stable angina pectoris ([facility] [address]  Stable  DISCUSSED and ADVISED TO:  Discussed serious causes of CP. Advised to go to the ER for worsening CP/SOB         - clopidogrel (PLAVIX) 75 MG tablet; Take 75 mg by mouth daily    2. Type 2 diabetes mellitus with hyperglycemia, without long-term current use of insulin (HCC)  Stable  Continue therapy. We will continue to monitor Hemoglobin A1c   DISCUSSED and ADVISED TO:  Continue to check Glucose levels at home. Report increased and low levels as discussed. Decrease carbohydrates, sugary drinks, desserts in your diet. Exercise regularly, as tolerated. Try to lose weight.       - Microalbumin, Ur; Future  - POCT glycosylated hemoglobin (Hb A1C); Future  - POCT glycosylated hemoglobin (Hb A1C)  - metFORMIN (GLUCOPHAGE-XR) 500 MG extended release tablet; Take 1 tablet by mouth daily (with breakfast)  Dispense: 90 tablet; Refill: 1    3. Mild chronic obstructive pulmonary disease (HCC)  Failure to Improve  Continue albuterol  Start [facility]  Current treatment plan is effective, no change in therapy. Reviewed use, techniques, schedule and side effects of all inhaled medications  Critical need for compliance with treatment plan to achieve optimal results  Very strongly urged to quit smoking to reduce pulmonary and CVD risk. - Mometasone Furo-Formoterol Fum 50-5 MCG/ACT AERO; Inhale 2 puffs into the lungs 2 times daily  Dispense: 1 each; Refill: 1    4. Mixed hyperlipidemia  Stable  Continue therapy. Advised to decrease the consumption of red meats, fried foods, trans fats, sweets, sugary beverages. Advised to increase fish, vegetables, and fruits consumption. Advised to add fiber or OTC supplements in diet. Discussed weight loss which will result in improvement of lipids levels. Advised to increase daily physical activities and add regular exercises. 5. Chronic midline low back pain without sciatica  Worsening  Continue current therapy. DISCUSSED AND ADVISED TO:  Use heat packs 15 to 20 mins every 2-3 hours. Do some back stretches as tolerated. Refer to hand out for instructions. Call for worsening, numbness, weakness.      - XR LUMBAR SPINE (2-3 VIEWS); Future  - ketorolac (TORADOL) injection 60 mg  - baclofen (LIORESAL) 10 MG tablet; Take 1 tablet by mouth 3 times daily  Dispense: 90 tablet; Refill: 2  - diclofenac (FLECTOR) 1.3 % PTCH patch; Place 1 patch onto the skin 2 times daily  Dispense: 60 patch; Refill: 1    6. Arthritis of both knees  Failure to Improve  Continue to evaluate      7.  Severe obesity with body mass index (BMI) of 35.0 to 35.9 and comorbidity ([address]  BMI decreasing  DISCUSSED AND ADVISED TO:  Eat a low-fat and low carbohydrates diet. Avoid fried foods especially fast food. Choose healthier options for snacks. Have 5-6 servings of fruits and vegetables per day. Cut down on eating processed food. Add 30 minutes to 1 hour aerobic exercise for 3-4 days a week. 8. Family history of Alzheimer's disease  Historical  Continue to monitor    9. Screening for lung cancer  Recommended    - CT LUNG SCREENING; Future    10. Need for pneumococcal vaccination  Recommended by CDC. No current infection. Denies previous adverse reaction to vaccination.      - Pneumococcal polysaccharide vaccine 23-valent greater than or equal to 1yo subcutaneous/IM    11. Influenza vaccination declined  Declined, despite lengthy discussion of the importance of getting vaccinated. Total Time: minutes: 21-30 minutes          Return in about 4 months (around 2/22/2022) for Chronic conditions, 30mins. This note was completed by using the assistance of a speech-recognition program. However, inadvertent computerized transcription errors may be present. Although every effort was made to ensure accuracy, no guarantees can be provided that every mistake has been identified and corrected by editing.   Electronically signed by ZAK Zuleta CNP on 7/18/21 at 7:41 PM EDT     --ZAK Zuleta CNP

## 2021-10-22 ENCOUNTER — OFFICE VISIT (OUTPATIENT)
Dept: FAMILY MEDICINE CLINIC | Age: 56
End: 2021-10-22
Payer: COMMERCIAL

## 2021-10-22 VITALS
DIASTOLIC BLOOD PRESSURE: 80 MMHG | SYSTOLIC BLOOD PRESSURE: 130 MMHG | OXYGEN SATURATION: 98 % | BODY MASS INDEX: 34.16 KG/M2 | TEMPERATURE: 97.1 F | HEART RATE: 67 BPM | HEIGHT: 65 IN | WEIGHT: 205 LBS

## 2021-10-22 DIAGNOSIS — J44.9 MILD CHRONIC OBSTRUCTIVE PULMONARY DISEASE (HCC): ICD-10-CM

## 2021-10-22 DIAGNOSIS — M17.0 ARTHRITIS OF BOTH KNEES: ICD-10-CM

## 2021-10-22 DIAGNOSIS — Z23 NEED FOR PNEUMOCOCCAL VACCINATION: ICD-10-CM

## 2021-10-22 DIAGNOSIS — G89.29 CHRONIC MIDLINE LOW BACK PAIN WITHOUT SCIATICA: ICD-10-CM

## 2021-10-22 DIAGNOSIS — Z82.0 FAMILY HISTORY OF ALZHEIMER'S DISEASE: ICD-10-CM

## 2021-10-22 DIAGNOSIS — M54.50 CHRONIC MIDLINE LOW BACK PAIN WITHOUT SCIATICA: ICD-10-CM

## 2021-10-22 DIAGNOSIS — I25.118 CORONARY ARTERY DISEASE OF NATIVE ARTERY OF NATIVE HEART WITH STABLE ANGINA PECTORIS (HCC): Primary | ICD-10-CM

## 2021-10-22 DIAGNOSIS — Z12.2 SCREENING FOR LUNG CANCER: ICD-10-CM

## 2021-10-22 DIAGNOSIS — Z28.21 INFLUENZA VACCINATION DECLINED: ICD-10-CM

## 2021-10-22 DIAGNOSIS — E66.01 SEVERE OBESITY WITH BODY MASS INDEX (BMI) OF 35.0 TO 35.9 AND COMORBIDITY (HCC): ICD-10-CM

## 2021-10-22 DIAGNOSIS — E11.65 TYPE 2 DIABETES MELLITUS WITH HYPERGLYCEMIA, WITHOUT LONG-TERM CURRENT USE OF INSULIN (HCC): ICD-10-CM

## 2021-10-22 DIAGNOSIS — E78.2 MIXED HYPERLIPIDEMIA: ICD-10-CM

## 2021-10-22 LAB — HBA1C MFR BLD: 6 %

## 2021-10-22 PROCEDURE — 3023F SPIROM DOC REV: CPT | Performed by: FAMILY MEDICINE

## 2021-10-22 PROCEDURE — 2022F DILAT RTA XM EVC RTNOPTHY: CPT | Performed by: FAMILY MEDICINE

## 2021-10-22 PROCEDURE — 3017F COLORECTAL CA SCREEN DOC REV: CPT | Performed by: FAMILY MEDICINE

## 2021-10-22 PROCEDURE — 99214 OFFICE O/P EST MOD 30 MIN: CPT | Performed by: FAMILY MEDICINE

## 2021-10-22 PROCEDURE — G8427 DOCREV CUR MEDS BY ELIG CLIN: HCPCS | Performed by: FAMILY MEDICINE

## 2021-10-22 PROCEDURE — 90732 PPSV23 VACC 2 YRS+ SUBQ/IM: CPT | Performed by: FAMILY MEDICINE

## 2021-10-22 PROCEDURE — G8484 FLU IMMUNIZE NO ADMIN: HCPCS | Performed by: FAMILY MEDICINE

## 2021-10-22 PROCEDURE — 96372 THER/PROPH/DIAG INJ SC/IM: CPT | Performed by: FAMILY MEDICINE

## 2021-10-22 PROCEDURE — G8417 CALC BMI ABV UP PARAM F/U: HCPCS | Performed by: FAMILY MEDICINE

## 2021-10-22 PROCEDURE — 3044F HG A1C LEVEL LT 7.0%: CPT | Performed by: FAMILY MEDICINE

## 2021-10-22 PROCEDURE — 90471 IMMUNIZATION ADMIN: CPT | Performed by: FAMILY MEDICINE

## 2021-10-22 PROCEDURE — G8926 SPIRO NO PERF OR DOC: HCPCS | Performed by: FAMILY MEDICINE

## 2021-10-22 PROCEDURE — 83036 HEMOGLOBIN GLYCOSYLATED A1C: CPT | Performed by: FAMILY MEDICINE

## 2021-10-22 PROCEDURE — 4004F PT TOBACCO SCREEN RCVD TLK: CPT | Performed by: FAMILY MEDICINE

## 2021-10-22 RX ORDER — METFORMIN HYDROCHLORIDE 500 MG/1
500 TABLET, EXTENDED RELEASE ORAL
Qty: 90 TABLET | Refills: 1 | Status: SHIPPED | OUTPATIENT
Start: 2021-10-22 | End: 2022-05-23 | Stop reason: SDUPTHER

## 2021-10-22 RX ORDER — CLOPIDOGREL BISULFATE 75 MG/1
75 TABLET ORAL DAILY
COMMUNITY

## 2021-10-22 RX ORDER — BACLOFEN 10 MG/1
10 TABLET ORAL 3 TIMES DAILY
Qty: 90 TABLET | Refills: 2 | Status: SHIPPED | OUTPATIENT
Start: 2021-10-22 | End: 2021-11-21

## 2021-10-22 RX ORDER — DICLOFENAC EPOLAMINE 0.01 G/1
1 SYSTEM TOPICAL 2 TIMES DAILY
Qty: 60 PATCH | Refills: 1 | Status: SHIPPED | OUTPATIENT
Start: 2021-10-22

## 2021-10-22 RX ORDER — KETOROLAC TROMETHAMINE 30 MG/ML
60 INJECTION, SOLUTION INTRAMUSCULAR; INTRAVENOUS ONCE
Status: COMPLETED | OUTPATIENT
Start: 2021-10-22 | End: 2021-10-22

## 2021-10-22 RX ADMIN — KETOROLAC TROMETHAMINE 60 MG: 30 INJECTION, SOLUTION INTRAMUSCULAR; INTRAVENOUS at 11:07

## 2021-10-22 NOTE — PATIENT INSTRUCTIONS
Patient Education        Learning About Carbohydrate (Carb) Counting and Eating Out When You Have Diabetes  Why plan your meals? Meal planning can be a key part of managing diabetes. Planning meals and snacks with the right balance of carbohydrate, protein, and fat can help you keep your blood sugar at the target level you set with your doctor. You don't have to eat special foods. You can eat what your family eats, including sweets once in a while. But you do have to pay attention to how often you eat and how much you eat of certain foods. You may want to work with a dietitian or a certified diabetes educator. He or she can give you tips and meal ideas and can answer your questions about meal planning. This health professional can also help you reach a healthy weight if that is one of your goals. What should you know about eating carbs? Managing the amount of carbohydrate (carbs) you eat is an important part of healthy meals when you have diabetes. Carbohydrate is found in many foods. · Learn which foods have carbs. And learn the amounts of carbs in different foods. ? Bread, cereal, pasta, and rice have about 15 grams of carbs in a serving. A serving is 1 slice of bread (1 ounce), ½ cup of cooked cereal, or 1/3 cup of cooked pasta or rice. ? Fruits have 15 grams of carbs in a serving. A serving is 1 small fresh fruit, such as an apple or orange; ½ of a banana; ½ cup of cooked or canned fruit; ½ cup of fruit juice; 1 cup of melon or raspberries; or 2 tablespoons of dried fruit. ? Milk and no-sugar-added yogurt have 15 grams of carbs in a serving. A serving is 1 cup of milk or 2/3 cup of no-sugar-added yogurt. ? Starchy vegetables have 15 grams of carbs in a serving. A serving is ½ cup of mashed potatoes or sweet potato; 1 cup winter squash; ½ of a small baked potato; ½ cup of cooked beans; or ½ cup cooked corn or green peas.   · Learn how much carbs to eat each day and at each meal. A dietitian or CDE can teach you how to keep track of the amount of carbs you eat. This is called carbohydrate counting. · If you are not sure how to count carbohydrate grams, use the Plate Method to plan meals. It is a good, quick way to make sure that you have a balanced meal. It also helps you spread carbs throughout the day. ? Divide your plate by types of foods. Put non-starchy vegetables on half the plate, meat or other protein food on one-quarter of the plate, and a grain or starchy vegetable in the final quarter of the plate. To this you can add a small piece of fruit and 1 cup of milk or yogurt, depending on how many carbs you are supposed to eat at a meal.  · Try to eat about the same amount of carbs at each meal. Do not \"save up\" your daily allowance of carbs to eat at one meal.  · Proteins have very little or no carbs per serving. Examples of proteins are beef, chicken, turkey, fish, eggs, tofu, cheese, cottage cheese, and peanut butter. A serving size of meat is 3 ounces, which is about the size of a deck of cards. Examples of meat substitute serving sizes (equal to 1 ounce of meat) are 1/4 cup of cottage cheese, 1 egg, 1 tablespoon of peanut butter, and ½ cup of tofu. How can you eat out and still eat healthy? · Learn to estimate the serving sizes of foods that have carbohydrate. If you measure food at home, it will be easier to estimate the amount in a serving of restaurant food. · If the meal you order has too much carbohydrate (such as potatoes, corn, or baked beans), ask to have a low-carbohydrate food instead. Ask for a salad or green vegetables. · If you use insulin, check your blood sugar before and after eating out to help you plan how much to eat in the future. · If you eat more carbohydrate at a meal than you had planned, take a walk or do other exercise. This will help lower your blood sugar. What are some tips for eating healthy? · Limit saturated fat, such as the fat from meat and dairy products.  This is a healthy choice because people who have diabetes are at higher risk of heart disease. So choose lean cuts of meat and nonfat or low-fat dairy products. Use olive or canola oil instead of butter or shortening when cooking. · Don't skip meals. Your blood sugar may drop too low if you skip meals and take insulin or certain medicines for diabetes. · Check with your doctor before you drink alcohol. Alcohol can cause your blood sugar to drop too low. Alcohol can also cause a bad reaction if you take certain diabetes medicines. Follow-up care is a key part of your treatment and safety. Be sure to make and go to all appointments, and call your doctor if you are having problems. It's also a good idea to know your test results and keep a list of the medicines you take. Where can you learn more? Go to https://[employer] Aut[employer]pesukieb.IguanaFix. org and sign in to your [employer] account. Enter L865 in the [employer] box to learn more about \"Learning About Carbohydrate (Carb) Counting and Eating Out When You Have Diabetes. \"     If you do not have an account, please click on the \"Sign Up Now\" link. Current as of: December 17, 2020               Content Version: 13.0  © 4337-0778 Healthwise, Incorporated. Care instructions adapted under license by [facility] ([facility]). If you have questions about a medical condition or this instruction, always ask your healthcare professional. [place] [address] any warranty or liability for your use of this information.

## 2021-10-25 ENCOUNTER — TELEPHONE (OUTPATIENT)
Dept: ONCOLOGY | Age: 56
End: 2021-10-25

## 2021-10-25 NOTE — LETTER
10/25/2021        [address] [place] [address]    Dear Ana Maria Walters: Your healthcare provider has ordered a low dose CT scan of the chest for lung cancer screening. You will find enclosed, information about CT lung screening. Please review the statement of understanding, you will be asked to sign a copy of this at the time of your CT scan    If you have not already been contacted to make the appointment for your scan, please call our scheduling department at [phone]    Keep in mind that CT lung screening does not take the place of smoking cessation. If you are a current smoker, you will find enclosed smoking cessation resources. Please do not hesitate to contact me if you have any questions or concerns.     [address],      [address] [facility] Scre[facility]  [phone]

## 2021-10-29 DIAGNOSIS — J44.9 MILD CHRONIC OBSTRUCTIVE PULMONARY DISEASE (HCC): Primary | ICD-10-CM

## 2021-11-01 ENCOUNTER — HOSPITAL ENCOUNTER (OUTPATIENT)
Dept: GENERAL RADIOLOGY | Age: 56
Discharge: HOME | End: 2021-11-03
Payer: COMMERCIAL

## 2021-11-01 ENCOUNTER — HOSPITAL ENCOUNTER (OUTPATIENT)
Dept: CT IMAGING | Age: 56
Discharge: HOME | End: 2021-11-03
Payer: COMMERCIAL

## 2021-11-01 ENCOUNTER — HOSPITAL ENCOUNTER (OUTPATIENT)
Age: 56
Discharge: HOME | End: 2021-11-01
Payer: COMMERCIAL

## 2021-11-01 ENCOUNTER — HOSPITAL ENCOUNTER (OUTPATIENT)
Age: 56
Discharge: HOME | End: 2021-11-03
Payer: COMMERCIAL

## 2021-11-01 DIAGNOSIS — G89.29 CHRONIC MIDLINE LOW BACK PAIN WITHOUT SCIATICA: ICD-10-CM

## 2021-11-01 DIAGNOSIS — E11.65 TYPE 2 DIABETES MELLITUS WITH HYPERGLYCEMIA, WITHOUT LONG-TERM CURRENT USE OF INSULIN (HCC): ICD-10-CM

## 2021-11-01 DIAGNOSIS — M54.50 CHRONIC MIDLINE LOW BACK PAIN WITHOUT SCIATICA: ICD-10-CM

## 2021-11-01 DIAGNOSIS — Z12.2 SCREENING FOR LUNG CANCER: ICD-10-CM

## 2021-11-01 LAB
CREATININE URINE: 146.4 MG/DL (ref 28–217)
MICROALBUMIN/CREAT 24H UR: 13 MG/L
MICROALBUMIN/CREAT UR-RTO: 9 MCG/MG CREAT

## 2021-11-01 PROCEDURE — 82570 ASSAY OF URINE CREATININE: CPT

## 2021-11-01 PROCEDURE — 82043 UR ALBUMIN QUANTITATIVE: CPT

## 2021-11-01 PROCEDURE — 71271 CT THORAX LUNG CANCER SCR C-: CPT

## 2021-11-01 PROCEDURE — 72100 X-RAY EXAM L-S SPINE 2/3 VWS: CPT

## 2021-11-02 ENCOUNTER — TELEPHONE (OUTPATIENT)
Dept: FAMILY MEDICINE CLINIC | Age: 56
End: 2021-11-02

## 2021-11-02 DIAGNOSIS — F17.200 SMOKES AND MOTIVATED TO QUIT: Primary | ICD-10-CM

## 2021-11-02 RX ORDER — VARENICLINE TARTRATE 0.5 MG/1
.5-1 TABLET, FILM COATED ORAL SEE ADMIN INSTRUCTIONS
Qty: 57 TABLET | Refills: 0 | Status: SHIPPED | OUTPATIENT
Start: 2021-11-02 | End: 2022-02-25

## 2021-11-02 NOTE — TELEPHONE ENCOUNTER
Pt called and stated that she will not be able to do the PT as is it too expensive per session and she was also stating that she would like some chantix called into her pharmacy if at all possible

## 2021-11-13 ENCOUNTER — APPOINTMENT (OUTPATIENT)
Dept: GENERAL RADIOLOGY | Age: 56
End: 2021-11-13
Payer: COMMERCIAL

## 2021-11-13 ENCOUNTER — HOSPITAL ENCOUNTER (EMERGENCY)
Age: 56
Discharge: HOME | End: 2021-11-13
Attending: EMERGENCY MEDICINE
Payer: COMMERCIAL

## 2021-11-13 VITALS
TEMPERATURE: 98.3 F | HEART RATE: 67 BPM | RESPIRATION RATE: 16 BRPM | OXYGEN SATURATION: 97 % | SYSTOLIC BLOOD PRESSURE: 138 MMHG | DIASTOLIC BLOOD PRESSURE: 82 MMHG

## 2021-11-13 DIAGNOSIS — S39.012A BACK STRAIN, INITIAL ENCOUNTER: Primary | ICD-10-CM

## 2021-11-13 PROCEDURE — 96372 THER/PROPH/DIAG INJ SC/IM: CPT

## 2021-11-13 PROCEDURE — 72072 X-RAY EXAM THORAC SPINE 3VWS: CPT

## 2021-11-13 PROCEDURE — 71046 X-RAY EXAM CHEST 2 VIEWS: CPT

## 2021-11-13 PROCEDURE — 99283 EMERGENCY DEPT VISIT LOW MDM: CPT

## 2021-11-13 PROCEDURE — 6360000002 HC RX W HCPCS: Performed by: STUDENT IN AN ORGANIZED HEALTH CARE EDUCATION/TRAINING PROGRAM

## 2021-11-13 PROCEDURE — 6370000000 HC RX 637 (ALT 250 FOR IP): Performed by: STUDENT IN AN ORGANIZED HEALTH CARE EDUCATION/TRAINING PROGRAM

## 2021-11-13 RX ORDER — CYCLOBENZAPRINE HCL 5 MG
5 TABLET ORAL 2 TIMES DAILY PRN
Qty: 10 TABLET | Refills: 0 | Status: SHIPPED | OUTPATIENT
Start: 2021-11-13 | End: 2021-11-23

## 2021-11-13 RX ORDER — ACETAMINOPHEN 500 MG
1000 TABLET ORAL ONCE
Status: COMPLETED | OUTPATIENT
Start: 2021-11-13 | End: 2021-11-13

## 2021-11-13 RX ORDER — HYDROCODONE BITARTRATE AND ACETAMINOPHEN 5; 325 MG/1; MG/1
1 TABLET ORAL EVERY 8 HOURS PRN
Qty: 5 TABLET | Refills: 0 | Status: SHIPPED | OUTPATIENT
Start: 2021-11-13 | End: 2021-11-16

## 2021-11-13 RX ORDER — ORPHENADRINE CITRATE 30 MG/ML
60 INJECTION INTRAMUSCULAR; INTRAVENOUS ONCE
Status: COMPLETED | OUTPATIENT
Start: 2021-11-13 | End: 2021-11-13

## 2021-11-13 RX ORDER — LIDOCAINE 4 G/G
2 PATCH TOPICAL DAILY
Status: DISCONTINUED | OUTPATIENT
Start: 2021-11-13 | End: 2021-11-13 | Stop reason: HOSPADM

## 2021-11-13 RX ORDER — IBUPROFEN 400 MG/1
400 TABLET ORAL ONCE
Status: COMPLETED | OUTPATIENT
Start: 2021-11-13 | End: 2021-11-13

## 2021-11-13 RX ADMIN — IBUPROFEN 400 MG: 400 TABLET, FILM COATED ORAL at 13:48

## 2021-11-13 RX ADMIN — ORPHENADRINE CITRATE 60 MG: 30 INJECTION INTRAMUSCULAR; INTRAVENOUS at 13:49

## 2021-11-13 RX ADMIN — ACETAMINOPHEN 1000 MG: 500 TABLET ORAL at 13:48

## 2021-11-13 ASSESSMENT — ENCOUNTER SYMPTOMS
NAUSEA: 0
SORE THROAT: 0
SHORTNESS OF BREATH: 0
SINUS PAIN: 0
COUGH: 0
DIARRHEA: 0
ABDOMINAL PAIN: 0
BACK PAIN: 1
EYE PAIN: 0
VOMITING: 0

## 2021-11-13 ASSESSMENT — PAIN DESCRIPTION - PAIN TYPE: TYPE: ACUTE PAIN

## 2021-11-13 ASSESSMENT — PAIN SCALES - GENERAL
PAINLEVEL_OUTOF10: 10
PAINLEVEL_OUTOF10: 9

## 2021-11-13 NOTE — ED PROVIDER NOTES
[facility]     Emergency Department     Faculty Attestation    I performed a history and physical examination of the patient and discussed management with the resident. I reviewed the resident´s note and agree with the documented findings and plan of care. Any areas of disagreement are noted on the chart. I was personally present for the key portions of any procedures. I have documented in the chart those procedures where I was not present during the key portions. I have reviewed the emergency nurses triage note. I agree with the chief complaint, past medical history, past surgical history, allergies, medications, social and family history as documented unless otherwise noted below. For Physician Assistant/ Nurse Practitioner cases/documentation I have personally evaluated this patient and have completed at least one if not all key elements of the E/M (history, physical exam, and MDM). Additional findings are as noted. History of chronic low back pain, patient now has pain over the midline T6 area and para thoracic muscle pain. Patient states it hurts when she takes a deep breath, patient is not tachycardic or hypoxic, equal breath sounds, heart tones normal, patient reproduced by palpation of the upper thoracic and parathoracic musculature. No lower extremity pain or swelling on examination. Patient states she has chronic arthritis in both knees.      Haile Mtz MD  11/13/21 1766

## 2021-11-13 NOTE — ED PROVIDER NOTES
[facility]  ED  Emergency Department Encounter  EmergencyMedicineResident     This patient was seen during the COVID-19 crisis. There were limited resources and those resources we did have had to be conserved for the sickest of patients. Pt Name: Miguel Fox  MRN: [id number]  [place] 1965  Date of evaluation: 21  PCP: ZAK Chase CNP    CHIEF COMPLAINT       Chief Complaint   Patient presents with    Back Pain    Knee Pain       HISTORY OF PRESENT ILLNESS  (Location/Symptom, Timing/Onset, Context/Setting, Quality, Duration, Modifying Factors, Severity.)      Miguel Fox is a 61 y.o. female who presents for evaluation of back pain. Patient states been on for the last 3 days. She states she works as a , she denies any injury or trauma. She reports that she does have chronic low back pain but this is new upper back pain. She has been taking OTC Tylenol for pain control with out any relief. She said she was sent in by her work as the pain and discomfort was getting noticeable to coworkers. She denies any pain with inspiration or any neurologic deficits. PAST MEDICAL / SURGICAL /SOCIAL / FAMILY HISTORY      has a past medical history of Arthritis, Chronic midline low back pain without sciatica, Coronary artery disease of native artery of native heart with stable angina pectoris ([address] Gastroesophageal reflux disease without esophagitis, H/O vaginal hysterectomy, Mild chronic obstructive pulmonary disease ([address] Mixed hyperlipidemia, Tobacco abuse, Type 2 diabetes mellitus with hyperglycemia, without long-term current use of insulin ([address] and Vision abnormalities. has a past surgical history that includes Tonsillectomy;  section; Hysterectomy; hernia repair (Right); Breast surgery (Left); Colonoscopy (8/27/15);  Breast biopsy; and Ovary removal.      Social History     Socioeconomic History    Marital status:      Spouse name: Not on file    Number of children: Not on file    Years of education: Not on file    Highest education level: Not on file   Occupational History    Not on file   Tobacco Use    Smoking status: Current Some Day Smoker     Packs/day: 1.00     Years: 38.00     Pack years: 38.00     Types: Cigarettes     Start date: 17    Smokeless tobacco: Never Used   Substance and Sexual Activity    Alcohol use: Yes     Alcohol/week: 0.0 standard drinks     Comment: OCC    Drug use: No    Sexual activity: Yes     Partners: Male     Birth control/protection: Surgical     Comment: hyst   Other Topics Concern    Not on file   Social History Narrative    Not on file     Social Determinants of Health     Financial Resource Strain: High Risk    Difficulty of Paying Living Expenses: Hard   Food Insecurity: Food Insecurity Present    Worried About Running Out of Food in the Last Year: Sometimes true    Jocelyne of Food in the Last Year: Never true   Transportation Needs:     Lack of Transportation (Medical): Not on file    Lack of Transportation (Non-Medical):  Not on file   Physical Activity:     Days of Exercise per Week: Not on file    Minutes of Exercise per Session: Not on file   Stress:     Feeling of Stress : Not on file   Social Connections:     Frequency of Communication with Friends and Family: Not on file    Frequency of Social Gatherings with Friends and Family: Not on file    Attends Judaism Services: Not on file    Active Member of Clubs or Organizations: Not on file    Attends Club or Organization Meetings: Not on file    Marital Status: Not on file   Intimate Partner Violence:     Fear of Current or Ex-Partner: Not on file    Emotionally Abused: Not on file    Physically Abused: Not on file    Sexually Abused: Not on file   Housing Stability:     Unable to Pay for Housing in the Last Year: Not on file    Number of [place] in the Last Year: Not on file    Unstable Housing in the Last Year: Not on file       Family History   Problem Relation Age of Onset    Stroke Mother         3x    Hypertension Brother     High Cholesterol Brother        Allergies:  Patient has no known allergies. Home Medications:  Prior to Admission medications    Medication Sig Start Date End Date Taking? Authorizing Provider   cyclobenzaprine (FLEXERIL) 5 MG tablet Take 1 tablet by mouth 2 times daily as needed for Muscle spasms 11/13/21 11/23/21 Yes Maurisio Rosas MD   HYDROcodone-acetaminophen (NORCO) 5-325 MG per tablet Take 1 tablet by mouth every 8 hours as needed for Pain for up to 3 days. Intended supply: 3 days. Take lowest dose possible to manage pain 11/13/21 11/16/21 Yes Maurisio Rosas MD   varenicline (CHANTIX) 0.5 MG tablet Take 1-2 tablets by mouth See Admin Instructions 0.5mg DAILY for 3 days followed by 0.5mg TWICE DAILY for 4 days followed by 1mg TWICE DAILY 11/2/21   ZAK Barker CNP   fluticasone-salmeterol (ADVAIR) 100-50 MCG/DOSE diskus inhaler Inhale 1 puff into the lungs every 12 hours 10/29/21   ZAK Barker CNP   metFORMIN (GLUCOPHAGE-XR) 500 MG extended release tablet Take 1 tablet by mouth daily (with breakfast) 10/22/21   ZAK Barker CNP   baclofen (LIORESAL) 10 MG tablet Take 1 tablet by mouth 3 times daily 10/22/21 11/21/21  ZAK Barker CNP   diclofenac (FLECTOR) 1.3 % PTCH patch Place 1 patch onto the skin 2 times daily 10/22/21   ZAK Barker CNP   clopidogrel (PLAVIX) 75 MG tablet Take 75 mg by mouth daily    Historical Provider, MD   albuterol sulfate  (90 Base) MCG/ACT inhaler Inhale 2 puffs into the lungs every 6 hours as needed for Wheezing or Shortness of Breath 9/2/21 10/2/21  ZAK Barker CNP   Blood Glucose Monitoring Suppl (BLOOD GLUCOSE MONITOR SYSTEM) w/Device KIT Use as directed.  Check glucose levels FASTING DAILY and as needed 7/26/21   Barerra Colon MD   blood glucose monitor strips Test FASTING DAILY . BRAND OF CHOICE INSURANCE ALLOWS. 7/26/21   Cherri Spurling, MD   Alcohol Swabs (ALCOHOL PREP) PADS Use as directed 7/14/21   ZAK Barker CNP   Lancets MISC 1 each by Does not apply route 2 times daily 7/14/21   ZKA Barker CNP   acetaminophen (TYLENOL) 500 MG tablet Take 1 tablet by mouth every 6 hours as needed for Pain (Take 1-2 tablets every 6 hours) 7/12/21 8/11/21  ZAK Barker CNP   aspirin 81 MG EC tablet Take 1 tablet by mouth daily 7/3/21   ZAK Miller CNP   atorvastatin (LIPITOR) 40 MG tablet Take 1 tablet by mouth nightly 7/2/21   ZAK Miller CNP   metoprolol tartrate (LOPRESSOR) 25 MG tablet Take 0.5 tablets by mouth 2 times daily 7/2/21   ZAK Miller CNP       REVIEW OF SYSTEMS    (2-9 systems for level 4, 10 or more forlevel 5)      Review of Systems   Constitutional: Negative for activity change, chills and fever. HENT: Negative for congestion, sinus pain and sore throat. Eyes: Negative for pain and visual disturbance. Respiratory: Negative for cough and shortness of breath. Cardiovascular: Negative for chest pain. Gastrointestinal: Negative for abdominal pain, diarrhea, nausea and vomiting. Genitourinary: Negative for difficulty urinating, dysuria and hematuria. Musculoskeletal: Positive for back pain. Skin: Negative for rash and wound. Neurological: Negative for dizziness, light-headedness and headaches. Psychiatric/Behavioral: Negative for agitation and confusion. PHYSICAL EXAM   (up to 7 for level 4, 8 or more forlevel 5)      ED TRIAGE VITALS BP: 138/82, Temp: 98.3 °F (36.8 °C), Pulse: 67, Resp: 16, SpO2: 97 %    Vitals:    11/13/21 1227   BP: 138/82   Pulse: 67   Resp: 16   Temp: 98.3 °F (36.8 °C)   TempSrc: Oral   SpO2: 97%         Physical Exam  Vitals and nursing note reviewed. Constitutional:       Appearance: Normal appearance.    HENT: Head: Normocephalic and atraumatic. Nose: Nose normal.      Mouth/Throat:      Mouth: Mucous membranes are moist.   Eyes:      Extraocular Movements: Extraocular movements intact. Pupils: Pupils are equal, round, and reactive to light. Cardiovascular:      Rate and Rhythm: Normal rate and regular rhythm. Pulses: Normal pulses. Heart sounds: Normal heart sounds. Pulmonary:      Effort: Pulmonary effort is normal.      Breath sounds: Normal breath sounds. Abdominal:      General: Abdomen is flat. Palpations: Abdomen is soft. Musculoskeletal:      Cervical back: Normal range of motion. Comments: Tenderness to palpation throughout the paraspinal musculature of the thoracic spine. She does have minimal tenderness palpation around the level of T6-T7. No step-offs or deformity of the T-spine   Skin:     General: Skin is warm and dry. Capillary Refill: Capillary refill takes less than 2 seconds. Neurological:      General: No focal deficit present. Mental Status: She is alert and oriented to person, place, and time.    Psychiatric:         Mood and Affect: Mood normal.         Behavior: Behavior normal.           DIFFERENTIAL  DIAGNOSIS     PLAN (LABS / IMAGING / EKG):  Orders Placed This Encounter   Procedures    XR CHEST (2 VW)    XR THORACIC SPINE (3 VIEWS)       MEDICATIONS ORDERED:  ED Medication Orders (From admission, onward)    Start Ordered     Status Ordering Provider    11/13/21 1345 11/13/21 1331  orphenadrine (NORFLEX) injection 60 mg  ONCE         Last MAR action: Given - by Greta Mckeon on 11/13/21 at [facility]    11/13/21 1345 11/13/21 1331  lidocaine 4 % external patch 2 patch  DAILY         Last MAR action: Patch Applied - by Greta Mckeon on 11[place]13/21 [facility] [address]     11/13/21 1345 11/13/21 1331  acetaminophen (TYLENOL) tablet 1,000 mg  ONCE         Last MAR action: Given - by Greta Mckeon on 11/13/21 at [facility]    11/13/21 1345 11/13/21 1331  ibuprofen (ADVIL;MOTRIN) tablet 400 mg  ONCE         Last MAR action: Given - by Maxine Can on 11/13/21 at [address] [facility]          DDX: Thoracic spine strain, compression fracture, dislocated rib, pneumothorax, pneumonia, dissection    DIAGNOSTIC RESULTS / EMERGENCY DEPARTMENT COURSE / MDM     IMPRESSION & INITIAL PLAN:  58-year-old female presented emerged from today for evaluation of back pain. History of COPD. Pain is been present for the last 3 days. On exam there is tenderness palpation throughout the paraspinal musculature of the thoracic spine minimal midline tenderness. Concern for thoracic spine strain with back spasm versus acute dislocation/compression fracture of the thoracic spine. Chest x-ray negative for any acute processes such as pneumonia, mediastinum within normal limits. Thoracic spine negative for any osseous abnormalities. Patient treated with multimodal pain medications here in the emergency department with relief of pain at rest.    Plan to discharge patient home with pain control and close follow-up. Patient agreeable to plan of care. LABS:  No results found for this visit on 11/13/21. RADIOLOGY:  XR CHEST (2 VW)   Final Result   No acute findings involving the chest or thoracic spine. XR THORACIC SPINE (3 VIEWS)   Final Result   No acute findings involving the chest or thoracic spine. EMERGENCY DEPARTMENT COURSE:    ED Course as of 11/13/21 1428   Sat Nov 13, 2021   1300 Will treat pain with lidocaine, toradol, tylenol, baclofen. [ML]      ED Course User Index  [ML] Domingo Coy DO        CONSULTS:  None    CRITICAL CARE:  See attending physician note    FINAL IMPRESSION      1.  Back strain, initial encounter          DISPOSITION / PLAN     DISPOSITION Decision To Discharge 11/13/2021 02:15:08 PM      PATIENT REFERRED TO:  Eb Baez, ZAK - CNP  [address] [facility]  [address] [facility]    In 2 days      [facility]  [address]  [id number] [phone]    If symptoms worsen      DISCHARGE MEDICATIONS:  Discharge Medication List as of 11/13/2021  2:16 PM      START taking these medications    Details   cyclobenzaprine (FLEXERIL) 5 MG tablet Take 1 tablet by mouth 2 times daily as needed for Muscle spasms, Disp-10 tablet, R-0Print      HYDROcodone-acetaminophen (NORCO) 5-325 MG per tablet Take 1 tablet by mouth every 8 hours as needed for Pain for up to 3 days. Intended supply: 3 days.  Take lowest dose possible to manage pain, Disp-5 tablet, R-0Print           Discharge Medication List as of 11/13/2021  2:16 PM           Keri Norris MD  Emergency Medicine Resident    (Please note that portions of this note were completed with a voice recognition program.  Efforts were made to edit the dictations but occasionally words are mis-transcribed.)       Keri Norris MD  Resident  11/13/21 3504

## 2021-11-13 NOTE — Clinical Note
Jacqui Reyes was seen and treated in our emergency department on 11/13/2021. She may return to work on 11/22/2021. If you have any questions or concerns, please don't hesitate to call.       Debbie Zaragoza MD

## 2021-11-15 ENCOUNTER — TELEPHONE (OUTPATIENT)
Dept: FAMILY MEDICINE CLINIC | Age: 56
End: 2021-11-15

## 2021-11-15 NOTE — TELEPHONE ENCOUNTER
[facility]) ED Follow up Call     Reason for ED visit:  Back strain        11/15/2021     Kalpesh Reyes , this is kiera from Dr. Debbi Shepherd's office, just calling to see how you are doing after your recent ED visit. Did you receive discharge instructions? Yes  Do you understand the discharge instructions? Yes  Did the ED give you any new prescriptions? Yes  Were you able to fill your prescriptions? Yes      Do you have one of our red, yellow and green  Zone sheets that help you to determine when you should go to the ED? No      Do you need or want to make a follow up appt with your PCP? No    Do you have any further needs in the home, e.g. equipment? No        FU appts/Provider:    Future Appointments   Date Time Provider Chasity Nicholas   2/25/2022 11:30 AM ZAK Barker - CNP [facility]         .

## 2022-02-22 ASSESSMENT — ENCOUNTER SYMPTOMS
ABDOMINAL PAIN: 0
SHORTNESS OF BREATH: 0

## 2022-02-23 NOTE — PROGRESS NOTES
[address]  [facility] [address].  [address] [place] [id number]  Dept: 420 Michelle Robertson (:  1965) is a 61 y.o. female. Patient is here for evaluation of the following chief complaint(s):  Chief Complaint   Patient presents with    Diabetes    Hyperlipidemia    Back Pain     full back         SUBJECTIVE/OBJECTIVE:  MAURICE Marr is a 61 y.o. female patient. Patient is an established patient of mine. Patient has a known history of CAD, diabetes, COPD, hyperlipidemia, low back pain, obesity, GERD, smoker,  and bilateral knee arthritis     CAD  Patient has a cardiac stent placed 2021   Patient reported chest pain prior to hospitalization. Patient was also found with severe hypotension while in the hospital although asymptomatic. Patient was not taking medication previously. Patient is currently adherent with her therapy but is having some issues with the price of  Plavix. Patient had been recently seen by Carmine Baez in hospital.  still has SOB. March- scheduled, geoff have another chemical stress test,   Lab Results   Component Value Date    LVEF 59 2021     DIABETES MELLITUS  Patient has a  stable Diabetes Mellitus. Current therapy includes Metformin XR-. Patient is responding well with this therapy. Patient reports home glucose monitoring as Stable readings. Patient denieshypoglycemia episodes such as{symptoms. Patient denies neither vomiting nor diarrhea. Eye Exam: UP TO DATE     Foot Exam:due  Lab Results   Component Value Date/Time    LABA1C 6.2 2022 11:21 AM    LABA1C 6.0 10/22/2021 10:04 AM      COPD/ Mariah Perry has a known history of COPD. Patient is currently on Albuterol, will start Symbicort . Patient reports that she has been using the albuterol round-the-clock otherwise she will be having some shortness of breath. Therefore we will start her on some Dulera. Patient reports relief with this therapy.  Patient denies any adverse reaction to current therapy. Smoker- started Ramesh Marshall is currently on atorvastatin (Lipitor). Have started this medication prior to testing. Patient denies adverse reaction to this medication. Compliance with treatment thus far has been good. The patient is known to have coexisting coronary artery disease. The 10-year CVD risk score (Eusebio, et al., 2008) is: 16.8%    Values used to calculate the score:      Age: 61 years      Sex: Female      Diabetic: Yes      Tobacco smoker: Yes      Systolic Blood Pressure: 320 mmHg      Is BP treated: No      HDL Cholesterol: 34 mg/dL      Total Cholesterol: 184 mg/dL  Lab Results   Component Value Date/Time    CHOL 184 07/13/2021 08:50 AM    HDL 34 (L) 07/13/2021 08:50 AM    LDLCHOLESTEROL 121 07/13/2021 08:50 AM    CHOLHDLRATIO 5.4 (H) 07/13/2021 08:50 AM    TRIG 143 07/13/2021 08:50 AM    VLDL NOT REPORTED 07/13/2021 08:50 AM     LOW BACK PAIN  Patient also reported a chronic problem with low back pain. She arrived today with moderate to severe type of pain. We did not receive any previous records from [facility] where she had most of her testing done. Patient was seen and evaluated at [facility] he has had diagnostic exam.  She was told that she does have some degenerative disc disease in might have had some slipped disc. No routine follow-up was done. She describes the pain is constant achy type of pain on her mid lower abdomen but sometimes radiates to the right or left side. Patient does not recall if there were any type of incident or accident prior to having these issues. Tylenol. Not helping. Ibuprofen is also not helping very well. But since patient is on Plavix we will go ahead and trial her on some baclofen, gabapentin, and Flector patch. Patient's insurance did not cover lidocaine patch. Patient is also referred to take some muscle relaxant since she works a lot.   Patient was told that she does have bilateral knee arthritis most recently left knee x-ray was done provide some mild osteoarthritis diagnosis. Patient did not follow-up or have not gone to the specialist who saw her before. We will continue to discuss this in future visits. Lab Results   Component Value Date    CREATININE 0.82 06/29/2021     BILATERAL KNEE ARTHRITIS  Still has a lot of pain especially with standing for a long period of time. WIll send to functional capacity testing. FINDINGS:   Osseous structures of the knee are intact and align normally. Mild   tricompartment joint space narrowing and marginal osteophytosis. No   osteochondral defect is evident. No retained radiopaque foreign body. No   evidence of joint effusion. Impression   Mild osteoarthritis left knee. Otherwise unremarkable radiographs left knee. Follow-up imaging recommended if pain persists or worsens following   conservative management. FINDINGS:   Chest: Heart is normal in size. Lungs are clear. No free air. Osseous   structures demonstrate degenerative change. Thoracic spine: Mild degenerative change. No acute bony process. Vertebral   body heights appear well maintained. Impression   No acute findings involving the chest or thoracic spine. FINDINGS:   No change in alignment compared to prior study. No acute fracture. Impression   Stable appearance lumbar spine compared to prior study     GERD  Amy Hdzor  admits to GERD symptoms of prolonged duration. Reported symptoms include heartburn. The patient denies dysphagia, vomiting. Patient is aware of some food triggers and habits that causes exacerbation of symptoms. Risk factors present for GERD include obesity. Current therapy pepcid. Therapy results in fair relief. WEIGHT  Patient's BMI is Body mass index is 33.61 kg/m². kg/m2. BMI is decreasing.  Patient understands that this condition increases the patient's risk for chronic conditions. Wt Readings from Last 3 Encounters:   02/25/22 202 lb (91.6 kg)   10/22/21 205 lb (93 kg)   07/19/21 215 lb 12.8 oz (97.9 kg)     CURRENT SMOKER  Long time history of smoking. Amy is trying to cut down on smoking. Several unsuccessful attempts to quit in past.Amy  does not have any plans to quit. We will continue to discuss different ways to quit. Resources provided. VITAL SIGNS:  Vitals:    02/25/22 1054   BP: 112/80   Pulse: 62   Temp: 97.5 °F (36.4 °C)   SpO2: 98%   Weight: 202 lb (91.6 kg)   Height: 5' 5\" (1.651 m)   Estimated body mass index is 33.61 kg/m² as calculated from the following:    Height as of this encounter: 5' 5\" (1.651 m). Weight as of this encounter: 202 lb (91.6 kg). Review of Systems   Constitutional: Positive for activity change. Negative for chills, fatigue and fever. Respiratory: Negative for shortness of breath and wheezing. Cardiovascular: Negative for chest pain and palpitations. Gastrointestinal: Negative for abdominal pain. Endocrine: Negative for polydipsia, polyphagia and polyuria. Genitourinary: Negative for dysuria. Musculoskeletal: Positive for arthralgias, back pain, gait problem, joint swelling and myalgias. Back pain, knee pain   Neurological: Positive for numbness. Negative for weakness and headaches. Psychiatric/Behavioral: Positive for dysphoric mood. Negative for sleep disturbance and suicidal ideas. The patient is nervous/anxious. Physical Exam  Vitals and nursing note reviewed. Constitutional:       Appearance: Normal appearance. She is obese. HENT:      Head: Normocephalic. Nose: Nose normal.   Cardiovascular:      Rate and Rhythm: Normal rate and regular rhythm. Pulmonary:      Effort: Pulmonary effort is normal.      Breath sounds: Normal breath sounds. Abdominal:      General: Abdomen is protuberant. Comments: obese   Musculoskeletal:      Lumbar back: Tenderness present. No bony tenderness. Decreased range of motion. Right knee: No swelling, deformity or bony tenderness. Decreased range of motion. Left knee: No swelling, deformity or bony tenderness. Decreased range of motion. Skin:     General: Skin is warm and dry. Neurological:      Mental Status: She is alert and oriented to person, place, and time. Psychiatric:         Mood and Affect: Mood is anxious. Speech: Speech is rapid and pressured. Behavior: Behavior is withdrawn. Thought Content: Thought content does not include homicidal or suicidal ideation. MEDICAL HISTORY      Diagnosis Date    Arthritis     Chronic midline low back pain without sciatica 7/12/2021    Coronary artery disease of native artery of native heart with stable angina pectoris ([facility] [address] 7/12/2021    Gastroesophageal reflux disease without esophagitis 5/19/2017    H/O vaginal hysterectomy 2006    Mild chronic obstructive pulmonary disease ([facility] [address] 10/22/2021    Mixed hyperlipidemia 7/12/2021    Tobacco abuse     Type 2 diabetes mellitus with hyperglycemia, without long-term current use of insulin [facility] 7/19/2021    Vision abnormalities     glasses for reading      MEDICATIONS  Prior to Visit Medications    Medication Sig Taking? Authorizing Provider   budesonide-formoterol (SYMBICORT) 160-4.5 MCG/ACT AERO Inhale 2 puffs into the lungs 2 times daily Yes ZAK Barker CNP   gabapentin (NEURONTIN) 100 MG capsule Take 1 capsule by mouth 3 times daily for 30 days.  Intended supply: 30 days Yes ZAK Barker CNP   metFORMIN (GLUCOPHAGE-XR) 500 MG extended release tablet Take 1 tablet by mouth daily (with breakfast) Yes ZAK Barker CNP   diclofenac (FLECTOR) 1.3 % PTCH patch Place 1 patch onto the skin 2 times daily Yes ZAK Barker CNP   clopidogrel (PLAVIX) 75 MG tablet Take 75 mg by mouth daily Yes Historical Provider, MD   albuterol sulfate  (90 Base) MCG/ACT inhaler Inhale Improve  Current treatment plan is effective, no change in therapy. Reviewed use, techniques, schedule and side effects of all inhaled medications  Critical need for compliance with treatment plan to achieve optimal results  Very strongly urged to quit smoking to reduce pulmonary and CVD risk. - budesonide-formoterol (SYMBICORT) 160-4.5 MCG/ACT AERO; Inhale 2 puffs into the lungs 2 times daily  Dispense: 1 each; Refill: 2    4. Mixed hyperlipidemia  Failure to Improve  Continue therapy. Advised to decrease the consumption of red meats, fried foods, trans fats, sweets, sugary beverages. Advised to increase fish, vegetables, and fruits consumption. Advised to add fiber or OTC supplements in diet. Discussed weight loss which will result in improvement of lipids levels. Advised to increase daily physical activities and add regular exercises. 5. Chronic midline low back pain without sciatica  Failure to Improve  Continue current therapy. DISCUSSED AND ADVISED TO:  Use heat packs 15 to 20 mins every 2-3 hours. Do some back stretches as tolerated. Refer to hand out for instructions. Call for worsening, numbness, weakness. - Ambulatory referral to Physical Therapy  - gabapentin (NEURONTIN) 100 MG capsule; Take 1 capsule by mouth 3 times daily for 30 days. Intended supply: 30 days  Dispense: 90 capsule; Refill: 0  - Urine Drug Screen; Future    6. Arthritis of both knees  Failure to Improve  Continue current therapy. DISCUSSED and ADVISED TO:  Stay at a healthy weight. Continue exercises/PT  Stretch to help prevent stiffness and to prevent injury before exercise. Gentle forms of yoga help keep joints and muscles flexible. Walk instead of jog, ride a bike, swim, and water exercise. Lift weights as tolerated. strong muscles help reduce stress on joints. Take pain medicines exactly as directed and only as needed.     - Ambulatory referral to Physical Therapy  - gabapentin (NEURONTIN) 100 MG

## 2022-02-25 ENCOUNTER — OFFICE VISIT (OUTPATIENT)
Dept: FAMILY MEDICINE CLINIC | Age: 57
End: 2022-02-25
Payer: COMMERCIAL

## 2022-02-25 VITALS
OXYGEN SATURATION: 98 % | SYSTOLIC BLOOD PRESSURE: 112 MMHG | WEIGHT: 202 LBS | BODY MASS INDEX: 33.66 KG/M2 | TEMPERATURE: 97.5 F | HEIGHT: 65 IN | HEART RATE: 62 BPM | DIASTOLIC BLOOD PRESSURE: 80 MMHG

## 2022-02-25 DIAGNOSIS — F17.200 CURRENT SMOKER: ICD-10-CM

## 2022-02-25 DIAGNOSIS — M17.0 ARTHRITIS OF BOTH KNEES: ICD-10-CM

## 2022-02-25 DIAGNOSIS — J44.9 MILD CHRONIC OBSTRUCTIVE PULMONARY DISEASE (HCC): ICD-10-CM

## 2022-02-25 DIAGNOSIS — E11.65 TYPE 2 DIABETES MELLITUS WITH HYPERGLYCEMIA, WITHOUT LONG-TERM CURRENT USE OF INSULIN (HCC): ICD-10-CM

## 2022-02-25 DIAGNOSIS — M54.50 CHRONIC MIDLINE LOW BACK PAIN WITHOUT SCIATICA: ICD-10-CM

## 2022-02-25 DIAGNOSIS — E66.09 CLASS 1 OBESITY DUE TO EXCESS CALORIES WITH SERIOUS COMORBIDITY AND BODY MASS INDEX (BMI) OF 33.0 TO 33.9 IN ADULT: ICD-10-CM

## 2022-02-25 DIAGNOSIS — K21.9 GASTROESOPHAGEAL REFLUX DISEASE WITHOUT ESOPHAGITIS: ICD-10-CM

## 2022-02-25 DIAGNOSIS — G89.29 CHRONIC MIDLINE LOW BACK PAIN WITHOUT SCIATICA: ICD-10-CM

## 2022-02-25 DIAGNOSIS — E78.2 MIXED HYPERLIPIDEMIA: ICD-10-CM

## 2022-02-25 DIAGNOSIS — I25.118 CORONARY ARTERY DISEASE OF NATIVE ARTERY OF NATIVE HEART WITH STABLE ANGINA PECTORIS (HCC): Primary | ICD-10-CM

## 2022-02-25 LAB — HBA1C MFR BLD: 6.2 %

## 2022-02-25 PROCEDURE — G8484 FLU IMMUNIZE NO ADMIN: HCPCS | Performed by: FAMILY MEDICINE

## 2022-02-25 PROCEDURE — 99214 OFFICE O/P EST MOD 30 MIN: CPT | Performed by: FAMILY MEDICINE

## 2022-02-25 PROCEDURE — 3044F HG A1C LEVEL LT 7.0%: CPT | Performed by: FAMILY MEDICINE

## 2022-02-25 PROCEDURE — 2022F DILAT RTA XM EVC RTNOPTHY: CPT | Performed by: FAMILY MEDICINE

## 2022-02-25 PROCEDURE — G8417 CALC BMI ABV UP PARAM F/U: HCPCS | Performed by: FAMILY MEDICINE

## 2022-02-25 PROCEDURE — 4004F PT TOBACCO SCREEN RCVD TLK: CPT | Performed by: FAMILY MEDICINE

## 2022-02-25 PROCEDURE — G8427 DOCREV CUR MEDS BY ELIG CLIN: HCPCS | Performed by: FAMILY MEDICINE

## 2022-02-25 PROCEDURE — 3023F SPIROM DOC REV: CPT | Performed by: FAMILY MEDICINE

## 2022-02-25 PROCEDURE — 3017F COLORECTAL CA SCREEN DOC REV: CPT | Performed by: FAMILY MEDICINE

## 2022-02-25 PROCEDURE — 83036 HEMOGLOBIN GLYCOSYLATED A1C: CPT | Performed by: FAMILY MEDICINE

## 2022-02-25 RX ORDER — GABAPENTIN 100 MG/1
100 CAPSULE ORAL 3 TIMES DAILY
Qty: 90 CAPSULE | Refills: 0 | Status: SHIPPED | OUTPATIENT
Start: 2022-02-25 | End: 2022-05-13 | Stop reason: SDUPTHER

## 2022-02-25 RX ORDER — BUDESONIDE AND FORMOTEROL FUMARATE DIHYDRATE 160; 4.5 UG/1; UG/1
2 AEROSOL RESPIRATORY (INHALATION) 2 TIMES DAILY
Qty: 1 EACH | Refills: 2 | Status: SHIPPED | OUTPATIENT
Start: 2022-02-25 | End: 2022-03-27

## 2022-02-25 RX ORDER — BACLOFEN 10 MG/1
1 TABLET ORAL 3 TIMES DAILY PRN
COMMUNITY
Start: 2021-12-26

## 2022-02-25 ASSESSMENT — PATIENT HEALTH QUESTIONNAIRE - PHQ9
1. LITTLE INTEREST OR PLEASURE IN DOING THINGS: 0
SUM OF ALL RESPONSES TO PHQ QUESTIONS 1-9: 0
SUM OF ALL RESPONSES TO PHQ QUESTIONS 1-9: 0
2. FEELING DOWN, DEPRESSED OR HOPELESS: 0
SUM OF ALL RESPONSES TO PHQ QUESTIONS 1-9: 0
SUM OF ALL RESPONSES TO PHQ9 QUESTIONS 1 & 2: 0
SUM OF ALL RESPONSES TO PHQ QUESTIONS 1-9: 0

## 2022-02-25 ASSESSMENT — ENCOUNTER SYMPTOMS
BACK PAIN: 1
WHEEZING: 0

## 2022-02-25 NOTE — PATIENT INSTRUCTIONS
Patient Education        Learning About Carbohydrate (Carb) Counting and Eating Out When You Have Diabetes  Why plan your meals? Meal planning can be a key part of managing diabetes. Planning meals and snacks with the right balance of carbohydrate, protein, and fat can help you keep your blood sugar at the target level you set with your doctor. You don't have to eat special foods. You can eat what your family eats, including sweets once in a while. But you do have to pay attention to how often you eat and how much you eat of certain foods. You may want to work with a dietitian or a certified diabetes educator. He or she can give you tips and meal ideas and can answer your questions about meal planning. This health professional can also help you reach a healthy weight if that is one of your goals. What should you know about eating carbs? Managing the amount of carbohydrate (carbs) you eat is an important part of healthy meals when you have diabetes. Carbohydrate is found in many foods. · Learn which foods have carbs. And learn the amounts of carbs in different foods. ? Bread, cereal, pasta, and rice have about 15 grams of carbs in a serving. A serving is 1 slice of bread (1 ounce), ½ cup of cooked cereal, or 1/3 cup of cooked pasta or rice. ? Fruits have 15 grams of carbs in a serving. A serving is 1 small fresh fruit, such as an apple or orange; ½ of a banana; ½ cup of cooked or canned fruit; ½ cup of fruit juice; 1 cup of melon or raspberries; or 2 tablespoons of dried fruit. ? Milk and no-sugar-added yogurt have 15 grams of carbs in a serving. A serving is 1 cup of milk or 2/3 cup of no-sugar-added yogurt. ? Starchy vegetables have 15 grams of carbs in a serving. A serving is ½ cup of mashed potatoes or sweet potato; 1 cup winter squash; ½ of a small baked potato; ½ cup of cooked beans; or ½ cup cooked corn or green peas.   · Learn how much carbs to eat each day and at each meal. A dietitian or CDE can teach you how to keep track of the amount of carbs you eat. This is called carbohydrate counting. · If you are not sure how to count carbohydrate grams, use the Plate Method to plan meals. It is a good, quick way to make sure that you have a balanced meal. It also helps you spread carbs throughout the day. ? Divide your plate by types of foods. Put non-starchy vegetables on half the plate, meat or other protein food on one-quarter of the plate, and a grain or starchy vegetable in the final quarter of the plate. To this you can add a small piece of fruit and 1 cup of milk or yogurt, depending on how many carbs you are supposed to eat at a meal.  · Try to eat about the same amount of carbs at each meal. Do not \"save up\" your daily allowance of carbs to eat at one meal.  · Proteins have very little or no carbs per serving. Examples of proteins are beef, chicken, turkey, fish, eggs, tofu, cheese, cottage cheese, and peanut butter. A serving size of meat is 3 ounces, which is about the size of a deck of cards. Examples of meat substitute serving sizes (equal to 1 ounce of meat) are 1/4 cup of cottage cheese, 1 egg, 1 tablespoon of peanut butter, and ½ cup of tofu. How can you eat out and still eat healthy? · Learn to estimate the serving sizes of foods that have carbohydrate. If you measure food at home, it will be easier to estimate the amount in a serving of restaurant food. · If the meal you order has too much carbohydrate (such as potatoes, corn, or baked beans), ask to have a low-carbohydrate food instead. Ask for a salad or green vegetables. · If you use insulin, check your blood sugar before and after eating out to help you plan how much to eat in the future. · If you eat more carbohydrate at a meal than you had planned, take a walk or do other exercise. This will help lower your blood sugar. What are some tips for eating healthy? · Limit saturated fat, such as the fat from meat and dairy products.  This is a healthy choice because people who have diabetes are at higher risk of heart disease. So choose lean cuts of meat and nonfat or low-fat dairy products. Use olive or canola oil instead of butter or shortening when cooking. · Don't skip meals. Your blood sugar may drop too low if you skip meals and take insulin or certain medicines for diabetes. · Check with your doctor before you drink alcohol. Alcohol can cause your blood sugar to drop too low. Alcohol can also cause a bad reaction if you take certain diabetes medicines. Follow-up care is a key part of your treatment and safety. Be sure to make and go to all appointments, and call your doctor if you are having problems. It's also a good idea to know your test results and keep a list of the medicines you take. Where can you learn more? Go to https://[employer]. org and sign in to your [employer] account. Enter G855 in the [employer] box to learn more about \"Learning About Carbohydrate (Carb) Counting and Eating Out When You Have Diabetes. \"     If you do not have an account, please click on the \"Sign Up Now\" link. Current as of: September 8, 2021               Content Version: 13.1  © 2869-9929 [employer]. Care instructions adapted under license by [facility] ([facility]). If you have questions about a medical condition or this instruction, always ask your healthcare professional. [place] [address] any warranty or liability for your use of this information.

## 2022-02-25 NOTE — LETTER
CONTROLLED SUBSTANCE MEDICATION AGREEMENT     Patient Name: Skylar Dowling  Patient YOB: 1965   I understand, that controlled substance medications may be used to help better manage my symptoms and to improve my ability to function at home, work and in social settings. However, I also understand that these medications do have risks, which have been discussed with me, including possible development of physical or psychological dependence. I understand that successful treatment requires mutual trust and honesty between me and my provider. I understand and agree that following this Medication Agreement is necessary in continuing my provider-patient relationship and the success of my treatment plan. Explanation from my Provider: Benefits and Goals of Controlled Substance Medications: There are two potential goals for your treatment: (1) decreased pain and suffering (2) improved daily life functions. There are many possible treatments for your chronic condition(s). Alternatives such as physical therapy, yoga, massage, home daily exercise, meditation, relaxation techniques, injections, chiropractic manipulations, surgery, cognitive therapy, hypnosis and many medications that are not habit-forming may be used. Use of controlled substance medications may be helpful, but they are unlikely to resolve all symptoms or restore all function. Explanation from my Provider: Risks of Controlled Substance Medications:  Opioid pain medications: These medications can lead to problems such as addiction/dependence, sedation, lightheadedness/dizziness, memory issues, falls, constipation, nausea, or vomiting. They may also impair the ability to drive or operate machinery. Additionally, these medications may lower testosterone levels, leading to loss of bone strength, stamina and sex drive.   They may cause problems with breathing, sleep apnea and reduced coughing, which is especially dangerous for patients with lung disease. Overdose or dangerous interactions with alcohol and other medications may occur, leading to death. Hyperalgesia may develop, which means patients receiving opioids for the treatment of pain may become more sensitive to certain painful stimuli, and in some cases, experience pain from ordinarily non-painful stimuli. Women between the ages of 14-53 who could become pregnant should carefully weigh the risks and benefits of opioids with their physicians, as these medications increase the risk of pregnancy complications, including miscarriage,  delivery and stillbirth. It is also possible for babies to be born addicted to opioids. Opioid dependence withdrawal symptoms may include; feelings of uneasiness, increased pain, irritability, belly pain, diarrhea, sweats and goose-flesh. Benzodiazepines and non-benzodiazepine sleep medications: These medications can lead to problems such as addiction/dependence, sedation, fatigue, lightheadedness, dizziness, incoordination, falls, depression, hallucinations, and impaired judgment, memory and concentration. The ability to drive and operate machinery may also be affected. Abnormal sleep-related behaviors have been reported, including sleepwalking, driving, making telephone calls, eating, or having sex while not fully awake. These medications can suppress breathing and worsen sleep apnea, particularly when combined with alcohol or other sedating medications, potentially leading to death. Dependence withdrawal symptoms may include tremors, anxiety, hallucinations and seizures. Stimulants:  Common adverse effects include addiction/dependence, increased blood  pressure and heart rate, decreased appetite, nausea, involuntary weight loss, insomnia,                                                                                                                     Initials:_______   irritability, and headaches.   These risks may increase when these medications are combined with other stimulants, such as caffeine pills or energy drinks, certain weight loss supplements and oral decongestants. Dependence withdrawal symptoms may include depressed mood, loss of interest, suicidal thoughts, anxiety, fatigue, appetite changes and agitation. Testosterone replacement therapy:  Potential side effects include increased risk of stroke and heart attack, blood clots, increased blood pressure, increased cholesterol, enlarged prostate, sleep apnea, irritability/aggression and other mood disorders, and decreased fertility. I agree and understand that I and my prescriber have the following rights and responsibilities regarding my treatment plan:     1. MY RIGHTS:  To be informed of my treatment and medication plan. To be an active participant in my health and wellbeing. 2. MY RESPONSIBILITY AND UNDERSTANDING FOR USE OF MEDICATIONS   I will take medications at the dose and frequency as directed. For my safety, I will not increase or change how I take my medications without the recommendation of my healthcare provider.  I will actively participate in any program recommended by my provider which may improve function, including social, physical, psychological programs.  I will not take my medications with alcohol or other drugs not prescribed to me. I understand that drinking alcohol with my medications increases the chances of side effects, including reduced breathing rate and could lead to personal injury when operating machinery.  I understand that if I have a history of substance use disorders, including alcohol or other illicit drugs, that I may be at increased risk of addiction to my medications.  I agree to notify my provider immediately if I should become pregnant so that my treatment plan can be adjusted.    I agree and understand that I shall only receive controlled substance medications from the prescriber that signed this agreement unless there is written agreement among other prescribers of controlled substances outlining the responsibility of the medications being prescribed.  I understand that the if the controlled medication is not helping to achieve goals, the dosage may be tapered and no longer prescribed. 3. MY RESPONSIBILITY FOR COMMUNICATION / PRESCRIPTION RENEWALS   I agree that all controlled substance medications that I take will be prescribed only by my provider. If another healthcare provider prescribes me medication in an emergency, I will notify my provider within seventy-two (72) hours.  I will arrange for refills at the prescribed interval ONLY during regular office hours. I will not ask for refills earlier than agreed, after-hours, on holidays or weekends. Refills may take up to 72 hours for processing and prescriptions to reach the pharmacy.  I will inform my other health care providers that I am taking these medications and of the existence of this [id number]. In the event of an emergency, I will provide the same information to the emergency department prescribers.  I will keep my provider updated on the pharmacy I am using for controlled medication prescription filling. Initials:_______  4. MY RESPONSIBILITY FOR PROTECTING MEDICATIONS   I will protect my prescriptions and medications. I understand that lost or misplaced prescriptions will not be replaced.  I will keep medications only for my own use and will not share them with others. I will keep all medications away from children.  I agree that if my medications are adjusted or discontinued, I will properly dispose of any remaining medications. I understand that I will be required to dispose of any remaining controlled medications as, directed by my prescriber, prior to being provided with any prescriptions for other controlled medications.   Medication drop box locations can be found at: [email or web address].[email or web address]    5. MY RESPONSIBILITY WITH ILLEGAL DRUGS    I will not use illegal or street drugs or another person's prescription medications not prescribed to me.  If there are identified addiction type symptoms, then referral to a program may be provided by my provider and I agree to follow through with this recommendation. 6. MY RESPONSIBILITY FOR COOPERATION WITH INVESTIGATIONS   I understand that my provider will comply with any applicable law and may discuss my use and/or possible misuse/abuse of controlled substances and alcohol, as appropriate, with any health care provider involved in my care, pharmacist, or legal authority.  I authorize my provider and pharmacy to cooperate fully with law enforcement agencies (as permitted by law) in the investigation of any possible misuse, sale, or other diversion of my controlled substances.  I agree to waive any applicable privilege or right of privacy or confidentiality with respect to these authorizations. 7. PROVIDERS RIGHT TO MONITOR FOR SAFETY: PRESCRIPTION MONITORING / DRUG TESTING   I consent to drug/toxicology screening and will submit to a drug screen upon my providers request to assure I am only taking the prescribed drugs for my safety monitoring. I understand that a drug screen is a laboratory test in which a sample of my urine, blood or saliva is checked to see what drugs I have been taking. This may entail an observed urine specimen, which means that a nurse or other health care provider may watch me provide urine, and I will cooperate if I am asked to provide an observed specimen.  I understand that my provider will check a copy of my State Prescription Monitoring Program () Report in order to safely prescribe medications.  Pill Counts: I consent to pill counts when requested.   I may be asked to bring all my prescribed controlled substance medications, in their original bottles, to all of my scheduled appointments. In addition, my provider may ask me to come to the practice at any time for a random pill count. 8. TERMINATION OF THIS AGREEMENT  For my safety, my prescriber has the right to stop prescribing controlled substance medications and may end this agreement. Initials:_______   Conditions that may result in termination of this agreement:  a. I do not show any improvement in pain, or my activity has not improved. b. I develop rapid tolerance or loss of improvement, as described in my treatment plan.  c. I develop significant side effects from the medication. d. My behavior is not consistent with the responsibilities outlined above, thereby causing safety concerns to continue prescribing controlled substance medications. e. I fail to follow the terms of this agreement. f. Other:____________________________       UNDERSTANDING THIS MEDICATION AGREEMENT:    I have read the above and have had all my questions answered. For chronic disease management, I know that my symptoms can be managed with many types of treatments. A chronic medication trial may be part of my treatment, but I must be an active participant in my care. Medication therapy is only one part of my symptom management plan. In some cases, there may be limited scientific evidence to support the chronic use of certain medications to improve symptoms and daily function. Furthermore, in certain circumstances, there may be scientific information that suggests that the use of chronic controlled substances may worsen my symptoms and increase my risk of unintentional death directly related to this medication therapy. I know that if my provider feels my risk from controlled medications is greater than my benefit, I will have my controlled substance medication(s) compassionately lowered or removed altogether.      I further agree to allow this office to contact my HIPAA contact if there are concerns about my safety and use of the controlled medications. I have agreed to use the prescribed controlled substance medications to me as instructed by my provider and as stated in this Medication Agreement. My initial on each page and my signature below shows that I have read each page and I have had the opportunity to ask questions with answers provided by my provider.     Patient Name (Printed): _____________________________________  Patient Signature:  ______________________   Date: _____________    Prescriber Name (Printed): ___________________________________  Prescriber Signature: _____________________  Date: _____________

## 2022-02-25 NOTE — PROGRESS NOTES
Visit Information    Have you changed or started any medications since your last visit including any over-the-counter medicines, vitamins, or herbal medicines? no   Have you stopped taking any of your medications? Is so, why? -  no  Are you having any side effects from any of your medications? - no    Have you seen any other physician or provider since your last visit? yes -    Have you had any other diagnostic tests since your last visit?  no   Have you been seen in the emergency room and/or had an admission in a hospital since we last saw you?  yes -    Have you had your routine dental cleaning in the past 6 months?  no - dentures    Do you have an active MyChart account? If no, what is the barrier?   Yes    Patient Care Team:  ZAK Santiago CNP as PCP - General (Family Medicine)  [facility]  Sharon Bardales DO as Consulting Physician (Obstetrics & Gynecology)    Medical History Review  Past Medical, Family, and Social History reviewed and does contribute to the patient presenting condition    Health Maintenance   Topic Date Due    Diabetic foot exam  Never done    Diabetic retinal exam  Never done    Hepatitis B vaccine (1 of 3 - Risk 3-dose series) Never done    DTaP/Tdap/Td vaccine (1 - Tdap) Never done    Shingles Vaccine (1 of 2) Never done    COVID-19 Vaccine (3 - Booster for Rusty Kayla series) 10/21/2021    Flu vaccine (1) 10/22/2022 (Originally 9/1/2021)    Depression Screen  07/12/2022    Lipid screen  07/13/2022    A1C test (Diabetic or Prediabetic)  10/22/2022    Diabetic microalbuminuria test  11/01/2022    Low dose CT lung screening  11/01/2022    Breast cancer screen  07/14/2023    Colorectal Cancer Screen  08/27/2025    Pneumococcal 0-64 years Vaccine (2 of 2 - PPSV23) 10/13/2029    Hepatitis C screen  Completed    HIV screen  Completed    Hepatitis A vaccine  Aged Out    Hib vaccine  Aged Out    Meningococcal (ACWY) vaccine  Aged Out

## 2022-04-05 ENCOUNTER — HOSPITAL ENCOUNTER (OUTPATIENT)
Dept: PHYSICAL THERAPY | Facility: CLINIC | Age: 57
Setting detail: THERAPIES SERIES
Discharge: HOME | End: 2022-04-05

## 2022-04-05 PROCEDURE — 97750 PHYSICAL PERFORMANCE TEST: CPT

## 2022-05-13 ENCOUNTER — TELEPHONE (OUTPATIENT)
Dept: FAMILY MEDICINE CLINIC | Age: 57
End: 2022-05-13

## 2022-05-13 DIAGNOSIS — M17.0 ARTHRITIS OF BOTH KNEES: ICD-10-CM

## 2022-05-13 DIAGNOSIS — M54.50 CHRONIC MIDLINE LOW BACK PAIN WITHOUT SCIATICA: ICD-10-CM

## 2022-05-13 DIAGNOSIS — G89.29 CHRONIC MIDLINE LOW BACK PAIN WITHOUT SCIATICA: ICD-10-CM

## 2022-05-13 RX ORDER — GABAPENTIN 100 MG/1
200 CAPSULE ORAL 3 TIMES DAILY
Qty: 180 CAPSULE | Refills: 0 | Status: SHIPPED | OUTPATIENT
Start: 2022-05-13 | End: 2022-06-27 | Stop reason: SDUPTHER

## 2022-05-13 NOTE — TELEPHONE ENCOUNTER
Patient  called to request if a script can be called in for patient to help with arthritiis pain in knees. Stated that the Tylenol OTC is not working at the moment for pain.

## 2022-06-26 ASSESSMENT — ENCOUNTER SYMPTOMS
SHORTNESS OF BREATH: 0
WHEEZING: 0
BACK PAIN: 1
ABDOMINAL PAIN: 0

## 2022-06-27 ENCOUNTER — OFFICE VISIT (OUTPATIENT)
Dept: FAMILY MEDICINE CLINIC | Age: 57
End: 2022-06-27

## 2022-06-27 ENCOUNTER — TELEPHONE (OUTPATIENT)
Dept: FAMILY MEDICINE CLINIC | Age: 57
End: 2022-06-27

## 2022-06-27 VITALS
HEIGHT: 65 IN | OXYGEN SATURATION: 98 % | TEMPERATURE: 97.4 F | HEART RATE: 74 BPM | BODY MASS INDEX: 36.32 KG/M2 | SYSTOLIC BLOOD PRESSURE: 120 MMHG | WEIGHT: 218 LBS | DIASTOLIC BLOOD PRESSURE: 80 MMHG

## 2022-06-27 DIAGNOSIS — E78.2 MIXED HYPERLIPIDEMIA: ICD-10-CM

## 2022-06-27 DIAGNOSIS — E11.65 TYPE 2 DIABETES MELLITUS WITH HYPERGLYCEMIA, WITHOUT LONG-TERM CURRENT USE OF INSULIN (HCC): ICD-10-CM

## 2022-06-27 DIAGNOSIS — G89.29 CHRONIC MIDLINE LOW BACK PAIN WITHOUT SCIATICA: ICD-10-CM

## 2022-06-27 DIAGNOSIS — I25.118 CORONARY ARTERY DISEASE OF NATIVE ARTERY OF NATIVE HEART WITH STABLE ANGINA PECTORIS (HCC): Primary | ICD-10-CM

## 2022-06-27 DIAGNOSIS — M17.0 ARTHRITIS OF BOTH KNEES: ICD-10-CM

## 2022-06-27 DIAGNOSIS — J44.9 MILD CHRONIC OBSTRUCTIVE PULMONARY DISEASE (HCC): ICD-10-CM

## 2022-06-27 DIAGNOSIS — G44.221 CHRONIC TENSION-TYPE HEADACHE, INTRACTABLE: ICD-10-CM

## 2022-06-27 DIAGNOSIS — M54.50 CHRONIC MIDLINE LOW BACK PAIN WITHOUT SCIATICA: ICD-10-CM

## 2022-06-27 DIAGNOSIS — K21.9 GASTROESOPHAGEAL REFLUX DISEASE WITHOUT ESOPHAGITIS: ICD-10-CM

## 2022-06-27 DIAGNOSIS — E66.09 CLASS 1 OBESITY DUE TO EXCESS CALORIES WITH SERIOUS COMORBIDITY AND BODY MASS INDEX (BMI) OF 33.0 TO 33.9 IN ADULT: ICD-10-CM

## 2022-06-27 LAB — HBA1C MFR BLD: 6.2 %

## 2022-06-27 PROCEDURE — 83036 HEMOGLOBIN GLYCOSYLATED A1C: CPT | Performed by: FAMILY MEDICINE

## 2022-06-27 PROCEDURE — 3044F HG A1C LEVEL LT 7.0%: CPT | Performed by: FAMILY MEDICINE

## 2022-06-27 PROCEDURE — 99214 OFFICE O/P EST MOD 30 MIN: CPT | Performed by: FAMILY MEDICINE

## 2022-06-27 RX ORDER — ZOSTER VACCINE RECOMBINANT, ADJUVANTED 50 MCG/0.5
0.5 KIT INTRAMUSCULAR ONCE
Qty: 0.5 ML | Refills: 0 | Status: CANCELLED | OUTPATIENT
Start: 2022-06-27 | End: 2022-06-27

## 2022-06-27 RX ORDER — BUPROPION HYDROCHLORIDE 150 MG/1
150 TABLET ORAL EVERY MORNING
Qty: 90 TABLET | Refills: 1 | Status: SHIPPED | OUTPATIENT
Start: 2022-06-27

## 2022-06-27 RX ORDER — GABAPENTIN 100 MG/1
200 CAPSULE ORAL 3 TIMES DAILY
Qty: 180 CAPSULE | Refills: 0 | Status: SHIPPED | OUTPATIENT
Start: 2022-06-27 | End: 2022-08-16 | Stop reason: SDUPTHER

## 2022-06-27 RX ORDER — BUTALBITAL, ACETAMINOPHEN AND CAFFEINE 50; 325; 40 MG/1; MG/1; MG/1
1 TABLET ORAL EVERY 6 HOURS PRN
Qty: 180 TABLET | Refills: 2 | Status: SHIPPED | OUTPATIENT
Start: 2022-06-27

## 2022-06-27 RX ORDER — BUPROPION HYDROCHLORIDE 150 MG/1
150 TABLET ORAL 2 TIMES DAILY
Qty: 180 TABLET | Refills: 1 | Status: SHIPPED | OUTPATIENT
Start: 2022-06-27 | End: 2022-06-27

## 2022-06-27 ASSESSMENT — PATIENT HEALTH QUESTIONNAIRE - PHQ9
SUM OF ALL RESPONSES TO PHQ QUESTIONS 1-9: 0
SUM OF ALL RESPONSES TO PHQ QUESTIONS 1-9: 0
1. LITTLE INTEREST OR PLEASURE IN DOING THINGS: 0
SUM OF ALL RESPONSES TO PHQ9 QUESTIONS 1 & 2: 0
SUM OF ALL RESPONSES TO PHQ QUESTIONS 1-9: 0
SUM OF ALL RESPONSES TO PHQ QUESTIONS 1-9: 0
2. FEELING DOWN, DEPRESSED OR HOPELESS: 0

## 2022-06-27 NOTE — TELEPHONE ENCOUNTER
Received call from [place] stating that the pts medication Wellbutrin dosage is written for 2xs daily and this medication is typically only a once a day medication.

## 2022-06-27 NOTE — PATIENT INSTRUCTIONS
New Updates for [facility] MyChart/ [employer] ([facility]) CASANDRA    Thank you for choosing US to give you the best care! [facility]) is always trying to think of new ways to help their patients. We are asking all patients to try out the new digital registration that is now available through your [facility] account or the new CASANDRA, [employer] ([facility]). Via the casandra you're now able to update your personal and registration information prior to your upcoming appointment. This will save you time once you arrive at the office to check-in, not to mention your information remains safe!! Many other perks come from signing up for an account, such as:   Requesting refills   Scheduling an appointment   Completing an [address] Sending a message to the office/provider   Having access to your medication list   Paying your bill/copay prior to your appointment   Scheduling your yearly mammogram   Review your test results    If you are not familiar with [facility] or the [employer] [facility] CASANDRA, please ask one of us and we will be happy to answer any questions or help you set-up your account. Your [facility] office,  [facility]  Patient Education        Learning About Carbohydrate (Carb) Counting and Eating Out When You Have Diabetes  Why plan your meals? Meal planning can be a key part of managing diabetes. Planning meals and snacks with the right balance of carbohydrate, protein, and fat can help you keep yourblood sugar at the target level you set with your doctor. You don't have to eat special foods. You can eat what your family eats, including sweets once in a while. But you do have to pay attention to how oftenyou eat and how much you eat of certain foods. You may want to work with a dietitian or a diabetes educator. They can give you tips and meal ideas and can answer your questions about meal planning.  This health professional can also help you reach a healthy weight if that is one ofyour goals.  What should you know about eating carbs? Managing the amount of carbohydrate (carbs) you eat is an important part ofhealthy meals when you have diabetes. Carbohydrate is found in many foods.  Learn which foods have carbs. And learn the amounts of carbs in different foods. ? Bread, cereal, pasta, and rice have about 15 grams of carbs in a serving. A serving is 1 slice of bread (1 ounce), ½ cup of cooked cereal, or 1/3 cup of cooked pasta or rice. ? Fruits have 15 grams of carbs in a serving. A serving is 1 small fresh fruit, such as an apple or orange; ½ of a banana; ½ cup of cooked or canned fruit; ½ cup of fruit juice; 1 cup of melon or raspberries; or 2 tablespoons of dried fruit. ? Milk and no-sugar-added yogurt have 15 grams of carbs in a serving. A serving is 1 cup of milk or 3/4 cup (6 oz) of no-sugar-added yogurt. ? Starchy vegetables have 15 grams of carbs in a serving. A serving is ½ cup of mashed potatoes or sweet potato; 1 cup winter squash; ½ of a small baked potato; ½ cup of cooked beans; or ½ cup cooked corn or green peas.  Learn how much carbs to eat each day and at each meal. A dietitian or certified diabetes educator can teach you how to keep track of the amount of carbs you eat. This is called carbohydrate counting.  If you are not sure how to count carbohydrate grams, use the plate method to plan meals. It is a quick way to make sure that you have a balanced meal. It also can help you manage the amount of carbohydrate you eat at meals. ? Divide your plate by types of foods. Put non-starchy vegetables on half the plate, meat or other protein food on one-quarter of the plate, and a grain or starchy vegetable in the final quarter of the plate.  To this you can add a small piece of fruit and 1 cup of milk or yogurt, depending on how many carbs you are supposed to eat at a meal.   Try to eat about the same amount of carbs at each meal. Do not \"save up\" your daily allowance of carbs to eat at one meal.   Proteins have very little or no carbs. Examples of proteins are beef, chicken, turkey, fish, eggs, tofu, cheese, cottage cheese, and peanut butter. How can you eat out and still eat healthy?  Learn to estimate the serving sizes of foods that have carbohydrate. If you measure food at home, it will be easier to estimate the amount in a serving of restaurant food.  If the meal you order has too much carbohydrate (such as potatoes, corn, or baked beans), ask to have a low-carbohydrate food instead. Ask for a salad or non-starchy vegetables like broccoli, cauliflower, green beans, or peppers.  If you eat more carbohydrate at a meal than you had planned, take a walk or do other exercise. This will help lower your blood sugar. What are some tips for eating healthy?  Limit saturated fat, such as the fat from meat and dairy products. This is a healthy choice because people who have diabetes are at higher risk of heart disease. So choose lean cuts of meat and nonfat or low-fat dairy products. Use olive or canola oil instead of butter or shortening when cooking.  Don't skip meals. Your blood sugar may drop too low if you skip meals and take insulin or certain medicines for diabetes.  Check with your doctor before you drink alcohol. Alcohol can cause your blood sugar to drop too low. Alcohol can also cause a bad reaction if you take certain diabetes medicines. Follow-up care is a key part of your treatment and safety. Be sure to make and go to all appointments, and call your doctor if you are having problems. It's also a good idea to know your test results and keep alist of the medicines you take. Where can you learn more? Go to https://feroz.[employer]. org and sign in to your [employer] account. Enter T434 in the [facility] box to learn more about \"Learning About Carbohydrate (Carb) Counting and Eating Out When You Have Diabetes. \"     If you do not have an account, please click on the \"Sign Up Now\" link. Current as of: September 8, 2021               Content Version: 13.3  © 2006-2022 Healthwise, Inc[employer]. Care instructions adapted under license by [facility] ([facility]). If you have questions about a medical condition or this instruction, always ask your healthcare professional. [address] any warranty or liability for your use of this information.

## 2022-06-27 NOTE — PROGRESS NOTES
[address]  Jovanna [facility]wood [facility] [address].  [address] [place] [id number]  Dept: 420 Michelle Robertson (:  1965) is a 61 y.o. female. Patient is here for evaluation of the following chief complaint(s):  Chief Complaint   Patient presents with    Diabetes    Other     wants no vaccines     Headache     when she wakes up         SUBJECTIVE/OBJECTIVE:  MAURICE Duarte is a 61 y.o. female patient. Patient is an established patient of mine. Patient has a known history of CAD, diabetes, COPD, hyperlipidemia, low back pain, obesity, GERD, smoker, tension headache,  and bilateral knee arthritis    No Insurance  Working at [employer] a lot of hours sitting . Will not get medical.  Waiting for Gary Andrea is a 61 y.o. female patient complains of recurrent headache she describes as achy and dull headache happening every morning usually Bitemporal sometimes by occipital Every morning. Hypersomnia. No nausea, no photophobia. CAD- saw cardiologist-  Patient has a cardiac stent placed 2021   Patient reported chest pain prior to hospitalization. Patient was also found with severe hypotension while in the hospital although asymptomatic. Patient was not taking medication previously. Patient is currently adherent with her therapy but is having some issues with the price of  Plavix. Patient had been recently seen by Edouard Ngo- saw him last month-  in hospital.  still has SOB. March- scheduled, geoff have another stress test scheduled  Lab Results   Component Value Date    LVEF 59 2021     DIABETES MELLITUS- stable  Patient has a  stable Diabetes Mellitus. Current therapy includes Metformin XR-. Patient is responding well with this therapy. Patient reports home glucose monitoring as Stable readings. Patient denieshypoglycemia episodes such as{symptoms. Patient denies neither vomiting nor diarrhea.    Eye Exam: UP TO DATE     Foot Exam:due  Lab Results   Component Value Date/Time    LABA1C 6.2 06/27/2022 11:25 AM    LABA1C 6.2 02/25/2022 11:21 AM      COPD/ Saira Gregory has a known history of COPD. Patient is currently on Albuterol, recently started Symbicort - not much difference. .  Patient reports that she has been using the albuterol round-the-clock otherwise she will be having some shortness of breath. Therefore we will start her on some Dulera. Patient reports relief with this therapy. Patient denies any adverse reaction to current therapy. Smoker- started Chantix-insurance won't pay for. Will try wellbutrin. Jose Rock is currently on atorvastatin (Lipitor). Have started this medication prior to testing. Patient denies adverse reaction to this medication. Compliance with treatment thus far has been good. The patient is known to have coexisting coronary artery disease. The 10-year CVD risk score (RUTH'Erniestino, et al., 2008) is: 20%    Values used to calculate the score:      Age: 61 years      Sex: Female      Diabetic: Yes      Tobacco smoker: Yes      Systolic Blood Pressure: 359 mmHg      Is BP treated: No      HDL Cholesterol: 34 mg/dL      Total Cholesterol: 184 mg/dL  Lab Results   Component Value Date/Time    CHOL 184 07/13/2021 08:50 AM    HDL 34 (L) 07/13/2021 08:50 AM    LDLCHOLESTEROL 121 07/13/2021 08:50 AM    CHOLHDLRATIO 5.4 (H) 07/13/2021 08:50 AM    TRIG 143 07/13/2021 08:50 AM    VLDL NOT REPORTED 07/13/2021 08:50 AM     LOW BACK PAIN  Patient also reported a chronic problem with low back pain. No change  She arrived today with moderate to severe type of pain. We did not receive any previous records from [facility] where she had most of her testing done. Patient was seen and evaluated at [facility] he has had diagnostic exam.  She was told that she does have some degenerative disc disease in might have had some slipped disc. No routine follow-up was done.   She describes the pain is constant achy type of pain on her mid lower abdomen but sometimes radiates to the right or left side. Patient does not recall if there were any type of incident or accident prior to having these issues. Tylenol. Not helping. Ibuprofen is also not helping very well. But since patient is on Plavix we will go ahead and trial her on some baclofen, gabapentin, and Flector patch. Patient's insurance did not cover lidocaine patch. Patient is also referred to take some muscle relaxant since she works a lot. Patient was told that she does have bilateral knee arthritis most recently left knee x-ray was done provide some mild osteoarthritis diagnosis. Patient did not follow-up or have not gone to the specialist who saw her before. We will continue to discuss this in future visits. Lab Results   Component Value Date    CREATININE 0.82 06/29/2021     BILATERAL KNEE ARTHRITIS  Still has a lot of pain especially with standing for a long period of time. Gave in a few times, stils a lot at work  Patient had a flare up on her right knee  Gabapentin helps. Drug screen not done      FINDINGS:   Osseous structures of the knee are intact and align normally. Mild   tricompartment joint space narrowing and marginal osteophytosis. No   osteochondral defect is evident. No retained radiopaque foreign body. No   evidence of joint effusion. Impression   Mild osteoarthritis left knee. Otherwise unremarkable radiographs left knee. Follow-up imaging recommended if pain persists or worsens following   conservative management. GERD  Amy Miranda  admits to GERD symptoms of prolonged duration. Reported symptoms include heartburn. The patient denies dysphagia, vomiting. Patient is aware of some food triggers and habits that causes exacerbation of symptoms. Risk factors present for GERD include obesity. Current therapy pepcid. Therapy results in fair relief. WEIGHT  Patient's BMI is Body mass index is 36.28 kg/m². kg/m2.  BMI is increasing. Patient understands that this condition increases the patient's risk for chronic conditions. Wt Readings from Last 3 Encounters:   06/27/22 218 lb (98.9 kg)   02/25/22 202 lb (91.6 kg)   10/22/21 205 lb (93 kg)     CURRENT SMOKER  Long time history of smoking. Amy is trying to cut down on smoking. Several unsuccessful attempts to quit in past.Amy  does not have any plans to quit. We will continue to discuss different ways to quit. Resources provided. chantix not covered, will try welrubenutrin    VITAL SIGNS:  Vitals:    06/27/22 1106   BP: 120/80   Pulse: 74   Temp: 97.4 °F (36.3 °C)   SpO2: 98%   Weight: 218 lb (98.9 kg)   Height: 5' 5\" (1.651 m)   Estimated body mass index is 36.28 kg/m² as calculated from the following:    Height as of this encounter: 5' 5\" (1.651 m). Weight as of this encounter: 218 lb (98.9 kg). Review of Systems   Constitutional: Positive for activity change and unexpected weight change. Negative for chills, fatigue and fever. Respiratory: Negative for shortness of breath and wheezing. Cardiovascular: Negative for chest pain and palpitations. Gastrointestinal: Negative for abdominal pain. Genitourinary: Negative for dysuria. Musculoskeletal: Positive for arthralgias, back pain, gait problem, joint swelling and myalgias. Back pain, knee pain   Neurological: Positive for numbness and headaches. Negative for weakness. Psychiatric/Behavioral: Positive for dysphoric mood. Negative for sleep disturbance and suicidal ideas. The patient is nervous/anxious. Physical Exam  Vitals and nursing note reviewed. Constitutional:       Appearance: Normal appearance. She is obese. HENT:      Head: Normocephalic. Nose: Nose normal.   Cardiovascular:      Rate and Rhythm: Normal rate and regular rhythm. Pulmonary:      Effort: Pulmonary effort is normal.      Breath sounds: Normal breath sounds. Abdominal:      General: Abdomen is protuberant. Comments: obese   Musculoskeletal:      Lumbar back: Tenderness present. No bony tenderness. Decreased range of motion. Right knee: No swelling, deformity or bony tenderness. Decreased range of motion. Left knee: No swelling, deformity or bony tenderness. Decreased range of motion. Skin:     General: Skin is warm and dry. Neurological:      Mental Status: She is alert and oriented to person, place, and time. Psychiatric:         Mood and Affect: Mood is anxious. Speech: Speech is rapid and pressured. Thought Content: Thought content does not include suicidal ideation. MEDICAL HISTORY      Diagnosis Date    Arthritis     Chronic midline low back pain without sciatica 7/12/2021    Coronary artery disease of native artery of native heart with stable angina pectoris ([facility] [address] 7/12/2021    Gastroesophageal reflux disease without esophagitis 5/19/2017    H/O vaginal hysterectomy 2006    Mild chronic obstructive pulmonary disease ([facility] [address] 10/22/2021    Mixed hyperlipidemia 7/12/2021    Tobacco abuse     Type 2 diabetes mellitus with hyperglycemia, without long-term current use of insulin ([facility] [address] 7/19/2021    Vision abnormalities     glasses for reading      MEDICATIONS  Prior to Visit Medications    Medication Sig Taking? Authorizing Provider   butalbital-acetaminophen-caffeine (FIORICET, ESGIC) -40 MG per tablet Take 1 tablet by mouth every 6 hours as needed for Headaches Yes ZAK Barker CNP   gabapentin (NEURONTIN) 100 MG capsule Take 2 capsules by mouth 3 times daily for 30 days.  Intended supply: 30 days Yes ZAK Barker CNP   buPROPion (WELLBUTRIN XL) 150 MG extended release tablet Take 1 tablet by mouth every morning Yes ZAK Barker CNP   metFORMIN (GLUCOPHAGE-XR) 500 MG extended release tablet Take 1 tablet by mouth daily (with breakfast) Yes ZAK Barker CNP   baclofen (LIORESAL) 10 MG tablet Take 1 tablet by mouth 3 times daily as needed Yes Historical Provider, MD   clopidogrel (PLAVIX) 75 MG tablet Take 75 mg by mouth daily Yes Historical Provider, MD   Blood Glucose Monitoring Suppl (BLOOD GLUCOSE MONITOR SYSTEM) w/Device KIT Use as directed. Check glucose levels FASTING DAILY and as needed Yes Mai Qureshi MD   blood glucose monitor strips Test FASTING DAILY . BRAND OF CHOICE INSURANCE ALLOWS. Yes Mai Qureshi MD   Alcohol Swabs (ALCOHOL PREP) PADS Use as directed Yes ZAK Barker CNP   Lancets MISC 1 each by Does not apply route 2 times daily Yes ZAK Barker CNP   aspirin 81 MG EC tablet Take 1 tablet by mouth daily Yes ZAK Gentile CNP   atorvastatin (LIPITOR) 40 MG tablet Take 1 tablet by mouth nightly Yes ZAK Gentile CNP   metoprolol tartrate (LOPRESSOR) 25 MG tablet Take 0.5 tablets by mouth 2 times daily Yes ZAK Gentile CNP   budesonide-formoterol (SYMBICORT) 160-4.5 MCG/ACT AERO Inhale 2 puffs into the lungs 2 times daily  ZAK Barker CNP   diclofenac (FLECTOR) 1.3 % PTCH patch Place 1 patch onto the skin 2 times daily  Patient not taking: Reported on 6/27/2022  ZAK Barker CNP   albuterol sulfate  (90 Base) MCG/ACT inhaler Inhale 2 puffs into the lungs every 6 hours as needed for Wheezing or Shortness of Breath  ZAK Barker CNP   acetaminophen (TYLENOL) 500 MG tablet Take 1 tablet by mouth every 6 hours as needed for Pain (Take 1-2 tablets every 6 hours)  ZAK Barker CNP       ASSESSMENT/PLAN:  1. Coronary artery disease of native artery of native heart with stable angina pectoris ([address]  Failure to Improve  DISCUSSED and ADVISED TO:  Discussed serious causes of CP. Advised to go to the ER for worsening CP/SOB           2. Type 2 diabetes mellitus with hyperglycemia, without long-term current use of insulin (HCC)  Failure to Improve  Continue therapy.   We will continue to monitor Hemoglobin A1c   DISCUSSED and ADVISED TO:  Continue to check Glucose levels at home. Report increased and low levels as discussed. Decrease carbohydrates, sugary drinks, desserts in your diet. Exercise regularly, as tolerated. Try to lose weight.      - POCT glycosylated hemoglobin (Hb A1C)    3. Mild chronic obstructive pulmonary disease (HCC)  Failure to Improve  Current treatment plan is effective, no change in therapy. Reviewed use, techniques, schedule and side effects of all inhaled medications  Critical need for compliance with treatment plan to achieve optimal results  Very strongly urged to quit smoking to reduce pulmonary and CVD risk. 4. Mixed hyperlipidemia  Failure to Improve  Continue therapy. Advised to decrease the consumption of red meats, fried foods, trans fats, sweets, sugary beverages. Advised to increase fish, vegetables, and fruits consumption. Advised to add fiber or OTC supplements in diet. Discussed weight loss which will result in improvement of lipids levels. Advised to increase daily physical activities and add regular exercises. 5. Chronic tension-type headache, intractable  Worsening  Take medications as discussed. DISCUSSED AND ADVISED TO:  Find ways to manage stress. Rest well, cut down on caffeine, and alcohol intake  Stop smoking.=  Report for increasing incidences and worsening symptoms.    - butalbital-acetaminophen-caffeine (FIORICET, ESGIC) -40 MG per tablet; Take 1 tablet by mouth every 6 hours as needed for Headaches  Dispense: 180 tablet; Refill: 2    6. Arthritis of both knees  Failure to Improve  Continue current therapy. DISCUSSED and ADVISED TO:  Stay at a healthy weight. Continue exercises/PT  Stretch to help prevent stiffness and to prevent injury before exercise. Gentle forms of yoga help keep joints and muscles flexible. Walk instead of jog, ride a bike, swim, and water exercise. Lift weights as tolerated.  strong muscles help reduce stress on joints. Take pain medicines exactly as directed and only as needed. - gabapentin (NEURONTIN) 100 MG capsule; Take 2 capsules by mouth 3 times daily for 30 days. Intended supply: 30 days  Dispense: 180 capsule; Refill: 0    7. Chronic midline low back pain without sciatica  Failure to Improve  Continue current therapy. DISCUSSED AND ADVISED TO:  Use heat packs 15 to 20 mins every 2-3 hours. Do some back stretches as tolerated. Refer to hand out for instructions. Call for worsening, numbness, weakness.      - gabapentin (NEURONTIN) 100 MG capsule; Take 2 capsules by mouth 3 times daily for 30 days. Intended supply: 30 days  Dispense: 180 capsule; Refill: 0    8. Gastroesophageal reflux disease without esophagitis  Failure to Improve  Continue therapy  DISCUSSED AND ADVISED TO:  Avoid food triggers. Stop eating large meals close to bedtime  Don't eat meals too close to bedtime  Avoid ASA, NSAID's, caffeine, peppermints, alcohol and tobacco.  Report for worsening symptoms. 9. Class 1 obesity due to excess calories with serious comorbidity and body mass index (BMI) of 33.0 to 33.9 in adult  Failure to Improve  BMI increasing  DISCUSSED AND ADVISED TO:  Eat a low-fat and low carbohydrates diet. Avoid fried foods especially fast food. Choose healthier options for snacks. Have 5-6 servings of fruits and vegetables per day. Cut down on eating processed food. Add 30 minutes to 1 hour aerobic exercise for 3-4 days a week. Return in about 3 months (around 9/27/2022) for Chronic conditions, 30mins. This note was completed by using the assistance of a speech-recognition program. However, inadvertent computerized transcription errors may be present. Although every effort was made to ensure accuracy, no guarantees can be provided that every mistake has been identified and corrected by editing.   Electronically signed by ZAK Velasco CNP on 7/18/21 at 7:41 PM [place]     --Gerald Shepherd, APRN - CNP

## 2022-07-01 ENCOUNTER — HOSPITAL ENCOUNTER (OUTPATIENT)
Age: 57
Discharge: HOME | End: 2022-07-01

## 2022-07-01 DIAGNOSIS — I25.118 CORONARY ARTERY DISEASE OF NATIVE ARTERY OF NATIVE HEART WITH STABLE ANGINA PECTORIS (HCC): Primary | ICD-10-CM

## 2022-07-01 DIAGNOSIS — G89.29 CHRONIC MIDLINE LOW BACK PAIN WITHOUT SCIATICA: ICD-10-CM

## 2022-07-01 DIAGNOSIS — Z95.5 HISTORY OF HEART ARTERY STENT: ICD-10-CM

## 2022-07-01 DIAGNOSIS — M17.0 ARTHRITIS OF BOTH KNEES: ICD-10-CM

## 2022-07-01 DIAGNOSIS — M54.50 CHRONIC MIDLINE LOW BACK PAIN WITHOUT SCIATICA: ICD-10-CM

## 2022-07-01 DIAGNOSIS — J44.9 MILD CHRONIC OBSTRUCTIVE PULMONARY DISEASE (HCC): ICD-10-CM

## 2022-07-01 LAB
AMPHETAMINE SCREEN URINE: NEGATIVE
BARBITURATE SCREEN URINE: NEGATIVE
BENZODIAZEPINE SCREEN, URINE: NEGATIVE
CANNABINOID SCREEN URINE: NEGATIVE
COCAINE METABOLITE, URINE: NEGATIVE
METHADONE SCREEN, URINE: NEGATIVE
OPIATES, URINE: NEGATIVE
OXYCODONE SCREEN URINE: NEGATIVE
PHENCYCLIDINE, URINE: NEGATIVE
TEST INFORMATION: NORMAL

## 2022-07-01 PROCEDURE — 80307 DRUG TEST PRSMV CHEM ANLYZR: CPT

## 2022-08-16 DIAGNOSIS — G89.29 CHRONIC MIDLINE LOW BACK PAIN WITHOUT SCIATICA: ICD-10-CM

## 2022-08-16 DIAGNOSIS — M54.50 CHRONIC MIDLINE LOW BACK PAIN WITHOUT SCIATICA: ICD-10-CM

## 2022-08-16 DIAGNOSIS — M17.0 ARTHRITIS OF BOTH KNEES: ICD-10-CM

## 2022-08-16 RX ORDER — GABAPENTIN 100 MG/1
200 CAPSULE ORAL 3 TIMES DAILY
Qty: 180 CAPSULE | Refills: 0 | Status: SHIPPED | OUTPATIENT
Start: 2022-08-16 | End: 2022-09-15

## 2022-08-29 DIAGNOSIS — E11.65 TYPE 2 DIABETES MELLITUS WITH HYPERGLYCEMIA, WITHOUT LONG-TERM CURRENT USE OF INSULIN (HCC): ICD-10-CM

## 2022-08-30 RX ORDER — METFORMIN HYDROCHLORIDE 500 MG/1
TABLET, EXTENDED RELEASE ORAL
Qty: 30 TABLET | Refills: 0 | Status: SHIPPED | OUTPATIENT
Start: 2022-08-30 | End: 2022-09-29

## 2022-09-26 PROBLEM — E55.9 VITAMIN D DEFICIENCY: Status: ACTIVE | Noted: 2022-09-26

## 2022-10-03 ENCOUNTER — TELEPHONE (OUTPATIENT)
Dept: ONCOLOGY | Age: 57
End: 2022-10-03

## 2022-10-03 DIAGNOSIS — Z87.891 PERSONAL HISTORY OF NICOTINE DEPENDENCE: Primary | ICD-10-CM

## 2022-10-03 NOTE — TELEPHONE ENCOUNTER
Our records indicate that your patient is coming due for their annual lung cancer screening follow up testing. For your convenience, we have pended the order for the scan for you. If you do not agree with the need for the test, please cancel the order and let us know. Sincerely,    [address] [place] [address] Screening Program    Auto printed reminder letter sent to patient.

## 2022-11-14 ASSESSMENT — ENCOUNTER SYMPTOMS
WHEEZING: 0
ABDOMINAL PAIN: 0
BACK PAIN: 1
SHORTNESS OF BREATH: 0

## 2022-11-14 NOTE — PROGRESS NOTES
[address]  Jovanna Haywood [facility].  [address] [place] [id number]  Dept: 420 Michelle Robertson (:  1965) is a 61 y.o. female. Patient is here for evaluation of the following chief complaint(s):  Chief Complaint   Patient presents with    Follow-up    Discuss Medications     Would like to discuss  multivitamins     Other     Declines flu vaccine today    Diabetes     A1c:pending         SUBJECTIVE/OBJECTIVE:  MAURICE Julian is a 61 y.o. female patient. Patient is an established patient of mine. Patient has a known history of CAD, diabetes, COPD, hyperlipidemia, low back pain, obesity, GERD, smoker, tension headache,  and bilateral knee arthritis    Patient is working. No insurance. Will get insurance soon. CAD-  Maryana Julian is a 61 y.o. female patient saw cardiologist-2 months ago- metoprolol increased 25 BID. Appointment in January-- nuclear study still pending  Patient complains that every now and then chest discomfort- mid sternal associated with some activities. Patient has a cardiac stent placed 2021   Patient reported chest pain prior to hospitalization. Patient was also found with severe hypotension while in the hospital although asymptomatic. Patient was not taking medication previously. Patient is currently adherent with her therapy but is having some issues with the price of  Plavix. Patient had been established with -  Lab Results   Component Value Date    LVEF 59 2021     HYPERTENSION  Amy Logan has a well controlled hypertension. she is currently on metoprolol- added Losartan. Patient's most recent BP in the office was stable. she reports stable BP readings at home. Patient denies any adverse reaction to this therapy. she denies any CP, SOB, HA, or palpitations. Patient admits to exercising and adheres to low salt diet. No history of organ damage due to condition noted.   Lab Results   Component Value Date/Time    CREATININE 0.82 06/29/2021 09:01 PM     Pulse Readings from Last 3 Encounters:   11/17/22 76   06/27/22 74   02/25/22 62     DIABETES MELLITUS- stable  Patient has a  stable Diabetes Mellitus. Current therapy includes Metformin XR-no change. Patient is responding well with this therapy. Patient reports home glucose monitoring as Stable readings. Patient denieshypoglycemia episodes such as{symptoms. Patient denies neither vomiting nor diarrhea. Eye Exam: UP TO DATE     Foot Exam:due  Lab Results   Component Value Date/Time    LABA1C 6.5 11/17/2022 08:41 AM    LABA1C 6.2 06/27/2022 11:25 AM      COPD/ Sulma Sham has a known history of COPD. Patient is currently on Albuterol, Symbicort   Patient reports that she has been using the albuterol round-the-clock otherwise she will be having some shortness of breath. Therefore we will start her on some Dulera. Patient reports relief with this therapy. Patient denies any adverse reaction to current therapy. Smoker- started Chantix-insurance won't pay for.wellbutrinEd Ernandez is currently on atorvastatin (Lipitor). Have started this medication prior to testing. Patient denies adverse reaction to this medication. Compliance with treatment thus far has been good. The patient is known to have coexisting coronary artery disease.  The 10-year CVD risk score (D'Agostino, et al., 2008) is: 37%    Values used to calculate the score:      Age: 61 years      Sex: Female      Diabetic: Yes      Tobacco smoker: Yes      Systolic Blood Pressure: 179 mmHg      Is BP treated: No      HDL Cholesterol: 34 mg/dL      Total Cholesterol: 184 mg/dL  Lab Results   Component Value Date/Time    CHOL 184 07/13/2021 08:50 AM    HDL 34 (L) 07/13/2021 08:50 AM    LDLCHOLESTEROL 121 07/13/2021 08:50 AM    CHOLHDLRATIO 5.4 (H) 07/13/2021 08:50 AM    TRIG 143 07/13/2021 08:50 AM    VLDL NOT REPORTED 07/13/2021 08:50 AM     LOW BACK PAIN  Patient also reported a chronic problem with low back pain. She still complains about about mid lower back pain. Patient is now working. Pain is worse when standing and walking for long period time. She arrived today with moderate to severe type of pain. We did not receive any previous records from [facility] where she had most of her testing done. Patient was seen and evaluated at [facility] he has had diagnostic exam.  She was told that she does have some degenerative disc disease in might have had some slipped disc. No routine follow-up was done. She describes the pain is constant achy type of pain on her mid lower abdomen but sometimes radiates to the right or left side. Patient does not recall if there were any type of incident or accident prior to having these issues. Tylenol. Not helping. Ibuprofen is also not helping very well. But since patient is on Plavix we will go ahead and trial her on some baclofen, gabapentin, and Flector patch. Patient have not been getting the baclofen and gabapentin due to cost   patient's insurance did not cover lidocaine patch. Patient is also referred to take some muscle relaxant since she works a lot. Patient was told that she does have bilateral knee arthritis most recently left knee x-ray was done provide some mild osteoarthritis diagnosis. Patient did not follow-up or have not gone to the specialist who saw her before. We will continue to discuss this in future visits. Lab Results   Component Value Date    CREATININE 0.82 06/29/2021     BILATERAL KNEE ARTHRITIS  Patient states that she still has a lot of pain especially with standing for a long period of time. Or walking for long period time. Patient has not been getting gabapentin for a while she recognized that this is helping a lot. Patient had a flare up on her right knee  Gabapentin helps. Drug screen not done      FINDINGS:   Osseous structures of the knee are intact and align normally.   Mild   tricompartment joint space narrowing (36.1 °C)   TempSrc: Temporal   SpO2: 96%   Weight: 220 lb (99.8 kg)   Height: 5' 5\" (1.651 m)   Estimated body mass index is 36.61 kg/m² as calculated from the following:    Height as of this encounter: 5' 5\" (1.651 m). Weight as of this encounter: 220 lb (99.8 kg). Review of Systems   Constitutional:  Positive for activity change and unexpected weight change. Negative for chills, fatigue and fever. Respiratory:  Negative for shortness of breath and wheezing. Cardiovascular:  Negative for chest pain and palpitations. Gastrointestinal:  Negative for abdominal pain. Genitourinary:  Negative for dysuria. Musculoskeletal:  Positive for arthralgias, back pain, gait problem, joint swelling and myalgias. Back pain, knee pain   Neurological:  Positive for numbness and headaches. Negative for weakness. Hematological:  Negative for adenopathy. Psychiatric/Behavioral:  Positive for dysphoric mood. Negative for sleep disturbance and suicidal ideas. The patient is nervous/anxious. Physical Exam  Vitals and nursing note reviewed. Constitutional:       Appearance: Normal appearance. She is obese. HENT:      Head: Normocephalic. Nose: Nose normal.   Cardiovascular:      Rate and Rhythm: Normal rate and regular rhythm. Pulmonary:      Effort: Pulmonary effort is normal.      Breath sounds: Normal breath sounds. Abdominal:      General: Abdomen is protuberant. Comments: obese   Musculoskeletal:      Lumbar back: Tenderness present. No bony tenderness. Decreased range of motion. Right knee: No swelling, deformity or bony tenderness. Decreased range of motion. Left knee: No swelling, deformity or bony tenderness. Decreased range of motion. Skin:     General: Skin is warm and dry. Neurological:      Mental Status: She is alert and oriented to person, place, and time. Psychiatric:         Mood and Affect: Mood is anxious. Speech: Speech is rapid and pressured. Behavior: Behavior is not slowed. Thought Content: Thought content does not include suicidal ideation. MEDICAL HISTORY      Diagnosis Date    Arthritis     Chronic midline low back pain without sciatica 7/12/2021    Coronary artery disease of native artery of native heart with stable angina pectoris ([facility] 7/12/2021    Gastroesophageal reflux disease without esophagitis 5/19/2017    H/O vaginal hysterectomy 2006    Mild chronic obstructive pulmonary disease ([facility] 10/22/2021    Mixed hyperlipidemia 7/12/2021    Tobacco abuse     Type 2 diabetes mellitus with hyperglycemia, without long-term current use of insulin ([facility] 7/19/2021    Vision abnormalities     glasses for reading      MEDICATIONS  Prior to Visit Medications    Medication Sig Taking? Authorizing Provider   gabapentin (NEURONTIN) 100 MG capsule Take 2 capsules by mouth 3 times daily for 30 days.  Intended supply: 30 days Yes ZAK Barker CNP   butalbital-acetaminophen-caffeine (FIORICET, ESGIC) -40 MG per tablet Take 1 tablet by mouth every 6 hours as needed for Headaches Yes ZAK Barker CNP   budesonide-formoterol (SYMBICORT) 160-4.5 MCG/ACT AERO Inhale 2 puffs into the lungs 2 times daily Yes ZAK Barker CNP   albuterol sulfate HFA (PROVENTIL;VENTOLIN;PROAIR) 108 (90 Base) MCG/ACT inhaler Inhale 2 puffs into the lungs every 6 hours as needed for Wheezing or Shortness of Breath Yes ZAK Barker CNP   buPROPion (WELLBUTRIN XL) 150 MG extended release tablet Take 1 tablet by mouth every morning Yes ZAK Barker CNP   atorvastatin (LIPITOR) 40 MG tablet Take 1 tablet by mouth nightly Yes ZAK Barker CNP   metoprolol tartrate (LOPRESSOR) 25 MG tablet Take 1 tablet by mouth 2 times daily Yes ZAK Barker CNP   baclofen (LIORESAL) 10 MG tablet Take 1 tablet by mouth 3 times daily as needed (stiffness) Yes ZAK Barker CNP acetaminophen (TYLENOL) 500 MG tablet Take 1 tablet by mouth every 6 hours as needed for Pain (Take 1-2 tablets every 6 hours) Yes ZAK Barker CNP   famotidine (PEPCID) 20 MG tablet Take 1 tablet by mouth 2 times daily Yes ZAK Barker CNP   metFORMIN (GLUCOPHAGE-XR) 500 MG extended release tablet Take 1 tablet by mouth once daily with breakfast Yes ZAK Barker CNP   Handicap Placard MISC by Does not apply route Expires July 1, 2027 Yes ZAK Barker CNP   clopidogrel (PLAVIX) 75 MG tablet Take 75 mg by mouth daily Yes Historical Provider, MD   Blood Glucose Monitoring Suppl (BLOOD GLUCOSE MONITOR SYSTEM) w/Device KIT Use as directed. Check glucose levels FASTING DAILY and as needed Yes Shoshana Macias MD   blood glucose monitor strips Test FASTING DAILY . BRAND OF CHOICE INSURANCE ALLOWS. Yes Shoshana Macias MD   Alcohol Swabs (ALCOHOL PREP) PADS Use as directed Yes ZAK Barker CNP   Lancets MISC 1 each by Does not apply route 2 times daily Yes ZAK Barker CNP   aspirin 81 MG EC tablet Take 1 tablet by mouth daily Yes ZAK Piña CNP       ASSESSMENT/PLAN:  1. Coronary artery disease of native artery of native heart with stable angina pectoris ([facility]  Stable  Continue current therapy  DISCUSSED and ADVISED TO:  Encouraged to follow up with the specialist  Discussed serious causes of CP. Advised to go to the ER for worsening CP/SOB         - metoprolol tartrate (LOPRESSOR) 25 MG tablet; Take 1 tablet by mouth 2 times daily  Dispense: 60 tablet; Refill: 1  - Comprehensive Metabolic Panel, Fasting; Future  - Urinalysis with Reflex to Culture; Future  - CBC with Auto Differential; Future    2. Essential hypertension  Failure to Improve  Added losartan  Continue current therapy. DISCUSSED AND ADVISED TO:  Cut down on your salt intake. Cut down on caffeinated drinks, sports drinks.    Instructed to check BP at home regularly. Report for any chest pains, shortness of breath, headaches, and lightheadedness. Call the office if your blood pressure continue to be higher than 140/90 or 90/50.    - losartan (COZAAR) 25 MG tablet; Take 1 tablet by mouth daily  Dispense: 30 tablet; Refill: 1    3. Type 2 diabetes mellitus with hyperglycemia, without long-term current use of insulin (HCC)  Stable  Continue therapy. We will continue to monitor Hemoglobin A1c   DISCUSSED and ADVISED TO:  Continue to check Glucose levels at home. Report increased and low levels as discussed. Decrease carbohydrates, sugary drinks, desserts in your diet. Exercise regularly, as tolerated. Try to lose weight.    - POCT glycosylated hemoglobin (Hb A1C)  - Comprehensive Metabolic Panel, Fasting; Future  - CBC with Auto Differential; Future    4. Mixed hyperlipidemia  Stable  Continue therapy. Advised to decrease the consumption of red meats, fried foods, trans fats, sweets, sugary beverages. Advised to increase fish, vegetables, and fruits consumption. Advised to add fiber or OTC supplements in diet. Discussed weight loss which will result in improvement of lipids levels. Advised to increase daily physical activities and add regular exercises. - atorvastatin (LIPITOR) 40 MG tablet; Take 1 tablet by mouth nightly  Dispense: 30 tablet; Refill: 1  - Comprehensive Metabolic Panel, Fasting; Future  - Lipid, Fasting; Future    5. Mild chronic obstructive pulmonary disease (HCC)  Stable  Current treatment plan is effective, no change in therapy. Reviewed use, techniques, schedule and side effects of all inhaled medications  Critical need for compliance with treatment plan to achieve optimal results  Very strongly urged to quit smoking to reduce pulmonary and CVD risk. - budesonide-formoterol (SYMBICORT) 160-4.5 MCG/ACT AERO; Inhale 2 puffs into the lungs 2 times daily  Dispense: 1 each;  Refill: 1  - albuterol sulfate HFA (PROVENTIL;VENTOLIN;PROAIR) 108 (90 Base) MCG/ACT inhaler; Inhale 2 puffs into the lungs every 6 hours as needed for Wheezing or Shortness of Breath  Dispense: 18 g; Refill: 1    6. Chronic tension-type headache, intractable  Failure to Improve  Take medications as discussed. DISCUSSED AND ADVISED TO:  Find ways to manage stress. Rest well, cut down on caffeine, and alcohol intake  Stop smoking. Report for increasing incidences and worsening symptoms.    - butalbital-acetaminophen-caffeine (FIORICET, ESGIC) -40 MG per tablet; Take 1 tablet by mouth every 6 hours as needed for Headaches  Dispense: 180 tablet; Refill: 1    7. Chronic midline low back pain without sciatica  Failure to Improve  Continue current therapy. DISCUSSED AND ADVISED TO:  Use heat packs 15 to 20 mins every 2-3 hours. Do some back stretches as tolerated. Refer to hand out for instructions. Call for worsening, numbness, weakness.    - gabapentin (NEURONTIN) 100 MG capsule; Take 2 capsules by mouth 3 times daily for 30 days. Intended supply: 30 days  Dispense: 180 capsule; Refill: 0  - baclofen (LIORESAL) 10 MG tablet; Take 1 tablet by mouth 3 times daily as needed (stiffness)  Dispense: 90 tablet; Refill: 1  - acetaminophen (TYLENOL) 500 MG tablet; Take 1 tablet by mouth every 6 hours as needed for Pain (Take 1-2 tablets every 6 hours)  Dispense: 120 tablet; Refill: 1    8. Arthritis of both knees  Failure to Improve  Continue current therapy. DISCUSSED and ADVISED TO:  Stay at a healthy weight. Continue exercises/PT  Stretch to help prevent stiffness and to prevent injury before exercise. Gentle forms of yoga help keep joints and muscles flexible. Walk instead of jog, ride a bike, swim, and water exercise. Lift weights as tolerated. strong muscles help reduce stress on joints. Take pain medicines exactly as directed and only as needed. - gabapentin (NEURONTIN) 100 MG capsule; Take 2 capsules by mouth 3 times daily for 30 days.  Intended supply: 30 days  Dispense: 180 capsule; Refill: 0  - baclofen (LIORESAL) 10 MG tablet; Take 1 tablet by mouth 3 times daily as needed (stiffness)  Dispense: 90 tablet; Refill: 1  - acetaminophen (TYLENOL) 500 MG tablet; Take 1 tablet by mouth every 6 hours as needed for Pain (Take 1-2 tablets every 6 hours)  Dispense: 120 tablet; Refill: 1    9. Gastroesophageal reflux disease without esophagitis  Stable  Continue therapy  DISCUSSED AND ADVISED TO:  Avoid food triggers. Stop eating large meals close to bedtime  Don't eat meals too close to bedtime  Avoid ASA, NSAID's, caffeine, peppermints, alcohol and tobacco.  Report for worsening symptoms. - famotidine (PEPCID) 20 MG tablet; Take 1 tablet by mouth 2 times daily  Dispense: 60 tablet; Refill: 1    10. Vitamin D deficiency  Stable  Continue Vitamin D supplementation  DISCUSSED AND ADVISED TO:  Foods that contain a lot of vitamin D includes Steens, tuna, and mackerel. Cheese, egg yolks, and beef liver have small amounts of vit D.  Milk, soy drinks, orange juice, yogurt, margarine, and some kinds of cereal have vitamin D added to them. Continue to use sunblock when out in the sun to prevent skin cancer.    - Vitamin D 25 Hydroxy; Future    11. Current smoker  Failure to Improve  On wellbutrin  Counseling given to quit smoking. Support offered. Will continue to encourage to quit  Continue to monitor    - buPROPion (WELLBUTRIN XL) 150 MG extended release tablet; Take 1 tablet by mouth every morning  Dispense: 30 tablet; Refill: 1    12. Class 2 severe obesity due to excess calories with serious comorbidity and body mass index (BMI) of 36.0 to 36.9 in adult [facility])  Failure to Improve  BMI decreasing  DISCUSSED AND ADVISED TO:  Eat a low-fat and low carbohydrates diet. Avoid fried foods especially fast food. Choose healthier options for snacks. Have 5-6 servings of fruits and vegetables per day. Cut down on eating processed food.   Add 30 minutes to 1 hour aerobic exercise for 3-4 days a week. On this date 11/17/2022 I have spent 35 minutes reviewing previous notes, test results and face to face with the patient discussing the diagnosis and importance of compliance with the treatment plan as well as documenting on the day of the visit. Return in about 4 months (around 3/17/2023) for Chronic conditions, Appt w/ Dr. Juan Patel. This note was completed by using the assistance of a speech-recognition program. However, inadvertent computerized transcription errors may be present. Although every effort was made to ensure accuracy, no guarantees can be provided that every mistake has been identified and corrected by editing.   Electronically signed by ZAK Peck CNP on 7/18/21 at 7:41 PM EDT     --ZAK Peck CNP

## 2022-11-17 ENCOUNTER — OFFICE VISIT (OUTPATIENT)
Dept: FAMILY MEDICINE CLINIC | Age: 57
End: 2022-11-17

## 2022-11-17 VITALS
TEMPERATURE: 97 F | HEIGHT: 65 IN | WEIGHT: 220 LBS | HEART RATE: 76 BPM | DIASTOLIC BLOOD PRESSURE: 90 MMHG | BODY MASS INDEX: 36.65 KG/M2 | RESPIRATION RATE: 20 BRPM | OXYGEN SATURATION: 96 % | SYSTOLIC BLOOD PRESSURE: 140 MMHG

## 2022-11-17 DIAGNOSIS — G44.221 CHRONIC TENSION-TYPE HEADACHE, INTRACTABLE: ICD-10-CM

## 2022-11-17 DIAGNOSIS — I25.118 CORONARY ARTERY DISEASE OF NATIVE ARTERY OF NATIVE HEART WITH STABLE ANGINA PECTORIS (HCC): Primary | ICD-10-CM

## 2022-11-17 DIAGNOSIS — J44.9 MILD CHRONIC OBSTRUCTIVE PULMONARY DISEASE (HCC): ICD-10-CM

## 2022-11-17 DIAGNOSIS — E78.2 MIXED HYPERLIPIDEMIA: ICD-10-CM

## 2022-11-17 DIAGNOSIS — E11.65 TYPE 2 DIABETES MELLITUS WITH HYPERGLYCEMIA, WITHOUT LONG-TERM CURRENT USE OF INSULIN (HCC): ICD-10-CM

## 2022-11-17 DIAGNOSIS — K21.9 GASTROESOPHAGEAL REFLUX DISEASE WITHOUT ESOPHAGITIS: ICD-10-CM

## 2022-11-17 DIAGNOSIS — M17.0 ARTHRITIS OF BOTH KNEES: ICD-10-CM

## 2022-11-17 DIAGNOSIS — M54.50 CHRONIC MIDLINE LOW BACK PAIN WITHOUT SCIATICA: ICD-10-CM

## 2022-11-17 DIAGNOSIS — F17.200 CURRENT SMOKER: ICD-10-CM

## 2022-11-17 DIAGNOSIS — E66.01 CLASS 2 SEVERE OBESITY DUE TO EXCESS CALORIES WITH SERIOUS COMORBIDITY AND BODY MASS INDEX (BMI) OF 36.0 TO 36.9 IN ADULT (HCC): ICD-10-CM

## 2022-11-17 DIAGNOSIS — E55.9 VITAMIN D DEFICIENCY: ICD-10-CM

## 2022-11-17 DIAGNOSIS — I10 ESSENTIAL HYPERTENSION: ICD-10-CM

## 2022-11-17 DIAGNOSIS — G89.29 CHRONIC MIDLINE LOW BACK PAIN WITHOUT SCIATICA: ICD-10-CM

## 2022-11-17 LAB — HBA1C MFR BLD: 6.5 %

## 2022-11-17 PROCEDURE — 99214 OFFICE O/P EST MOD 30 MIN: CPT | Performed by: FAMILY MEDICINE

## 2022-11-17 PROCEDURE — 83036 HEMOGLOBIN GLYCOSYLATED A1C: CPT | Performed by: FAMILY MEDICINE

## 2022-11-17 PROCEDURE — 3078F DIAST BP <80 MM HG: CPT | Performed by: FAMILY MEDICINE

## 2022-11-17 PROCEDURE — 3074F SYST BP LT 130 MM HG: CPT | Performed by: FAMILY MEDICINE

## 2022-11-17 PROCEDURE — 3044F HG A1C LEVEL LT 7.0%: CPT | Performed by: FAMILY MEDICINE

## 2022-11-17 RX ORDER — BACLOFEN 10 MG/1
10 TABLET ORAL 3 TIMES DAILY PRN
Qty: 90 TABLET | Refills: 1 | Status: SHIPPED | OUTPATIENT
Start: 2022-11-17

## 2022-11-17 RX ORDER — ACETAMINOPHEN 500 MG
500 TABLET ORAL EVERY 6 HOURS PRN
Qty: 120 TABLET | Refills: 1 | Status: SHIPPED | OUTPATIENT
Start: 2022-11-17 | End: 2022-12-17

## 2022-11-17 RX ORDER — BUPROPION HYDROCHLORIDE 150 MG/1
150 TABLET ORAL EVERY MORNING
Qty: 30 TABLET | Refills: 1 | Status: SHIPPED | OUTPATIENT
Start: 2022-11-17

## 2022-11-17 RX ORDER — LOSARTAN POTASSIUM 25 MG/1
25 TABLET ORAL DAILY
Qty: 30 TABLET | Refills: 1 | Status: SHIPPED | OUTPATIENT
Start: 2022-11-17

## 2022-11-17 RX ORDER — GABAPENTIN 100 MG/1
200 CAPSULE ORAL 3 TIMES DAILY
Qty: 180 CAPSULE | Refills: 0 | Status: SHIPPED | OUTPATIENT
Start: 2022-11-17 | End: 2022-12-17

## 2022-11-17 RX ORDER — BUTALBITAL, ACETAMINOPHEN AND CAFFEINE 50; 325; 40 MG/1; MG/1; MG/1
1 TABLET ORAL EVERY 6 HOURS PRN
Qty: 180 TABLET | Refills: 1 | Status: SHIPPED | OUTPATIENT
Start: 2022-11-17

## 2022-11-17 RX ORDER — FAMOTIDINE 20 MG/1
20 TABLET, FILM COATED ORAL 2 TIMES DAILY
Qty: 60 TABLET | Refills: 1 | Status: SHIPPED | OUTPATIENT
Start: 2022-11-17

## 2022-11-17 RX ORDER — BUDESONIDE AND FORMOTEROL FUMARATE DIHYDRATE 160; 4.5 UG/1; UG/1
2 AEROSOL RESPIRATORY (INHALATION) 2 TIMES DAILY
Qty: 1 EACH | Refills: 1 | Status: SHIPPED | OUTPATIENT
Start: 2022-11-17 | End: 2022-12-17

## 2022-11-17 RX ORDER — ALBUTEROL SULFATE 90 UG/1
2 AEROSOL, METERED RESPIRATORY (INHALATION) EVERY 6 HOURS PRN
Qty: 18 G | Refills: 1 | Status: SHIPPED | OUTPATIENT
Start: 2022-11-17 | End: 2022-12-17

## 2022-11-17 RX ORDER — ATORVASTATIN CALCIUM 40 MG/1
40 TABLET, FILM COATED ORAL NIGHTLY
Qty: 30 TABLET | Refills: 1 | Status: SHIPPED | OUTPATIENT
Start: 2022-11-17

## 2022-11-17 SDOH — ECONOMIC STABILITY: FOOD INSECURITY: WITHIN THE PAST 12 MONTHS, THE FOOD YOU BOUGHT JUST DIDN'T LAST AND YOU DIDN'T HAVE MONEY TO GET MORE.: NEVER TRUE

## 2022-11-17 SDOH — ECONOMIC STABILITY: FOOD INSECURITY: WITHIN THE PAST 12 MONTHS, YOU WORRIED THAT YOUR FOOD WOULD RUN OUT BEFORE YOU GOT MONEY TO BUY MORE.: NEVER TRUE

## 2022-11-17 ASSESSMENT — SOCIAL DETERMINANTS OF HEALTH (SDOH): HOW HARD IS IT FOR YOU TO PAY FOR THE VERY BASICS LIKE FOOD, HOUSING, MEDICAL CARE, AND HEATING?: NOT HARD AT ALL

## 2022-11-17 NOTE — PATIENT INSTRUCTIONS
New Updates for [facility] MyChart/ [employer] ([facility]) CASANDRA    Thank you for choosing US to give you the best care! [employer] ([facility]) is always trying to think of new ways to help their patients. We are asking all patients to try out the new digital registration that is now available through your [employer] account or the new CASANDRA, [employer] ([facility]). Via the casandra you're now able to update your personal and registration information prior to your upcoming appointment. This will save you time once you arrive at the office to check-in, not to mention your information remains safe!! Many other perks come from signing up for an account, such as:  Requesting refills  Scheduling an appointment  Completing an E-Visit  Sending a message to the office/provider  Having access to your medication list  Paying your bill/copay prior to your appointment  Scheduling your yearly mammogram  Review your test results    If you are not familiar with [employer] or the [employer] ([facility]) CASANDRA, please ask one of us and we will be happy to answer any questions or help you set-up your account.       Your [facility] office,  [place]

## 2022-11-17 NOTE — PROGRESS NOTES
Visit Information    Have you changed or started any medications since your last visit including any over-the-counter medicines, vitamins, or herbal medicines? no   Have you stopped taking any of your medications? Is so, why? -  no  Are you having any side effects from any of your medications? - no    Have you seen any other physician or provider since your last visit?  no   Have you had any other diagnostic tests since your last visit?  no   Have you been seen in the emergency room and/or had an admission in a hospital since we last saw you?  no   Have you had your routine dental cleaning in the past 6 months?  no     Do you have an active MyChart account? If no, what is the barrier?   Yes    Patient Care Team:  ZAK Thompson CNP as PCP - General (Family Medicine)  ZAK Thompson CNP as PCP - [facility] [place] Provider  Christine Carrasquillo DO as Consulting Physician (Obstetrics & Gynecology)    Medical History Review  Past Medical, Family, and Social History reviewed and does not contribute to the patient presenting condition    Health Maintenance   Topic Date Due    Diabetic foot exam  Never done    Diabetic retinal exam  Never done    Hepatitis B vaccine (1 of 3 - Risk 3-dose series) Never done    DTaP/Tdap/Td vaccine (1 - Tdap) Never done    Shingles vaccine (1 of 2) Never done    COVID-19 Vaccine (3 - Booster for Moderna series) 07/16/2021    Lipids  07/13/2022    Flu vaccine (1) Never done    Low dose CT lung screening  11/01/2022    Diabetic microalbuminuria test  11/01/2022    A1C test (Diabetic or Prediabetic)  06/27/2023    Depression Screen  06/27/2023    Breast cancer screen  07/14/2023    Colorectal Cancer Screen  08/27/2025    Pneumococcal 0-64 years Vaccine  Completed    Hepatitis C screen  Completed    HIV screen  Completed    Hepatitis A vaccine  Aged Out    Hib vaccine  Aged Out    Meningococcal (ACWY) vaccine  Aged Out

## 2022-11-28 ENCOUNTER — HOSPITAL ENCOUNTER (OUTPATIENT)
Age: 57
Discharge: HOME | End: 2022-11-28
Payer: COMMERCIAL

## 2022-11-28 DIAGNOSIS — I25.118 CORONARY ARTERY DISEASE OF NATIVE ARTERY OF NATIVE HEART WITH STABLE ANGINA PECTORIS (HCC): ICD-10-CM

## 2022-11-28 DIAGNOSIS — E78.2 MIXED HYPERLIPIDEMIA: ICD-10-CM

## 2022-11-28 DIAGNOSIS — E55.9 VITAMIN D DEFICIENCY: ICD-10-CM

## 2022-11-28 DIAGNOSIS — E11.65 TYPE 2 DIABETES MELLITUS WITH HYPERGLYCEMIA, WITHOUT LONG-TERM CURRENT USE OF INSULIN (HCC): ICD-10-CM

## 2022-11-28 LAB
ABSOLUTE EOS #: 0.2 K/UL (ref 0–0.4)
ABSOLUTE LYMPH #: 3.2 K/UL (ref 1–4.8)
ABSOLUTE MONO #: 0.7 K/UL (ref 0.1–1.3)
ALBUMIN SERPL-MCNC: 4 G/DL (ref 3.5–5.2)
ALP BLD-CCNC: 107 U/L (ref 35–104)
ALT SERPL-CCNC: 12 U/L (ref 5–33)
ANION GAP SERPL CALCULATED.3IONS-SCNC: 10 MMOL/L (ref 9–17)
AST SERPL-CCNC: 13 U/L
BASOPHILS # BLD: 1 % (ref 0–2)
BASOPHILS ABSOLUTE: 0 K/UL (ref 0–0.2)
BILIRUB SERPL-MCNC: 0.3 MG/DL (ref 0.3–1.2)
BUN BLDV-MCNC: 11 MG/DL (ref 6–20)
CALCIUM SERPL-MCNC: 9.1 MG/DL (ref 8.6–10.4)
CHLORIDE BLD-SCNC: 106 MMOL/L (ref 98–107)
CHOLESTEROL, FASTING: 131 MG/DL
CHOLESTEROL/HDL RATIO: 4
CO2: 28 MMOL/L (ref 20–31)
CREAT SERPL-MCNC: 0.65 MG/DL (ref 0.5–0.9)
EOSINOPHILS RELATIVE PERCENT: 3 % (ref 0–4)
GFR SERPL CREATININE-BSD FRML MDRD: >60 ML/MIN/1.73M2
GLUCOSE FASTING: 135 MG/DL (ref 70–99)
HCT VFR BLD CALC: 38.1 % (ref 36–46)
HDLC SERPL-MCNC: 33 MG/DL
HEMOGLOBIN: 12.8 G/DL (ref 12–16)
LDL CHOLESTEROL: 76 MG/DL (ref 0–130)
LYMPHOCYTES # BLD: 34 % (ref 24–44)
MCH RBC QN AUTO: 28 PG (ref 26–34)
MCHC RBC AUTO-ENTMCNC: 33.5 G/DL (ref 31–37)
MCV RBC AUTO: 83.6 FL (ref 80–100)
MONOCYTES # BLD: 7 % (ref 1–7)
PDW BLD-RTO: 14.4 % (ref 11.5–14.9)
PLATELET # BLD: 177 K/UL (ref 150–450)
PMV BLD AUTO: 8.5 FL (ref 6–12)
POTASSIUM SERPL-SCNC: 3.9 MMOL/L (ref 3.7–5.3)
RBC # BLD: 4.56 M/UL (ref 4–5.2)
SEG NEUTROPHILS: 55 % (ref 36–66)
SEGMENTED NEUTROPHILS ABSOLUTE COUNT: 5.2 K/UL (ref 1.3–9.1)
SODIUM BLD-SCNC: 144 MMOL/L (ref 135–144)
TOTAL PROTEIN: 6.9 G/DL (ref 6.4–8.3)
TRIGLYCERIDE, FASTING: 111 MG/DL
VITAMIN D 25-HYDROXY: 10.3 NG/ML
WBC # BLD: 9.3 K/UL (ref 3.5–11)

## 2022-11-28 PROCEDURE — 80053 COMPREHEN METABOLIC PANEL: CPT

## 2022-11-28 PROCEDURE — 85025 COMPLETE CBC W/AUTO DIFF WBC: CPT

## 2022-11-28 PROCEDURE — 82306 VITAMIN D 25 HYDROXY: CPT

## 2022-11-28 PROCEDURE — 80061 LIPID PANEL: CPT

## 2022-12-27 DIAGNOSIS — E11.65 TYPE 2 DIABETES MELLITUS WITH HYPERGLYCEMIA, WITHOUT LONG-TERM CURRENT USE OF INSULIN (HCC): ICD-10-CM

## 2022-12-27 RX ORDER — METFORMIN HYDROCHLORIDE 500 MG/1
TABLET, EXTENDED RELEASE ORAL
Qty: 30 TABLET | Refills: 0 | Status: SHIPPED | OUTPATIENT
Start: 2022-12-27

## 2022-12-27 NOTE — TELEPHONE ENCOUNTER
Please Approve or Refuse.   Send to Pharmacy per Pt's Request: [facility]     Next Visit Date:  3/17/2023   Last Visit Date: 11/17/2022    Hemoglobin A1C (%)   Date Value   11/17/2022 6.5   06/27/2022 6.2   02/25/2022 6.2             ( goal A1C is < 7)   BP Readings from Last 3 Encounters:   11/17/22 (!) 140/90   06/27/22 120/80   02/25/22 112/80          (goal 120/80)  BUN   Date Value Ref Range Status   11/28/2022 11 6 - 20 mg/dL Final     Creatinine   Date Value Ref Range Status   11/28/2022 0.65 0.50 - 0.90 mg/dL Final     Potassium   Date Value Ref Range Status   11/28/2022 3.9 3.7 - 5.3 mmol/L Final

## 2023-01-15 ENCOUNTER — HOSPITAL ENCOUNTER (OUTPATIENT)
Age: 58
Setting detail: OBSERVATION
Discharge: HOME | End: 2023-01-17
Attending: EMERGENCY MEDICINE | Admitting: EMERGENCY MEDICINE
Payer: COMMERCIAL

## 2023-01-15 ENCOUNTER — APPOINTMENT (OUTPATIENT)
Dept: GENERAL RADIOLOGY | Age: 58
End: 2023-01-15
Payer: COMMERCIAL

## 2023-01-15 DIAGNOSIS — J44.9 MILD CHRONIC OBSTRUCTIVE PULMONARY DISEASE (HCC): ICD-10-CM

## 2023-01-15 DIAGNOSIS — I10 ESSENTIAL HYPERTENSION: ICD-10-CM

## 2023-01-15 DIAGNOSIS — K21.9 GASTROESOPHAGEAL REFLUX DISEASE WITHOUT ESOPHAGITIS: ICD-10-CM

## 2023-01-15 DIAGNOSIS — R07.9 CHEST PAIN, UNSPECIFIED TYPE: Primary | ICD-10-CM

## 2023-01-15 LAB
ABSOLUTE EOS #: 0.31 K/UL (ref 0–0.44)
ABSOLUTE IMMATURE GRANULOCYTE: <0.03 K/UL (ref 0–0.3)
ABSOLUTE LYMPH #: 4.37 K/UL (ref 1.1–3.7)
ABSOLUTE MONO #: 0.87 K/UL (ref 0.1–1.2)
ALBUMIN SERPL-MCNC: 3.9 G/DL (ref 3.5–5.2)
ALBUMIN/GLOBULIN RATIO: 1.2 (ref 1–2.5)
ALP BLD-CCNC: 105 U/L (ref 35–104)
ALT SERPL-CCNC: 16 U/L (ref 5–33)
ANION GAP SERPL CALCULATED.3IONS-SCNC: 13 MMOL/L (ref 9–17)
AST SERPL-CCNC: 18 U/L
BASOPHILS # BLD: 1 % (ref 0–2)
BASOPHILS ABSOLUTE: 0.08 K/UL (ref 0–0.2)
BILIRUB SERPL-MCNC: 0.2 MG/DL (ref 0.3–1.2)
BILIRUBIN DIRECT: <0.1 MG/DL
BILIRUBIN, INDIRECT: ABNORMAL MG/DL (ref 0–1)
BUN BLDV-MCNC: 12 MG/DL (ref 6–20)
CALCIUM SERPL-MCNC: 9 MG/DL (ref 8.6–10.4)
CHLORIDE BLD-SCNC: 104 MMOL/L (ref 98–107)
CO2: 25 MMOL/L (ref 20–31)
CREAT SERPL-MCNC: 0.66 MG/DL (ref 0.5–0.9)
D-DIMER QUANTITATIVE: 0.32 MG/L FEU
EOSINOPHILS RELATIVE PERCENT: 3 % (ref 1–4)
GFR SERPL CREATININE-BSD FRML MDRD: >60 ML/MIN/1.73M2
GLUCOSE BLD-MCNC: 102 MG/DL (ref 70–99)
HCT VFR BLD CALC: 41.3 % (ref 36.3–47.1)
HEMOGLOBIN: 13.2 G/DL (ref 11.9–15.1)
IMMATURE GRANULOCYTES: 0 %
LIPASE: 36 U/L (ref 13–60)
LYMPHOCYTES # BLD: 40 % (ref 24–43)
MCH RBC QN AUTO: 27.8 PG (ref 25.2–33.5)
MCHC RBC AUTO-ENTMCNC: 32 G/DL (ref 28.4–34.8)
MCV RBC AUTO: 86.9 FL (ref 82.6–102.9)
MONOCYTES # BLD: 8 % (ref 3–12)
NRBC AUTOMATED: 0 PER 100 WBC
PDW BLD-RTO: 13.2 % (ref 11.8–14.4)
PLATELET # BLD: 216 K/UL (ref 138–453)
PMV BLD AUTO: 11.4 FL (ref 8.1–13.5)
POTASSIUM SERPL-SCNC: 3.8 MMOL/L (ref 3.7–5.3)
RBC # BLD: 4.75 M/UL (ref 3.95–5.11)
SARS-COV-2, RAPID: NOT DETECTED
SEG NEUTROPHILS: 48 % (ref 36–65)
SEGMENTED NEUTROPHILS ABSOLUTE COUNT: 5.37 K/UL (ref 1.5–8.1)
SODIUM BLD-SCNC: 142 MMOL/L (ref 135–144)
SPECIMEN DESCRIPTION: NORMAL
TOTAL PROTEIN: 7.1 G/DL (ref 6.4–8.3)
TROPONIN, HIGH SENSITIVITY: <6 NG/L (ref 0–14)
TROPONIN, HIGH SENSITIVITY: <6 NG/L (ref 0–14)
WBC # BLD: 11 K/UL (ref 3.5–11.3)

## 2023-01-15 PROCEDURE — 96374 THER/PROPH/DIAG INJ IV PUSH: CPT

## 2023-01-15 PROCEDURE — 6360000002 HC RX W HCPCS: Performed by: STUDENT IN AN ORGANIZED HEALTH CARE EDUCATION/TRAINING PROGRAM

## 2023-01-15 PROCEDURE — 85025 COMPLETE CBC W/AUTO DIFF WBC: CPT

## 2023-01-15 PROCEDURE — 80048 BASIC METABOLIC PNL TOTAL CA: CPT

## 2023-01-15 PROCEDURE — 99285 EMERGENCY DEPT VISIT HI MDM: CPT

## 2023-01-15 PROCEDURE — 84484 ASSAY OF TROPONIN QUANT: CPT

## 2023-01-15 PROCEDURE — 87635 SARS-COV-2 COVID-19 AMP PRB: CPT

## 2023-01-15 PROCEDURE — 85379 FIBRIN DEGRADATION QUANT: CPT

## 2023-01-15 PROCEDURE — 80076 HEPATIC FUNCTION PANEL: CPT

## 2023-01-15 PROCEDURE — 83690 ASSAY OF LIPASE: CPT

## 2023-01-15 PROCEDURE — 6370000000 HC RX 637 (ALT 250 FOR IP): Performed by: STUDENT IN AN ORGANIZED HEALTH CARE EDUCATION/TRAINING PROGRAM

## 2023-01-15 PROCEDURE — 93005 ELECTROCARDIOGRAM TRACING: CPT | Performed by: STUDENT IN AN ORGANIZED HEALTH CARE EDUCATION/TRAINING PROGRAM

## 2023-01-15 PROCEDURE — 71045 X-RAY EXAM CHEST 1 VIEW: CPT

## 2023-01-15 PROCEDURE — G0378 HOSPITAL OBSERVATION PER HR: HCPCS

## 2023-01-15 RX ORDER — ASPIRIN 81 MG/1
324 TABLET, CHEWABLE ORAL ONCE
Status: COMPLETED | OUTPATIENT
Start: 2023-01-15 | End: 2023-01-15

## 2023-01-15 RX ORDER — KETOROLAC TROMETHAMINE 15 MG/ML
15 INJECTION, SOLUTION INTRAMUSCULAR; INTRAVENOUS ONCE
Status: COMPLETED | OUTPATIENT
Start: 2023-01-15 | End: 2023-01-15

## 2023-01-15 RX ADMIN — KETOROLAC TROMETHAMINE 15 MG: 15 INJECTION, SOLUTION INTRAMUSCULAR; INTRAVENOUS at 19:19

## 2023-01-15 RX ADMIN — ASPIRIN 324 MG: 81 TABLET, CHEWABLE ORAL at 17:52

## 2023-01-15 ASSESSMENT — ENCOUNTER SYMPTOMS
COUGH: 1
VOMITING: 0
ABDOMINAL PAIN: 1
BACK PAIN: 0
SHORTNESS OF BREATH: 1
CHEST TIGHTNESS: 1
NAUSEA: 1
SORE THROAT: 0
WHEEZING: 0

## 2023-01-15 ASSESSMENT — HEART SCORE: ECG: 0

## 2023-01-15 ASSESSMENT — PAIN SCALES - GENERAL
PAINLEVEL_OUTOF10: 7
PAINLEVEL_OUTOF10: 6

## 2023-01-15 ASSESSMENT — PAIN DESCRIPTION - LOCATION: LOCATION: CHEST

## 2023-01-15 ASSESSMENT — PAIN - FUNCTIONAL ASSESSMENT: PAIN_FUNCTIONAL_ASSESSMENT: 0-10

## 2023-01-15 ASSESSMENT — PAIN DESCRIPTION - ORIENTATION: ORIENTATION: LEFT

## 2023-01-15 NOTE — ED NOTES
The following labs were labeled with appropriate pt sticker and tubed to lab:     [x] Blue     [x] Lavender   [] on ice  [x] Green/yellow  [x] Green/black [] on ice  [] Wayna Neth  [] on ice  [] Yellow  [x] Red  [] Type/ Screen  [] ABG  [] VBG    [] COVID-19 swab    [] Rapid  [] PCR  [] Flu swab  [] Peds Viral Panel     [] Urine Sample  [] Fecal Sample  [] Pelvic Cultures  [] Blood Cultures  [] X 2  [] STREP Cultures     Galina Santamaria RN  01/15/23 1619

## 2023-01-15 NOTE — ED NOTES
Pt to ed from home, c/o chest pain left sided since yesterday, reports sob, out of albuterol and inhalers. Reports nausea without vomiting. Patient is aox4, reports covid last month and recovered, has cough and coughing blood everyday with hx of COPD. Reports this is normal for her.       Sharyn Arredondo RN  01/15/23 0212

## 2023-01-15 NOTE — ED PROVIDER NOTES
[facility] ED  Emergency Department Encounter  Emergency Medicine Resident     Pt Name:Amy Gonsalez  MRN: [id number]  [place] 1965  Date of evaluation: 1/15/23  PCP:  ZAK Diggs CNP  Note Started: 5:30 PM EST      CHIEF COMPLAINT       Chief Complaint   Patient presents with    Shortness of Breath    Nausea    Chest Pain       HISTORY OF PRESENT ILLNESS  (Location/Symptom, Timing/Onset, Context/Setting, Quality, Duration, Modifying Factors, Severity.)      Henrik Quevedo is a 61 y.o. female who presents with acute onset left-sided chest pain that began yesterday. States pain is coming and going initially described as midsternal but now feels that is over her left nipple. It is associated with shortness of breath she has a history of COPD and a chronic cough states the cough is worsening. She does have a history of CAD with a stent placement 1 year prior and is on Plavix. No history of blood clots. Also associated with some nausea without vomiting, however no diaphoresis. Recent diagnosis of COVID 1 month prior. DAMI placed 2021 proximal RCA  PAST MEDICAL / SURGICAL / SOCIAL / FAMILY HISTORY      has a past medical history of Arthritis, Chronic midline low back pain without sciatica, Coronary artery disease of native artery of native heart with stable angina pectoris ([address] Essential hypertension, Gastroesophageal reflux disease without esophagitis, H/O vaginal hysterectomy, Mild chronic obstructive pulmonary disease ([address] Mixed hyperlipidemia, Tobacco abuse, Type 2 diabetes mellitus with hyperglycemia, without long-term current use of insulin ([address] and Vision abnormalities. has a past surgical history that includes Tonsillectomy;  section; Hysterectomy; hernia repair (Right); Breast surgery (Left); Colonoscopy (8/27/15);  Breast biopsy; and Ovary removal.      Social History     Socioeconomic History    Marital status:      Spouse name: Not on file    Number of children: Not on file    Years of education: Not on file    Highest education level: Not on file   Occupational History    Not on file   Tobacco Use    Smoking status: Some Days     Packs/day: 1.00     Years: 38.00     Pack years: 38.00     Types: Cigarettes     Start date: 1982    Smokeless tobacco: Never   Substance and Sexual Activity    Alcohol use: Yes     Alcohol/week: 0.0 standard drinks     Comment: OCC    Drug use: No    Sexual activity: Yes     Partners: Male     Birth control/protection: Surgical     Comment: hyst   Other Topics Concern    Not on file   Social History Narrative    Not on file     Social Determinants of Health     Financial Resource Strain: Low Risk     Difficulty of Paying Living Expenses: Not hard at all   Food Insecurity: No Food Insecurity    Worried About Running Out of Food in the Last Year: Never true    Ran Out of Food in the Last Year: Never true   Transportation Needs: Not on file   Physical Activity: Not on file   Stress: Not on file   Social Connections: Not on file   Intimate Partner Violence: Not on file   Housing Stability: Not on file       Family History   Problem Relation Age of Onset    Stroke Mother         3x    Hypertension Brother     High Cholesterol Brother        Allergies:  Patient has no known allergies. Home Medications:  Prior to Admission medications    Medication Sig Start Date End Date Taking? Authorizing Provider   metFORMIN (GLUCOPHAGE-XR) 500 MG extended release tablet Take 1 tablet by mouth once daily with breakfast 12/27/22   ZAK Barker CNP   gabapentin (NEURONTIN) 100 MG capsule Take 2 capsules by mouth 3 times daily for 30 days.  Intended supply: 30 days 11/17/22 12/17/22  ZAK Barker CNP   butalbital-acetaminophen-caffeine (FIORICET, ESGIC) -40 MG per tablet Take 1 tablet by mouth every 6 hours as needed for Headaches  Patient not taking: Reported on 1/15/2023 11/17/22   ZAK Currie - CNP   budesonide-formoterol (SYMBICORT) 160-4.5 MCG/ACT AERO Inhale 2 puffs into the lungs 2 times daily 11/17/22 12/17/22  ZAK Barker CNP   albuterol sulfate HFA (PROVENTIL;VENTOLIN;PROAIR) 108 (90 Base) MCG/ACT inhaler Inhale 2 puffs into the lungs every 6 hours as needed for Wheezing or Shortness of Breath 11/17/22 12/17/22  ZAK Barker CNP   buPROPion (WELLBUTRIN XL) 150 MG extended release tablet Take 1 tablet by mouth every morning 11/17/22   ZAK Barker CNP   atorvastatin (LIPITOR) 40 MG tablet Take 1 tablet by mouth nightly 11/17/22   ZAK Barker CNP   metoprolol tartrate (LOPRESSOR) 25 MG tablet Take 1 tablet by mouth 2 times daily 11/17/22   ZAK Barker CNP   baclofen (LIORESAL) 10 MG tablet Take 1 tablet by mouth 3 times daily as needed (stiffness)  Patient not taking: Reported on 1/15/2023 11/17/22   ZAK Barker CNP   acetaminophen (TYLENOL) 500 MG tablet Take 1 tablet by mouth every 6 hours as needed for Pain (Take 1-2 tablets every 6 hours) 11/17/22 12/17/22  ZAK Barker CNP   famotidine (PEPCID) 20 MG tablet Take 1 tablet by mouth 2 times daily  Patient not taking: Reported on 1/15/2023 11/17/22   ZAK Barker CNP   losartan (COZAAR) 25 MG tablet Take 1 tablet by mouth daily  Patient not taking: Reported on 1/15/2023 11/17/22   ZAK Barker CNP   Handicap Placard MISC by Does not apply route Expires July 1, 2027 7/1/22   ZAK Barker CNP   clopidogrel (PLAVIX) 75 MG tablet Take 75 mg by mouth daily    Historical Provider, MD   Blood Glucose Monitoring Suppl (BLOOD GLUCOSE MONITOR SYSTEM) w/Device KIT Use as directed. Check glucose levels FASTING DAILY and as needed 7/26/21   Hansa Mahajan MD   blood glucose monitor strips Test FASTING DAILY . BRAND OF CHOICE INSURANCE ALLOWS. 7/26/21   Hansa Mahajan MD   Alcohol Swabs (ALCOHOL PREP) PADS Use as directed 7/14/21   ZAK Barker CNP   Lancets MISC 1 each by Does not apply route 2 times daily 7/14/21   ZAK Barker CNP   aspirin 81 MG EC tablet Take 1 tablet by mouth daily  Patient not taking: Reported on 1/15/2023 7/3/21   ZAK Gaston CNP         REVIEW OF SYSTEMS       Review of Systems   Constitutional:  Negative for fatigue and fever. HENT:  Negative for congestion and sore throat. Respiratory:  Positive for cough, chest tightness and shortness of breath. Negative for wheezing. Cardiovascular:  Positive for chest pain. Negative for palpitations and leg swelling. Gastrointestinal:  Positive for abdominal pain and nausea. Negative for vomiting. Musculoskeletal:  Negative for back pain and neck pain. Skin:  Negative for pallor. Neurological:  Negative for dizziness, weakness and light-headedness. PHYSICAL EXAM      INITIAL VITALS:   BP (!) 176/83   Pulse 76   Temp 97.2 °F (36.2 °C) (Oral)   Resp 17   SpO2 96%     Physical Exam  Constitutional:       General: She is not in acute distress. Appearance: Normal appearance. She is not ill-appearing. HENT:      Head: Normocephalic. Nose: Nose normal.      Mouth/Throat:      Mouth: Mucous membranes are moist.      Pharynx: Oropharynx is clear. Eyes:      Pupils: Pupils are equal, round, and reactive to light. Cardiovascular:      Rate and Rhythm: Normal rate and regular rhythm. Pulses: Normal pulses. Heart sounds: Normal heart sounds. Pulmonary:      Effort: Pulmonary effort is normal.      Breath sounds: Normal breath sounds. No wheezing. Chest:      Chest wall: Tenderness present. Abdominal:      General: Abdomen is flat. Tenderness: There is abdominal tenderness. Comments: Epigastric, LUQ   Neurological:      Mental Status: She is alert.          DDX/DIAGNOSTIC RESULTS / EMERGENCY DEPARTMENT COURSE / MDM     Medical Decision Making  62-year-old female with CAD and stent placement presenting with acute onset left-sided chest pain that began yesterday. Given her heart history there is a concern for ACS. She also has a recent COVID diagnosis makes her potential hypercoagulable with a worsening chronic cough and shortness of breath concerning for pulmonary embolism. Pain is not constant and sharp it does not radiate into the back, pulses are equal in upper extremities low concern for aortic dissection at this point. Will obtain cardiac work-up including chest x-ray CBC BMP troponin x2, as well as a D-dimer as she is not PERC negative. Will give ASA 324mg. There is also tenderness to palpation to the epigastric and left upper quadrant region will obtain hepatic panel and lipase to evaluate for any abdominal pathology. Heart Score 3    Amount and/or Complexity of Data Reviewed  Independent Historian: spouse  External Data Reviewed: labs. Labs: ordered. Decision-making details documented in ED Course. Radiology: ordered. Decision-making details documented in ED Course. ECG/medicine tests: ordered. Decision-making details documented in ED Course.         EKG  Regular rate and rhythm  Normal axis  Normal intervals    No ST elevation or ST depression  Normal sinus rhythm    All EKG's are interpreted by the Emergency Department Physician who either signs or Co-signs this chart in the absence of a cardiologist.    EMERGENCY DEPARTMENT COURSE:      ED Course as of 01/15/23 1915   Sun Jeffy 15, 2023   1745 EKG Interpretation   Interpreted by Desmond Purcell DO    Rhythm: normal sinus   Rate: normal  Axis: normal  Ectopy: none  Conduction: normal  ST Segments: normal  T Waves: normal  Q Waves: none    Clinical Impression: no acute changes normal EKG     [WK]   1824 D-Dimer, Quant: 0.32  Pulmonary embolism can be excluded [QC]   1824 Troponin, High Sensitivity: <6 [QC]   1906 Shared decision making with patient and spouse, pt agreeable to observation admission with Heart score of 4. She is still having left sided chest pain on reassessment, vitals remained stable, she does not appear to be in distress. [QC]      ED Course User Index  [QC] Olivia Perdomo MD  [WK] Jack Mtz DO       PROCEDURES:      CONSULTS:  IP CONSULT TO CARDIOLOGY    CRITICAL CARE:  There was significant risk of life threatening deterioration of patient's condition requiring my direct management. Critical care time 0 minutes, excluding any documented procedures. FINAL IMPRESSION      1. Chest pain, unspecified type          DISPOSITION / PLAN     DISPOSITION Admitted 01/15/2023 07:09:39 PM      PATIENT REFERRED TO:  No follow-up provider specified.     DISCHARGE MEDICATIONS:  New Prescriptions    No medications on file       Olivia Perdomo MD  Emergency Medicine Resident    (Please note that portions of thisnote were completed with a voice recognition program.  Efforts were made to edit the dictations but occasionally words are mis-transcribed.)      Olivia Perdomo MD  Resident  01/15/23 5858

## 2023-01-15 NOTE — ED PROVIDER NOTES
[facility]     Emergency Department     Faculty Note/ Attestation      Pt Name: Rika Nguyen                                       MRN: [id number]  [place] 1965  Date of evaluation: 1/15/2023    Patients PCP:    ZAK Davis - CNP    Attestation  I performed a history and physical examination of the patient and discussed management with the resident. I reviewed the residents note and agree with the documented findings and plan of care. Any areas of disagreement are noted on the chart. I was personally present for the key portions of any procedures. I have documented in the chart those procedures where I was not present during the key portions. I have reviewed the emergency nurses triage note. I agree with the chief complaint, past medical history, past surgical history, allergies, medications, social and family history as documented unless otherwise noted below. For Physician Assistant/ Nurse Practitioner cases/documentation I have personally evaluated this patient and have completed at least one if not all key elements of the E/M (history, physical exam, and MDM). Additional findings are as noted.     Initial Screens:     Heart Score for chest pain patients  History: Slightly Suspicious  ECG: Normal  Patient Age: > 39 and < 65 years  *Risk factors for Atherosclerotic disease: Diabetes Mellitus, Hypercholesterolemia, Hypertension, Coronary Artery Disease  Risk Factors: > 3 Risk factors or history of atherosclerotic disease*  Troponin: < 1X normal limit  Heart Score Total: 3  [place] Coma Scale  Eye Opening: Spontaneous  Best Verbal Response: Oriented  Best Motor Response: Obeys commands  [place] Coma Scale Score: 15    Vitals:    Vitals:    01/15/23 1727 01/15/23 1737   BP: (!) 147/80 (!) 176/83   Pulse: 73 76   Resp: 18 17   Temp: 97.2 °F (36.2 °C)    TempSrc: Oral    SpO2: 97% 96%       CHIEF COMPLAINT       Chief Complaint   Patient presents with    Shortness of Breath    Nausea     The pt is a 60 YO with L sided cp started yesterday comes and goes no sweating n/v or diarrhea having epigastric pain as well. Shortness of breath. EMERGENCY DEPARTMENT COURSE:     -------------------------  BP: (!) 176/83, Temp: 97.2 °F (36.2 °C), Heart Rate: 76, Resp: 17  Abdomen is soft nondistended nontender no fevers patient alert oriented x4 breathing without any acute distress    Comments  Medical Decision Making  Amount and/or Complexity of Data Reviewed  Labs: ordered. Decision-making details documented in ED Course. Radiology: ordered. ECG/medicine tests: ordered. Risk  OTC drugs. Prescription drug management. Decision regarding hospitalization.     The patient presented with a complaint of shortness of breath which could be considered to be caused by:  - Upper airway causes: angioedema, anaphylaxis, infections or FB of the upper airway  - Pulmonary causes: COPD/Asthma/Emphysema, Pneumonia, pneumothorax, pulmonary edema, pleural effusion or pulmonary embolism  - Cardiac causes: Heart failure, ACS, cardiac arrhythmias  - Toxins: Cyanide, Carbon monoxide, drugs, DKA  - Neurologic causes: Guillain-Barré , stroke    Based on history and physical exam the patient is unlikely to suffer from upper airway cause patient has no stridor Sterner or any signs of foreign body patient has no focal deficits or ascending weakness concerning for neurologic causes patient is a diabetic type II not type I DKA extremely unlikely patient family members do not have same symptoms and carbon monoxide or other drugs are unlikely cardiac causes likely ACS is being evaluated with troponins and EKG patient also highly concerning for pulmonary causes including embolism patient will have D-dimer and obtain x-rays to further evaluate pneumonia pneumothorax lungs are clear to auscultation at this time    ED Course as of 01/15/23 2344   Sun Jeffy 15, 2023   7352 EKG Interpretation   Interpreted by Chris Hickman Aurdey HART    Rhythm: normal sinus   Rate: normal  Axis: normal  Ectopy: none  Conduction: normal  ST Segments: normal  T Waves: normal  Q Waves: none    Clinical Impression: no acute changes normal EKG     [WK]   1824 D-Dimer, Quant: 0.32  Pulmonary embolism can be excluded [QC]   1824 Troponin, High Sensitivity: <6 [QC]   1906 Shared decision making with patient and spouse, pt agreeable to observation admission with Heart score of 4. She is still having left sided chest pain on reassessment, vitals remained stable, she does not appear to be in distress. [QC]      ED Course User Index  [QC] Olivia Perdomo MD  [WK] Jack Mtz DO   The patient was re evaluated for chest pain. They currently have improved pain, vitals are stable. Heart Score = 4 (0-3 is Low Risk)  History   Highly suspicious -- +2   Moderately suspicious -- +1   Slightly suspicious -- 0     EKG   Significant ST depression -- +2   Non specific repolarisation disturbance -- +1   Normal -- 0     Age   ? 65 -- +2   45-65 -- +1   ? 45 -- 0    Risk Factors   Risk factors include:   Hypercholesterolemia   Hypertension   Diabetes Mellitus   Cigarette smoking   Positive family history   Obesity  ? 3 risk factors or history of atherosclerotic disease -- +2   1-2 risk factors  -- +1   No risk factors known -- 0     Troponin   ? 3× normal limit -- +2   1-3× normal limit -- +1    0 ? normal limit -- 0    *The HEART Score is a propspectively studied scoring system to help emergency physicians risk-stratifiy chest pain patients who are at risk for all-cause mortality, myocardial infarction, or coronary revascularization in the next 6 weeks. Low risk patients have a score 0-3 and have a less than 2% risk of major event at 6 weeks. Geovanny Kauffman DO,, , RDMS.   Attending Emergency Physician          Jack Mtz DO  01/15/23 4598

## 2023-01-16 ENCOUNTER — APPOINTMENT (OUTPATIENT)
Dept: NUCLEAR MEDICINE | Age: 58
End: 2023-01-16
Payer: COMMERCIAL

## 2023-01-16 LAB
ABSOLUTE EOS #: 0.25 K/UL (ref 0–0.44)
ABSOLUTE IMMATURE GRANULOCYTE: 0.03 K/UL (ref 0–0.3)
ABSOLUTE LYMPH #: 3.28 K/UL (ref 1.1–3.7)
ABSOLUTE MONO #: 0.61 K/UL (ref 0.1–1.2)
ANION GAP SERPL CALCULATED.3IONS-SCNC: 9 MMOL/L (ref 9–17)
BASOPHILS # BLD: 1 % (ref 0–2)
BASOPHILS ABSOLUTE: 0.05 K/UL (ref 0–0.2)
BUN BLDV-MCNC: 12 MG/DL (ref 6–20)
CALCIUM SERPL-MCNC: 8.5 MG/DL (ref 8.6–10.4)
CHLORIDE BLD-SCNC: 108 MMOL/L (ref 98–107)
CO2: 25 MMOL/L (ref 20–31)
CREAT SERPL-MCNC: 0.61 MG/DL (ref 0.5–0.9)
EKG ATRIAL RATE: 55 BPM
EKG ATRIAL RATE: 74 BPM
EKG P AXIS: 22 DEGREES
EKG P AXIS: 41 DEGREES
EKG P-R INTERVAL: 172 MS
EKG P-R INTERVAL: 176 MS
EKG Q-T INTERVAL: 406 MS
EKG Q-T INTERVAL: 446 MS
EKG QRS DURATION: 76 MS
EKG QRS DURATION: 78 MS
EKG QTC CALCULATION (BAZETT): 426 MS
EKG QTC CALCULATION (BAZETT): 450 MS
EKG R AXIS: 28 DEGREES
EKG R AXIS: 37 DEGREES
EKG T AXIS: 54 DEGREES
EKG T AXIS: 63 DEGREES
EKG VENTRICULAR RATE: 55 BPM
EKG VENTRICULAR RATE: 74 BPM
EOSINOPHILS RELATIVE PERCENT: 3 % (ref 1–4)
GFR SERPL CREATININE-BSD FRML MDRD: >60 ML/MIN/1.73M2
GLUCOSE BLD-MCNC: 114 MG/DL (ref 70–99)
HCT VFR BLD CALC: 39.4 % (ref 36.3–47.1)
HEMOGLOBIN: 12.3 G/DL (ref 11.9–15.1)
IMMATURE GRANULOCYTES: 0 %
LV EF: 63 %
LVEF MODALITY: NORMAL
LYMPHOCYTES # BLD: 40 % (ref 24–43)
MCH RBC QN AUTO: 27.6 PG (ref 25.2–33.5)
MCHC RBC AUTO-ENTMCNC: 31.2 G/DL (ref 28.4–34.8)
MCV RBC AUTO: 88.5 FL (ref 82.6–102.9)
MONOCYTES # BLD: 8 % (ref 3–12)
NRBC AUTOMATED: 0 PER 100 WBC
PDW BLD-RTO: 13.2 % (ref 11.8–14.4)
PLATELET # BLD: 181 K/UL (ref 138–453)
PMV BLD AUTO: 11.7 FL (ref 8.1–13.5)
POTASSIUM SERPL-SCNC: 3.9 MMOL/L (ref 3.7–5.3)
RBC # BLD: 4.45 M/UL (ref 3.95–5.11)
SEG NEUTROPHILS: 48 % (ref 36–65)
SEGMENTED NEUTROPHILS ABSOLUTE COUNT: 3.9 K/UL (ref 1.5–8.1)
SODIUM BLD-SCNC: 142 MMOL/L (ref 135–144)
WBC # BLD: 8.1 K/UL (ref 3.5–11.3)

## 2023-01-16 PROCEDURE — G0378 HOSPITAL OBSERVATION PER HR: HCPCS

## 2023-01-16 PROCEDURE — 93005 ELECTROCARDIOGRAM TRACING: CPT | Performed by: STUDENT IN AN ORGANIZED HEALTH CARE EDUCATION/TRAINING PROGRAM

## 2023-01-16 PROCEDURE — 2580000003 HC RX 258: Performed by: STUDENT IN AN ORGANIZED HEALTH CARE EDUCATION/TRAINING PROGRAM

## 2023-01-16 PROCEDURE — 85025 COMPLETE CBC W/AUTO DIFF WBC: CPT

## 2023-01-16 PROCEDURE — A9500 TC99M SESTAMIBI: HCPCS | Performed by: EMERGENCY MEDICINE

## 2023-01-16 PROCEDURE — 93017 CV STRESS TEST TRACING ONLY: CPT

## 2023-01-16 PROCEDURE — 6370000000 HC RX 637 (ALT 250 FOR IP): Performed by: STUDENT IN AN ORGANIZED HEALTH CARE EDUCATION/TRAINING PROGRAM

## 2023-01-16 PROCEDURE — 78452 HT MUSCLE IMAGE SPECT MULT: CPT

## 2023-01-16 PROCEDURE — 96376 TX/PRO/DX INJ SAME DRUG ADON: CPT

## 2023-01-16 PROCEDURE — 36415 COLL VENOUS BLD VENIPUNCTURE: CPT

## 2023-01-16 PROCEDURE — 80048 BASIC METABOLIC PNL TOTAL CA: CPT

## 2023-01-16 PROCEDURE — 6360000002 HC RX W HCPCS: Performed by: EMERGENCY MEDICINE

## 2023-01-16 PROCEDURE — 2580000003 HC RX 258: Performed by: EMERGENCY MEDICINE

## 2023-01-16 PROCEDURE — 3430000000 HC RX DIAGNOSTIC RADIOPHARMACEUTICAL: Performed by: EMERGENCY MEDICINE

## 2023-01-16 PROCEDURE — 6360000002 HC RX W HCPCS: Performed by: STUDENT IN AN ORGANIZED HEALTH CARE EDUCATION/TRAINING PROGRAM

## 2023-01-16 RX ORDER — CLOPIDOGREL BISULFATE 75 MG/1
75 TABLET ORAL DAILY
Status: DISCONTINUED | OUTPATIENT
Start: 2023-01-16 | End: 2023-01-17 | Stop reason: HOSPADM

## 2023-01-16 RX ORDER — BUPROPION HYDROCHLORIDE 150 MG/1
150 TABLET ORAL EVERY MORNING
Status: DISCONTINUED | OUTPATIENT
Start: 2023-01-16 | End: 2023-01-17 | Stop reason: HOSPADM

## 2023-01-16 RX ORDER — FAMOTIDINE 20 MG/1
20 TABLET, FILM COATED ORAL 2 TIMES DAILY
Status: DISCONTINUED | OUTPATIENT
Start: 2023-01-16 | End: 2023-01-17 | Stop reason: HOSPADM

## 2023-01-16 RX ORDER — KETOROLAC TROMETHAMINE 30 MG/ML
15 INJECTION, SOLUTION INTRAMUSCULAR; INTRAVENOUS ONCE
Status: COMPLETED | OUTPATIENT
Start: 2023-01-16 | End: 2023-01-16

## 2023-01-16 RX ORDER — TECHNETIUM TC-99M SESTAMIBI 1 MG/10ML
47.3 INJECTION INTRAVENOUS
Status: COMPLETED | OUTPATIENT
Start: 2023-01-16 | End: 2023-01-16

## 2023-01-16 RX ORDER — SODIUM CHLORIDE 0.9 % (FLUSH) 0.9 %
5-40 SYRINGE (ML) INJECTION EVERY 12 HOURS SCHEDULED
Status: DISCONTINUED | OUTPATIENT
Start: 2023-01-16 | End: 2023-01-17 | Stop reason: HOSPADM

## 2023-01-16 RX ORDER — ENOXAPARIN SODIUM 100 MG/ML
40 INJECTION SUBCUTANEOUS DAILY
Status: DISCONTINUED | OUTPATIENT
Start: 2023-01-16 | End: 2023-01-17 | Stop reason: HOSPADM

## 2023-01-16 RX ORDER — POTASSIUM CHLORIDE 7.45 MG/ML
10 INJECTION INTRAVENOUS PRN
Status: DISCONTINUED | OUTPATIENT
Start: 2023-01-16 | End: 2023-01-17 | Stop reason: HOSPADM

## 2023-01-16 RX ORDER — SODIUM CHLORIDE 0.9 % (FLUSH) 0.9 %
10 SYRINGE (ML) INJECTION PRN
Status: DISCONTINUED | OUTPATIENT
Start: 2023-01-16 | End: 2023-01-17 | Stop reason: HOSPADM

## 2023-01-16 RX ORDER — ONDANSETRON 2 MG/ML
4 INJECTION INTRAMUSCULAR; INTRAVENOUS EVERY 4 HOURS PRN
Status: DISCONTINUED | OUTPATIENT
Start: 2023-01-16 | End: 2023-01-17 | Stop reason: HOSPADM

## 2023-01-16 RX ORDER — SODIUM CHLORIDE 9 MG/ML
25 INJECTION, SOLUTION INTRAVENOUS PRN
Status: DISCONTINUED | OUTPATIENT
Start: 2023-01-16 | End: 2023-01-17 | Stop reason: HOSPADM

## 2023-01-16 RX ORDER — SODIUM CHLORIDE 9 MG/ML
INJECTION, SOLUTION INTRAVENOUS CONTINUOUS
Status: DISCONTINUED | OUTPATIENT
Start: 2023-01-16 | End: 2023-01-16

## 2023-01-16 RX ORDER — KETOROLAC TROMETHAMINE 30 MG/ML
15 INJECTION, SOLUTION INTRAMUSCULAR; INTRAVENOUS ONCE
Status: DISCONTINUED | OUTPATIENT
Start: 2023-01-16 | End: 2023-01-17 | Stop reason: HOSPADM

## 2023-01-16 RX ORDER — BUDESONIDE AND FORMOTEROL FUMARATE DIHYDRATE 160; 4.5 UG/1; UG/1
2 AEROSOL RESPIRATORY (INHALATION) 2 TIMES DAILY
Status: DISCONTINUED | OUTPATIENT
Start: 2023-01-16 | End: 2023-01-17 | Stop reason: HOSPADM

## 2023-01-16 RX ORDER — METFORMIN HYDROCHLORIDE 500 MG/1
500 TABLET, EXTENDED RELEASE ORAL
Status: DISCONTINUED | OUTPATIENT
Start: 2023-01-16 | End: 2023-01-17 | Stop reason: HOSPADM

## 2023-01-16 RX ORDER — ATROPINE SULFATE 0.1 MG/ML
0.5 INJECTION INTRAVENOUS EVERY 5 MIN PRN
Status: DISCONTINUED | OUTPATIENT
Start: 2023-01-16 | End: 2023-01-16

## 2023-01-16 RX ORDER — POLYETHYLENE GLYCOL 3350 17 G/17G
17 POWDER, FOR SOLUTION ORAL DAILY PRN
Status: DISCONTINUED | OUTPATIENT
Start: 2023-01-16 | End: 2023-01-17 | Stop reason: HOSPADM

## 2023-01-16 RX ORDER — SODIUM CHLORIDE 0.9 % (FLUSH) 0.9 %
5-40 SYRINGE (ML) INJECTION PRN
Status: ACTIVE | OUTPATIENT
Start: 2023-01-16 | End: 2023-01-16

## 2023-01-16 RX ORDER — ACETAMINOPHEN 325 MG/1
650 TABLET ORAL EVERY 6 HOURS PRN
Status: DISCONTINUED | OUTPATIENT
Start: 2023-01-16 | End: 2023-01-17 | Stop reason: HOSPADM

## 2023-01-16 RX ORDER — ATORVASTATIN CALCIUM 40 MG/1
40 TABLET, FILM COATED ORAL NIGHTLY
Status: DISCONTINUED | OUTPATIENT
Start: 2023-01-16 | End: 2023-01-17 | Stop reason: HOSPADM

## 2023-01-16 RX ORDER — ALBUTEROL SULFATE 90 UG/1
2 AEROSOL, METERED RESPIRATORY (INHALATION) PRN
Status: DISCONTINUED | OUTPATIENT
Start: 2023-01-16 | End: 2023-01-16

## 2023-01-16 RX ORDER — SODIUM CHLORIDE 0.9 % (FLUSH) 0.9 %
5-40 SYRINGE (ML) INJECTION PRN
Status: DISCONTINUED | OUTPATIENT
Start: 2023-01-16 | End: 2023-01-17 | Stop reason: HOSPADM

## 2023-01-16 RX ORDER — AMINOPHYLLINE DIHYDRATE 25 MG/ML
50 INJECTION, SOLUTION INTRAVENOUS PRN
Status: DISCONTINUED | OUTPATIENT
Start: 2023-01-16 | End: 2023-01-16

## 2023-01-16 RX ORDER — LOSARTAN POTASSIUM 25 MG/1
25 TABLET ORAL DAILY
Status: DISCONTINUED | OUTPATIENT
Start: 2023-01-16 | End: 2023-01-17 | Stop reason: HOSPADM

## 2023-01-16 RX ORDER — TECHNETIUM TC-99M SESTAMIBI 1 MG/10ML
14.2 INJECTION INTRAVENOUS
Status: COMPLETED | OUTPATIENT
Start: 2023-01-16 | End: 2023-01-16

## 2023-01-16 RX ORDER — ACETAMINOPHEN 650 MG/1
650 SUPPOSITORY RECTAL EVERY 6 HOURS PRN
Status: DISCONTINUED | OUTPATIENT
Start: 2023-01-16 | End: 2023-01-17 | Stop reason: HOSPADM

## 2023-01-16 RX ORDER — SODIUM CHLORIDE 9 MG/ML
500 INJECTION, SOLUTION INTRAVENOUS CONTINUOUS PRN
Status: DISCONTINUED | OUTPATIENT
Start: 2023-01-16 | End: 2023-01-16

## 2023-01-16 RX ORDER — POTASSIUM CHLORIDE 20 MEQ/1
40 TABLET, EXTENDED RELEASE ORAL PRN
Status: DISCONTINUED | OUTPATIENT
Start: 2023-01-16 | End: 2023-01-17 | Stop reason: HOSPADM

## 2023-01-16 RX ORDER — METOPROLOL TARTRATE 5 MG/5ML
5 INJECTION INTRAVENOUS EVERY 5 MIN PRN
Status: DISCONTINUED | OUTPATIENT
Start: 2023-01-16 | End: 2023-01-16

## 2023-01-16 RX ORDER — ASPIRIN 81 MG/1
81 TABLET ORAL DAILY
Status: DISCONTINUED | OUTPATIENT
Start: 2023-01-16 | End: 2023-01-17 | Stop reason: HOSPADM

## 2023-01-16 RX ORDER — ALBUTEROL SULFATE 90 UG/1
2 AEROSOL, METERED RESPIRATORY (INHALATION) EVERY 6 HOURS PRN
Status: DISCONTINUED | OUTPATIENT
Start: 2023-01-16 | End: 2023-01-17 | Stop reason: HOSPADM

## 2023-01-16 RX ORDER — NITROGLYCERIN 0.4 MG/1
0.4 TABLET SUBLINGUAL EVERY 5 MIN PRN
Status: DISCONTINUED | OUTPATIENT
Start: 2023-01-16 | End: 2023-01-16

## 2023-01-16 RX ADMIN — SODIUM CHLORIDE, PRESERVATIVE FREE 10 ML: 5 INJECTION INTRAVENOUS at 20:53

## 2023-01-16 RX ADMIN — FAMOTIDINE 20 MG: 20 TABLET, FILM COATED ORAL at 01:27

## 2023-01-16 RX ADMIN — CLOPIDOGREL 75 MG: 75 TABLET, FILM COATED ORAL at 08:24

## 2023-01-16 RX ADMIN — BUDESONIDE AND FORMOTEROL FUMARATE DIHYDRATE 2 PUFF: 160; 4.5 AEROSOL RESPIRATORY (INHALATION) at 18:24

## 2023-01-16 RX ADMIN — METOPROLOL TARTRATE 25 MG: 25 TABLET ORAL at 01:25

## 2023-01-16 RX ADMIN — SODIUM CHLORIDE, PRESERVATIVE FREE 10 ML: 5 INJECTION INTRAVENOUS at 12:17

## 2023-01-16 RX ADMIN — METFORMIN HYDROCHLORIDE 500 MG: 500 TABLET, EXTENDED RELEASE ORAL at 08:24

## 2023-01-16 RX ADMIN — SODIUM CHLORIDE, PRESERVATIVE FREE 10 ML: 5 INJECTION INTRAVENOUS at 14:18

## 2023-01-16 RX ADMIN — BUPROPION HYDROCHLORIDE 150 MG: 150 TABLET, EXTENDED RELEASE ORAL at 08:24

## 2023-01-16 RX ADMIN — SODIUM CHLORIDE: 9 INJECTION, SOLUTION INTRAVENOUS at 01:38

## 2023-01-16 RX ADMIN — ATORVASTATIN CALCIUM 40 MG: 80 TABLET, FILM COATED ORAL at 01:26

## 2023-01-16 RX ADMIN — FAMOTIDINE 20 MG: 20 TABLET, FILM COATED ORAL at 08:24

## 2023-01-16 RX ADMIN — SODIUM CHLORIDE, PRESERVATIVE FREE 10 ML: 5 INJECTION INTRAVENOUS at 12:30

## 2023-01-16 RX ADMIN — FAMOTIDINE 20 MG: 20 TABLET, FILM COATED ORAL at 20:53

## 2023-01-16 RX ADMIN — ATORVASTATIN CALCIUM 40 MG: 80 TABLET, FILM COATED ORAL at 20:53

## 2023-01-16 RX ADMIN — REGADENOSON 0.4 MG: 0.08 INJECTION, SOLUTION INTRAVENOUS at 12:29

## 2023-01-16 RX ADMIN — ACETAMINOPHEN 650 MG: 325 TABLET ORAL at 08:24

## 2023-01-16 RX ADMIN — TETRAKIS(2-METHOXYISOBUTYLISOCYANIDE)COPPER(I) TETRAFLUOROBORATE 14.2 MILLICURIE: 1 INJECTION, POWDER, LYOPHILIZED, FOR SOLUTION INTRAVENOUS at 12:30

## 2023-01-16 RX ADMIN — TETRAKIS(2-METHOXYISOBUTYLISOCYANIDE)COPPER(I) TETRAFLUOROBORATE 47.3 MILLICURIE: 1 INJECTION, POWDER, LYOPHILIZED, FOR SOLUTION INTRAVENOUS at 12:30

## 2023-01-16 RX ADMIN — KETOROLAC TROMETHAMINE 15 MG: 30 INJECTION, SOLUTION INTRAMUSCULAR; INTRAVENOUS at 11:27

## 2023-01-16 RX ADMIN — Medication 81 MG: at 08:24

## 2023-01-16 ASSESSMENT — PAIN DESCRIPTION - LOCATION: LOCATION: CHEST

## 2023-01-16 ASSESSMENT — PAIN SCALES - GENERAL
PAINLEVEL_OUTOF10: 6
PAINLEVEL_OUTOF10: 5
PAINLEVEL_OUTOF10: 5

## 2023-01-16 NOTE — H&P
[facility]  CDU / OBSERVATION eNCOUnter  Resident Note     Pt Name: Soha Paulino  MRN: [id number]  [place] 1965  Date of evaluation: 1/16/23  Patient's PCP is :  ZAK Mayen CNP    CHIEF COMPLAINT       Chief Complaint   Patient presents with    Shortness of Breath    Nausea    Chest Pain         HISTORY OF PRESENT ILLNESS    Soha Paulino is a 61 y.o. female who presents with left-sided chest pain/pressure that is occasionally sharp that began 2 days ago. Patient feels that the pain is radiating into the shoulder, epigastrium. Patient reports she does feel some mild shortness of breath and does have history of COPD with chronic cough. Patient previously had a PCI with drug-eluting stent placement in the right PCA in 7/21. Patient was recently diagnosed with COVID 1 month ago but is not positive today. Location/Symptom: Chest pain  Timing/Onset: 2 days prior  Provocation: Activity  Quality: Sharp and pressure  Radiation: To the left shoulder and epigastrium  Severity: Severe  Timing/Duration: Persistent  Modifying Factors: None    REVIEW OF SYSTEMS       General ROS - No fevers, No malaise   Ophthalmic ROS - No discharge, No changes in vision  ENT ROS -  No sore throat, No rhinorrhea,   Respiratory ROS - no shortness of breath, no cough, no  wheezing  Cardiovascular ROS -positive for chest pain  Gastrointestinal ROS - No abdominal pain, no nausea or vomiting, no change in bowel habits, no black or bloody stools  Genito-Urinary ROS - No dysuria, trouble voiding, or hematuria  Musculoskeletal ROS - No myalgias, No arthalgias  Neurological ROS - No headache, no dizziness/lightheadedness, No focal weakness, no loss of sensation  Dermatological ROS - No lesions, No rash     (PQRS) Advance directives on face sheet per hospital policy.  No change unless specifically mentioned in chart    PAST MEDICAL HISTORY    has a past medical history of Arthritis, Chronic midline low back pain without sciatica, Coronary artery disease of native artery of native heart with stable angina pectoris ([facility] [address] Essential hypertension, Gastroesophageal reflux disease without esophagitis, H/O vaginal hysterectomy, Mild chronic obstructive pulmonary disease ([address] Mixed hyperlipidemia, Tobacco abuse, Type 2 diabetes mellitus with hyperglycemia, without long-term current use of insulin ([facility] [address] and Vision abnormalities. I have reviewed the past medical history with the patient and it is pertinent to this complaint. SURGICAL HISTORY      has a past surgical history that includes Tonsillectomy;  section; Hysterectomy; hernia repair (Right); Breast surgery (Left); Colonoscopy (2015); Breast biopsy; Ovary removal; and Coronary stent placement (Right, 2021). I have reviewed and agree with Surgical History entered and it is pertinent to this complaint.      CURRENT MEDICATIONS     aspirin EC tablet 81 mg, Daily  clopidogrel (PLAVIX) tablet 75 mg, Daily  buPROPion (WELLBUTRIN XL) extended release tablet 150 mg, QAM  atorvastatin (LIPITOR) tablet 40 mg, Nightly  metoprolol tartrate (LOPRESSOR) tablet 25 mg, BID  famotidine (PEPCID) tablet 20 mg, BID  losartan (COZAAR) tablet 25 mg, Daily  metFORMIN (GLUCOPHAGE-XR) extended release tablet 500 mg, Daily with breakfast  0.9 % sodium chloride infusion, Continuous  sodium chloride flush 0.9 % injection 5-40 mL, 2 times per day  sodium chloride flush 0.9 % injection 5-40 mL, PRN  0.9 % sodium chloride infusion, PRN  potassium chloride (KLOR-CON M) extended release tablet 40 mEq, PRN   Or  potassium bicarb-citric acid (EFFER-K) effervescent tablet 40 mEq, PRN   Or  potassium chloride 10 mEq/100 mL IVPB (Peripheral Line), PRN  enoxaparin (LOVENOX) injection 40 mg, Daily  ondansetron (ZOFRAN) injection 4 mg, Q4H PRN  polyethylene glycol (GLYCOLAX) packet 17 g, Daily PRN  acetaminophen (TYLENOL) tablet 650 mg, Q6H PRN   Or  acetaminophen (TYLENOL) suppository 650 mg, Q6H PRN      All medication charted and reviewed. ALLERGIES     has No Known Allergies. FAMILY HISTORY     She indicated that her mother is alive. She indicated that the status of her father is unknown. She indicated that both of her sisters are alive. She indicated that all of her four brothers are alive. She indicated that her maternal grandmother is . She indicated that her maternal grandfather is . family history includes High Cholesterol in her brother; Hypertension in her brother; Stroke in her mother. The patient denies any pertinent family history. I have reviewed and agree with the family history entered. I have reviewed the Family History and it is not significant to the case    SOCIAL HISTORY      reports that she has been smoking cigarettes. She started smoking about 41 years ago. She has a 38.00 pack-year smoking history. She has never used smokeless tobacco. She reports current alcohol use. She reports that she does not use drugs. I have reviewed and agree with all Social.  There are concerns for substance abuse/use. PHYSICAL EXAM     INITIAL VITALS:  oral temperature is 96.8 °F (36 °C). Her blood pressure is 154/82 (abnormal) and her pulse is 58. Her respiration is 18 and oxygen saturation is 94%.       CONSTITUTIONAL: AOx4, no apparent distress, appears stated age    HEAD: normocephalic, atraumatic   EYES: PERRLA, EOMI    ENT: moist mucous membranes, uvula midline   NECK: supple, symmetric   BACK: symmetric   LUNGS: clear to auscultation bilaterally   CARDIOVASCULAR: regular rate and rhythm, no murmurs, rubs or gallops   ABDOMEN: soft, non-tender, non-distended with normal active bowel sounds   NEUROLOGIC:  MAEx4, no focal sensory or motor deficits   MUSCULOSKELETAL: no clubbing, cyanosis or edema   SKIN: no rash or wounds       DIFFERENTIAL DIAGNOSIS/MDM:     Chest Pain:  DDX: Emergent: ACS/NSTEMI/STEMI/angina, arrhythmia, trauma, aortic dissection,  PE, PNA, pneumothroax, esophageal rupture, tamponade, Cocaine use  Nonemergent: pneumonia, pericarditis, GERD, MSK, Endocarditis, anxiety  Evaluated for: diaphoresis, present chest pain, tachypnea, BP both arms, heart sounds, JVD, tender chest wall, wheezing    DIAGNOSTIC RESULTS     EKG: All EKG's are interpreted by the Observation Physician who either signs or Co-signs this chart in the absence of a cardiologist.    EKG Interpretation    Interpreted by observation physician    Rhythm: normal sinus   Rate: normal  Axis: normal  Ectopy: none  Conduction: normal  ST Segments: no acute change  T Waves: no acute change  Q Waves: none    Clinical Impression: no acute changes and non-specific EKG    Lorenzo Louise MD      RADIOLOGY:   I directly visualized the following  images and reviewed the radiologist interpretations:    XR CHEST PORTABLE    Result Date: 1/15/2023  EXAMINATION: ONE XRAY VIEW OF THE CHEST 1/15/2023 6:08 pm COMPARISON: 11/13/2021 HISTORY: ORDERING SYSTEM PROVIDED HISTORY: cardiac w/u, cough TECHNOLOGIST PROVIDED HISTORY: cardiac w/u, cough Initial encounter FINDINGS: Low lung volumes. There is slightly increased interstitial opacities bilaterally. No focal consolidation, pleural effusion or pneumothorax. The cardiomediastinal silhouette is stable. The osseous structures are stable. Slightly increased interstitial opacities bilaterally which may reflect mild edema versus an atypical or viral pneumonia. LABS:  I have reviewed and interpreted all available lab results.   Labs Reviewed   CBC WITH AUTO DIFFERENTIAL - Abnormal; Notable for the following components:       Result Value    Absolute Lymph # 4.37 (*)     All other components within normal limits   BASIC METABOLIC PANEL W/ REFLEX TO MG FOR LOW K - Abnormal; Notable for the following components:    Glucose 102 (*)     All other components within normal limits   HEPATIC FUNCTION PANEL - Abnormal; Notable for the following components:    Alkaline Phosphatase 105 (*)     Total Bilirubin 0.2 (*)     All other components within normal limits   BASIC METABOLIC PANEL W/ REFLEX TO MG FOR LOW K - Abnormal; Notable for the following components:    Glucose 114 (*)     Calcium 8.5 (*)     Chloride 108 (*)     All other components within normal limits   COVID-19, RAPID   LIPASE   TROPONIN   TROPONIN   D-DIMER, QUANTITATIVE   CBC WITH AUTO DIFFERENTIAL       SCREENING TOOLS:    HEART Risk Score for Chest Pain Patients  History and Physical Exam Suspicion Level  (Nausea, Vomiting, Diaphoresis, Radiation, Exertion)  Slightly Suspicious (0 pts)  Moderately Suspicious (1 pt)  Highly Suspicious (2 pts)  EKG Interpretation  Normal (0 pts)  Non-Specific Repolarization Disturbance (1 pt)  Significant ST-Depression (2 pts)  Age of Patient (in years)  = 39 (0 pts)  46-64 (1 pt)  = 65 (2 pts)  Risk Factors  No Risk Factors (0 pts)  1-2 Risk Factors (1 pt)  = 3 Risk Factors (2 pts)  Risk Factors Include:  Hypercholesterolemia  Hypertension  Diabetes Mellitus  Cigarette smoking  Positive family history  Obesity  CAD  (SLE, CKDz, HIV, Cocaine abuse)  Troponin Levels  = Normal Limit (0 pts)  1-3 Times Normal Limit (1 pt)  > 3 Times Normal Limit (2 pts)  TOTAL:    Percent Risk for Major Adverse Cardiac Event (MACE)  0-3 pts indicates low risk for MACE   2.5% (DISCHARGE)   4-7 pts indicates moderate risk for MACE  20.3% (OBS)  8-10 pts indicates high risk for MACE  72.7% (EARLY INVASIVE TX)    CDU IMPRESSION / Gavin Davis is a 61 y.o. female with a history of cardiac stent, COPD, COVID who presents with chest pain today.     Chest pain  EKG with no acute changes  Troponins less than 6  Heart score of 4  D-dimer within normal limits, excluding pulmonary embolism  Cardiology consulted, will keep patient n.p.o.  COPD  Saturating well on room air  Chest x-ray without any acute abnormality  Will continue home Symbicort and albuterol inhalers  May consider pulmonology consult or refer to pulmonology at discharge  Will advise smoking cessation  Continue home medications and pain control  Monitor vitals, labs, and imaging  DISPO: pending consults and clinical improvement    CONSULTS:    IP CONSULT TO CARDIOLOGY    PROCEDURES:  Not indicated       PATIENT REFERRED TO:    No follow-up provider specified. --  Sultana Ortez MD   Emergency Medicine Resident     This dictation was generated by voice recognition computer software. Although all attempts are made to edit the dictation for accuracy, there may be errors in the transcription that are not intended.

## 2023-01-16 NOTE — CONSULTS
Attestation signed by      Attending Physician Statement:    I have discussed the care of  Missy Thomas , including pertinent history and exam findings, with the Cardiology fellow/resident. I have seen and examined the patient and the key elements of all parts of the encounter have been performed by me. I agree with the assessment, plan and orders as documented by the fellow/resident, after I modified exam findings and plan of treatments, and the final version is my approved version of the assessment. Additional Comments:   79-year-old pleasant female with a history of coronary artery disease history of stent to RCA on 7/1/2021  Came with chest pain, negative tropes  Tried twice patient is not back from stress test  Plan if the stress test is negative can discharge home and follow-up in the office  If the stress test is positive then patient may need a cath tomorrow morning  Dr. Sarah Santizo Cardiology Cardiology    Consult / H&P               Today's Date: 1/16/2023  Patient Name: Missy Thomas  Date of admission: 1/15/2023  5:28 PM  Patient's age: 61 y. o., 1965  Admission Dx: Chest pain [R07.9]  Chest pain, unspecified type [R07.9]    Reason for Consult:  Cardiac evaluation    Requesting Physician: Vikram Martin MD    CHIEF COMPLAINT:  Chest pain, SOB, nausea    History Obtained From:  patient, electronic medical record    HISTORY OF PRESENT ILLNESS:      The patient is a 61 y.o.  female who is admitted to the hospital for Chest Pain  Amy Holcomb complains of chest pain. Onset was 1 day ago. Symptoms have been stable since that time. The patient's pain is intermittent and occurs at rest, with ADLs and activities. The patient describes the pain as heaviness, pressure, sharp and radiates to the left shoulder. Patient rates pain as a 6/10 in intensity. Associated symptoms are: near-syncope and orthopnea. Aggravating factors are: coughing. Alleviating factors are: none. Patient's cardiac risk factors are: diabetes mellitus, dyslipidemia, hypertension, obesity (BMI >= 30 kg/m2), and smoking/ tobacco exposure. Patient's risk factors for DVT/PE: none. Previous cardiac testing: chest x-ray, coronary angiography, date 2021, location ST, echocardiogram, and electrocardiogram (ECG). Past Medical History:   has a past medical history of Arthritis, Chronic midline low back pain without sciatica, Coronary artery disease of native artery of native heart with stable angina pectoris ([address] Essential hypertension, Gastroesophageal reflux disease without esophagitis, H/O vaginal hysterectomy, Mild chronic obstructive pulmonary disease ([address] Mixed hyperlipidemia, Tobacco abuse, Type 2 diabetes mellitus with hyperglycemia, without long-term current use of insulin ([address] and Vision abnormalities. Past Surgical History:   has a past surgical history that includes Tonsillectomy;  section; Hysterectomy; hernia repair (Right); Breast surgery (Left); Colonoscopy (8/27/15); Breast biopsy; and Ovary removal.     Home Medications:    Prior to Admission medications    Medication Sig Start Date End Date Taking? Authorizing Provider   metFORMIN (GLUCOPHAGE-XR) 500 MG extended release tablet Take 1 tablet by mouth once daily with breakfast 22   ZAK Barker CNP   gabapentin (NEURONTIN) 100 MG capsule Take 2 capsules by mouth 3 times daily for 30 days.  Intended supply: 30 days 22  ZAK Barker CNP   butalbital-acetaminophen-caffeine (FIORICET, ESGIC) -40 MG per tablet Take 1 tablet by mouth every 6 hours as needed for Headaches  Patient not taking: Reported on 1/15/2023 11/17/22   ZAK Barker CNP   budesonide-formoterol (SYMBICORT) 160-4.5 MCG/ACT AERO Inhale 2 puffs into the lungs 2 times daily 22  ZAK Barker CNP   albuterol sulfate HFA (PROVENTIL;VENTOLIN;PROAIR) 108 (90 Base) MCG/ACT inhaler Inhale 2 puffs into the lungs every 6 hours as needed for Wheezing or Shortness of Breath 11/17/22 12/17/22  ZAK Barker CNP   buPROPion (WELLBUTRIN XL) 150 MG extended release tablet Take 1 tablet by mouth every morning 11/17/22   ZAK Barker CNP   atorvastatin (LIPITOR) 40 MG tablet Take 1 tablet by mouth nightly 11/17/22   ZAK Barker CNP   metoprolol tartrate (LOPRESSOR) 25 MG tablet Take 1 tablet by mouth 2 times daily 11/17/22   ZAK Barker CNP   baclofen (LIORESAL) 10 MG tablet Take 1 tablet by mouth 3 times daily as needed (stiffness)  Patient not taking: Reported on 1/15/2023 11/17/22   ZAK Barker CNP   acetaminophen (TYLENOL) 500 MG tablet Take 1 tablet by mouth every 6 hours as needed for Pain (Take 1-2 tablets every 6 hours) 11/17/22 12/17/22  ZAK Barker CNP   famotidine (PEPCID) 20 MG tablet Take 1 tablet by mouth 2 times daily  Patient not taking: Reported on 1/15/2023 11/17/22   ZAK Barker CNP   losartan (COZAAR) 25 MG tablet Take 1 tablet by mouth daily  Patient not taking: Reported on 1/15/2023 11/17/22   ZAK Barker CNP   Handicap Placard MISC by Does not apply route Expires July 1, 2027 7/1/22   ZAK Barker CNP   clopidogrel (PLAVIX) 75 MG tablet Take 75 mg by mouth daily    Historical Provider, MD   Blood Glucose Monitoring Suppl (BLOOD GLUCOSE MONITOR SYSTEM) w/Device KIT Use as directed. Check glucose levels FASTING DAILY and as needed 7/26/21   Carisa Kramer MD   blood glucose monitor strips Test FASTING DAILY . BRAND OF CHOICE INSURANCE ALLOWS. 7/26/21   Carisa Kramer MD   Alcohol Swabs (ALCOHOL PREP) PADS Use as directed 7/14/21   ZAK Barker CNP   Lancets MISC 1 each by Does not apply route 2 times daily 7/14/21   Gerald Shepherd, APRN - CNP   aspirin 81 MG EC tablet Take 1 tablet by mouth daily  Patient not taking: Reported on 1/15/2023 7/3/21   Tere Burton, APRN - CNP      Current Facility-Administered Medications: aspirin EC tablet 81 mg, 81 mg, Oral, Daily  clopidogrel (PLAVIX) tablet 75 mg, 75 mg, Oral, Daily  buPROPion (WELLBUTRIN XL) extended release tablet 150 mg, 150 mg, Oral, QAM  atorvastatin (LIPITOR) tablet 40 mg, 40 mg, Oral, Nightly  metoprolol tartrate (LOPRESSOR) tablet 25 mg, 25 mg, Oral, BID  famotidine (PEPCID) tablet 20 mg, 20 mg, Oral, BID  losartan (COZAAR) tablet 25 mg, 25 mg, Oral, Daily  metFORMIN (GLUCOPHAGE-XR) extended release tablet 500 mg, 500 mg, Oral, Daily with breakfast  0.9 % sodium chloride infusion, , IntraVENous, Continuous  sodium chloride flush 0.9 % injection 5-40 mL, 5-40 mL, IntraVENous, 2 times per day  sodium chloride flush 0.9 % injection 5-40 mL, 5-40 mL, IntraVENous, PRN  0.9 % sodium chloride infusion, 25 mL, IntraVENous, PRN  potassium chloride (KLOR-CON M) extended release tablet 40 mEq, 40 mEq, Oral, PRN **OR** potassium bicarb-citric acid (EFFER-K) effervescent tablet 40 mEq, 40 mEq, Oral, PRN **OR** potassium chloride 10 mEq/100 mL IVPB (Peripheral Line), 10 mEq, IntraVENous, PRN  enoxaparin (LOVENOX) injection 40 mg, 40 mg, SubCUTAneous, Daily  ondansetron (ZOFRAN) injection 4 mg, 4 mg, IntraVENous, Q4H PRN  polyethylene glycol (GLYCOLAX) packet 17 g, 17 g, Oral, Daily PRN  acetaminophen (TYLENOL) tablet 650 mg, 650 mg, Oral, Q6H PRN **OR** acetaminophen (TYLENOL) suppository 650 mg, 650 mg, Rectal, Q6H PRN    Allergies:  Patient has no known allergies. Social History:   reports that she has been smoking cigarettes. She started smoking about 41 years ago. She has a 38.00 pack-year smoking history. She has never used smokeless tobacco. She reports current alcohol use. She reports that she does not use drugs. Family History: family history includes High Cholesterol in her brother; Hypertension in her brother; Stroke in her mother. No h/o sudden cardiac death.     REVIEW OF SYSTEMS: Constitutional: there has been no unanticipated weight loss. There's been No change in energy level, No change in activity level. Eyes: No visual changes or diplopia. No scleral icterus. ENT: No Headaches  Cardiovascular: Reports CP; denies palpitations  Respiratory: No previous pulmonary problems, No cough; Reports SOB  Gastrointestinal: No abdominal pain. No change in bowel or bladder habits. Reports post-prandial nausea  Genitourinary: No dysuria, trouble voiding, or hematuria. Musculoskeletal:  No gait disturbance, No weakness or joint complaints. Integumentary: No rash or pruritis. Notes recent increase in lower extremity swelling, imprinted by socks  Neurological: No headache, diplopia, change in muscle strength, numbness or tingling. No change in gait, balance, coordination, mood, affect, memory, mentation, behavior. Psychiatric: No anxiety, or depression. Endocrine: No temperature intolerance. No excessive thirst, fluid intake, or urination. No tremor. Hematologic/Lymphatic: No abnormal bruising or bleeding, blood clots or swollen lymph nodes. Allergic/Immunologic: No nasal congestion or hives. PHYSICAL EXAM:      BP (!) 149/82   Pulse 55   Temp 97.2 °F (36.2 °C) (Oral)   Resp 18   SpO2 95%    Constitutional and General Appearance: alert, cooperative, no distress and appears stated age  HEENT: PERRL, no cervical lymphadenopathy. No masses palpable. Normal oral mucosa  Respiratory:  Normal excursion and expansion without use of accessory muscles  Resp Auscultation: Good respiratory effort. No for increased work of breathing. On auscultation: clear to auscultation bilaterally  Cardiovascular:   The apical impulse is not displaced  Heart tones are crisp and normal. regular S1 and S2.  Jugular venous pulsation Normal  The carotid upstroke is normal in amplitude and contour without delay or bruit  Peripheral pulses are symmetrical and full   Abdomen:   No masses or tenderness  Bowel sounds present  Extremities:   No Cyanosis or Clubbing   Lower extremity edema: No   Skin: Warm and dry  Neurological:  Alert and oriented. Moves all extremities well  No abnormalities of mood, affect, memory, mentation, or behavior are noted    DATA:    Diagnostics:    EKG: sinus bradycardia, 55 bpm, unchanged from previous tracings. ECHO: not obtained. Stress Test: previously taken 06/30/2021 and show inferior reversible ischemia, intermediate risk. Cardiac Angiography: previously taken 07/01/2021 and show stenosis of proximal RCA -- successful stent placement; mild LAD and LCx disease. Labs:     CBC:   Recent Labs     01/15/23  1747 01/16/23  0532   WBC 11.0 8.1   HGB 13.2 12.3   HCT 41.3 39.4    181     BMP:   Recent Labs     01/15/23  1747 01/16/23  0532    142   K 3.8 3.9   CO2 25 25   BUN 12 12   CREATININE 0.66 0.61   LABGLOM >60 >60   GLUCOSE 102* 114*     BNP: No results for input(s): BNP in the last 72 hours. PT/INR: No results for input(s): PROTIME, INR in the last 72 hours. APTT:No results for input(s): APTT in the last 72 hours. CARDIAC ENZYMES:No results for input(s): CKTOTAL, CKMB, CKMBINDEX, TROPONINI in the last 72 hours.   FASTING LIPID PANEL:  Lab Results   Component Value Date/Time    HDL 33 11/28/2022 07:05 AM    TRIG 143 07/13/2021 08:50 AM     LIVER PROFILE:  Recent Labs     01/15/23  1747   AST 18   ALT 16   LABALBU 3.9       IMPRESSION:    Patient Active Problem List   Diagnosis    S/P hysterectomy    Current smoker    Gastroesophageal reflux disease without esophagitis    Chest pain    Coronary artery disease of native artery of native heart with stable angina pectoris (HCC)    Mixed hyperlipidemia    History of heart artery stent    Chronic midline low back pain without sciatica    S/P lumpectomy, left breast    S/P tonsillectomy    Type 2 diabetes mellitus with hyperglycemia, without long-term current use of insulin (HCC)    Mild chronic obstructive pulmonary disease ([facility] [address]    Influenza vaccination declined    Arthritis of both knees    Chronic tension-type headache, intractable    Vitamin D deficiency    Essential hypertension   Chest pain  S/P cardiac catheterization with RCA stent placement 07/01/2021  CAD  HTN  HLD  T2DM  COPD  Tobacco use    RECOMMENDATIONS:  Obtain Lexiscan stress test  Obtain TTE  Optimize anti-anginal medications  Continue ASA, plavix  Continue ARB, statin, BB  Monitor electrolytes to keep K>4, Mg>2  F/U on cardiac testing when resulted      Discussed with patient and Nurse.     Electronically signed by Denzel Casas on 1/16/2023 at [address] [place] [address] [facility]      [phone]

## 2023-01-16 NOTE — PROCEDURES
[address] [address]                              CARDIAC STRESS TEST    PATIENT NAME: Kimberly Rick                      :        1965  MED REC NO:   [id number]                             ROOM:       [facility]  ACCOUNT NO:   [de-identified]                           ADMIT DATE: 01/15/2023  PROVIDER:     Virgie Christopher    DATE OF STUDY:  2023    ORDERING PROVIDER: Dr. Uziel Thakur PROVIDER: Pool Molina CNP     INTERPRETING PHYSICIAN: Dr. Leslee Love STUDY:    Indication: Chest pain    Procedure explained and consent signed. Medications: Lexiscan, 0.4 mg  Resting Heart rate: 59 bpm  Resting blood pressure:  129/80 mm/Hg  Infusion heart rate:  98 bpm  Infusion blood pressure:  200/83 mm/Hg  Resting EKG: Normal  Stress heart response: Normal Response  Stress BP response: Appropriate  Stress EKG(S): No changes seen  Chest discomfort: None  Ischemic EKG changes: None    IMPRESSION:  Electrocardiographically Negative Lexiscan stress study. Radio-isotope  results to follow from the department of Nuclear Medicine.            Chantell Ahn    D: 2023 14:09:31       T: 2023 14:10:59     [id number]  Job#: [id number]     Doc#: Unknown    CC:

## 2023-01-16 NOTE — ED NOTES
\" I can still feel the pain and it's radiating to my left breast to my shoulder. I will rate it 6. \" as verbalized by the patient. Breathing even and unlabored. Family member at bedside. Will continue plan of care.      [address] [facility], RN  01/15/23 V Efrain Barbour RN  01/15/23 [id number]

## 2023-01-16 NOTE — ED NOTES
The following labs were labeled with patient stickers & tubed to lab;    []Lavender   []On Ice  []Blue  []Green/ Yellow  []Green/ Black []On Ice  []Pink  []Red  []Yellow    [x]COVID-19 Swab [x]Rapid  []COVID-19 Swab []PCR    []Urine Sample  []Pelvic Cultures    []Blood Cultures     Gee Brumfield RN  01/15/23 1914

## 2023-01-16 NOTE — CARE COORDINATION
Case Management Assessment  Initial Evaluation    Date/Time of Evaluation: 1/16/2023 10:43 AM  Assessment Completed by: Ashely Michel RN    If patient is discharged prior to next notation, then this note serves as note for discharge by case management. Patient Name: Erica Ordonez                   YOB: 1965  Diagnosis: Chest pain [R07.9]  Chest pain, unspecified type [R07.9]                   Date / Time: 1/15/2023  5:28 PM    Patient Admission Status: Observation   Readmission Risk (Low < 19, Mod (19-27), High > 27): No data recorded  Current PCP: ZAK Barker CNP  PCP verified by CM? (P) Yes    Chart Reviewed: Yes      History Provided by: (P) Patient  Patient Orientation: (P) Alert and Oriented    Patient Cognition: (P) Alert    Hospitalization in the last 30 days (Readmission):  No    If yes, Readmission Assessment in  Navigator will be completed.     Advance Directives:      Code Status: Full Code   Patient's Primary Decision Maker is:  self      Discharge Planning:    Patient lives with: (P) Spouse/Significant Other Type of Home: (P) House  Primary Care Giver: (P) Self  Patient Support Systems include: (P) Spouse/Significant Other   Current Financial resources: (P) HELP  Current community resources:    Current services prior to admission: (P) None            Current DME:              Type of Home Care services:  (P) None    ADLS  Prior functional level: (P) Independent in ADLs/IADLs  Current functional level: (P) Independent in ADLs/IADLs    PT AM-PAC:   /24  OT AM-PAC:   /24    Family can provide assistance at DC: (P) Yes  Would you like Case Management to discuss the discharge plan with any other family members/significant others, and if so, who? (P) No  Plans to Return to Present Housing: (P) Yes  Other Identified Issues/Barriers to RETURNING to current housing: none  Potential Assistance needed at discharge: (P) N/A            Potential DME:    Patient expects to discharge to: (P) [employer] Insurance for transportation at discharge: (P) Family    Financial    Payor: ROSALINE / Plan: ROSALINE / Product Type: *No Product type* /     Does insurance require precert for SNF: Does not have insurance, Des [place]rena with HELP called and voice mail is full    Potential assistance Purchasing Medications: (P) Yes  Meds-to-Beds request: Yes      Danny Everett, [address] [facility]  [place] [address] [place] [id number]  Phone: [phone] Fax: [phone]      Notes:    Factors facilitating achievement of predicted outcomes: Cooperative and Pleasant    Barriers to discharge: Pain    Additional Case Management Notes: Goal is home with spouse, has transp, HELP called and voice mail full    The Plan for Transition of Care is related to the following treatment goals of Chest pain [R07.9]  Chest pain, unspecified type [T66.6]    IF APPLICABLE: The Patient and/or patient representative Amy and her family were provided with a choice of provider and agrees with the discharge plan. Freedom of choice list with basic dialogue that supports the patient's individualized plan of care/goals and shares the quality data associated with the providers was provided to: (P) Patient   Patient Representative Name:       The Patient and/or Patient Representative Agree with the Discharge Plan?  (P) Yes    Gerardo Ruiz RN  Case Management Department  Ph: [phone] Mouna Kyle

## 2023-01-17 VITALS
OXYGEN SATURATION: 97 % | SYSTOLIC BLOOD PRESSURE: 156 MMHG | RESPIRATION RATE: 16 BRPM | DIASTOLIC BLOOD PRESSURE: 80 MMHG | HEART RATE: 53 BPM | TEMPERATURE: 97.7 F

## 2023-01-17 PROBLEM — R07.9 CHEST PAIN: Status: RESOLVED | Noted: 2021-06-29 | Resolved: 2023-01-17

## 2023-01-17 LAB
LV EF: 58 %
LVEF MODALITY: NORMAL

## 2023-01-17 PROCEDURE — G0378 HOSPITAL OBSERVATION PER HR: HCPCS

## 2023-01-17 PROCEDURE — 2580000003 HC RX 258: Performed by: STUDENT IN AN ORGANIZED HEALTH CARE EDUCATION/TRAINING PROGRAM

## 2023-01-17 PROCEDURE — 6370000000 HC RX 637 (ALT 250 FOR IP): Performed by: STUDENT IN AN ORGANIZED HEALTH CARE EDUCATION/TRAINING PROGRAM

## 2023-01-17 PROCEDURE — 94640 AIRWAY INHALATION TREATMENT: CPT

## 2023-01-17 PROCEDURE — 94760 N-INVAS EAR/PLS OXIMETRY 1: CPT

## 2023-01-17 PROCEDURE — 96372 THER/PROPH/DIAG INJ SC/IM: CPT

## 2023-01-17 PROCEDURE — 93306 TTE W/DOPPLER COMPLETE: CPT

## 2023-01-17 PROCEDURE — 6360000002 HC RX W HCPCS: Performed by: STUDENT IN AN ORGANIZED HEALTH CARE EDUCATION/TRAINING PROGRAM

## 2023-01-17 RX ORDER — ASPIRIN 81 MG/1
81 TABLET ORAL DAILY
Qty: 30 TABLET | Refills: 3 | Status: SHIPPED | OUTPATIENT
Start: 2023-01-17 | End: 2023-01-17 | Stop reason: SDUPTHER

## 2023-01-17 RX ORDER — LOSARTAN POTASSIUM 25 MG/1
25 TABLET ORAL DAILY
Qty: 30 TABLET | Refills: 0 | Status: SHIPPED | OUTPATIENT
Start: 2023-01-17 | End: 2023-01-17 | Stop reason: SDUPTHER

## 2023-01-17 RX ORDER — BUDESONIDE AND FORMOTEROL FUMARATE DIHYDRATE 160; 4.5 UG/1; UG/1
2 AEROSOL RESPIRATORY (INHALATION) 2 TIMES DAILY
Qty: 1 EACH | Refills: 1 | Status: SHIPPED | OUTPATIENT
Start: 2023-01-17 | End: 2023-02-16

## 2023-01-17 RX ORDER — ALBUTEROL SULFATE 90 UG/1
2 AEROSOL, METERED RESPIRATORY (INHALATION) EVERY 6 HOURS PRN
Qty: 18 G | Refills: 1 | Status: SHIPPED | OUTPATIENT
Start: 2023-01-17 | End: 2023-01-17 | Stop reason: SDUPTHER

## 2023-01-17 RX ORDER — ASPIRIN 81 MG/1
81 TABLET ORAL DAILY
Qty: 30 TABLET | Refills: 3 | Status: SHIPPED | OUTPATIENT
Start: 2023-01-17

## 2023-01-17 RX ORDER — FAMOTIDINE 20 MG/1
20 TABLET, FILM COATED ORAL 2 TIMES DAILY
Qty: 60 TABLET | Refills: 0 | Status: SHIPPED | OUTPATIENT
Start: 2023-01-17

## 2023-01-17 RX ORDER — FAMOTIDINE 20 MG/1
20 TABLET, FILM COATED ORAL 2 TIMES DAILY
Qty: 60 TABLET | Refills: 0 | Status: SHIPPED | OUTPATIENT
Start: 2023-01-17 | End: 2023-01-17 | Stop reason: SDUPTHER

## 2023-01-17 RX ORDER — BUDESONIDE AND FORMOTEROL FUMARATE DIHYDRATE 160; 4.5 UG/1; UG/1
2 AEROSOL RESPIRATORY (INHALATION) 2 TIMES DAILY
Qty: 1 EACH | Refills: 1 | Status: SHIPPED | OUTPATIENT
Start: 2023-01-17 | End: 2023-01-17 | Stop reason: SDUPTHER

## 2023-01-17 RX ORDER — ALBUTEROL SULFATE 90 UG/1
2 AEROSOL, METERED RESPIRATORY (INHALATION) EVERY 6 HOURS PRN
Qty: 18 G | Refills: 1 | Status: SHIPPED | OUTPATIENT
Start: 2023-01-17 | End: 2023-02-16

## 2023-01-17 RX ORDER — LOSARTAN POTASSIUM 25 MG/1
25 TABLET ORAL DAILY
Qty: 30 TABLET | Refills: 0 | Status: SHIPPED | OUTPATIENT
Start: 2023-01-17

## 2023-01-17 RX ADMIN — METFORMIN HYDROCHLORIDE 500 MG: 500 TABLET, EXTENDED RELEASE ORAL at 08:04

## 2023-01-17 RX ADMIN — LOSARTAN POTASSIUM 25 MG: 25 TABLET, FILM COATED ORAL at 08:03

## 2023-01-17 RX ADMIN — Medication 81 MG: at 08:03

## 2023-01-17 RX ADMIN — METOPROLOL TARTRATE 25 MG: 25 TABLET ORAL at 08:03

## 2023-01-17 RX ADMIN — SODIUM CHLORIDE, PRESERVATIVE FREE 10 ML: 5 INJECTION INTRAVENOUS at 08:04

## 2023-01-17 RX ADMIN — ENOXAPARIN SODIUM 40 MG: 100 INJECTION SUBCUTANEOUS at 08:03

## 2023-01-17 RX ADMIN — BUPROPION HYDROCHLORIDE 150 MG: 150 TABLET, EXTENDED RELEASE ORAL at 08:03

## 2023-01-17 RX ADMIN — ALBUTEROL SULFATE 2 PUFF: 90 AEROSOL, METERED RESPIRATORY (INHALATION) at 08:40

## 2023-01-17 RX ADMIN — BUDESONIDE AND FORMOTEROL FUMARATE DIHYDRATE 2 PUFF: 160; 4.5 AEROSOL RESPIRATORY (INHALATION) at 08:35

## 2023-01-17 RX ADMIN — FAMOTIDINE 20 MG: 20 TABLET, FILM COATED ORAL at 08:03

## 2023-01-17 RX ADMIN — CLOPIDOGREL 75 MG: 75 TABLET, FILM COATED ORAL at 08:03

## 2023-01-17 NOTE — PROGRESS NOTES
OBS/CDU   RESIDENT NOTE      Patients PCP is:  Gerald Shepherd, APRN - CNP        SUBJECTIVE      No acute events overnight. Has been able to tolerate a full diet without nausea or vomiting. The patient is urinating on her own and is passing flatus. Denies fever, chills, nausea, vomiting, shortness of breath, abdominal pain, focal weakness, numbness, tingling, urinary/bowel symptoms, vision changes, visual hallucinations, or headache. Patient reports improvement in chest pain and that it is now very mild and unnoticeable at times. PHYSICAL EXAM      General: NAD, AO X 3  Heent: EMOI, PERRL  Neck: SUPPLE, NO JVD  Cardiovascular: RRR, S1S2  Pulmonary: CTAB, NO SOB  Abdomen: SOFT, NTTP, ND, +BS  Extremities: +2/4 PULSES DISTAL, NO SWELLING  Neuro / Psych: NO NUMBNESS OR TINGLING, MENTATION AT BASELINE    PERTINENT TEST /EXAMS      I have reviewed all available laboratory results.     MEDICATIONS CURRENT   aspirin EC tablet 81 mg, Daily  clopidogrel (PLAVIX) tablet 75 mg, Daily  buPROPion (WELLBUTRIN XL) extended release tablet 150 mg, QAM  atorvastatin (LIPITOR) tablet 40 mg, Nightly  metoprolol tartrate (LOPRESSOR) tablet 25 mg, BID  famotidine (PEPCID) tablet 20 mg, BID  losartan (COZAAR) tablet 25 mg, Daily  metFORMIN (GLUCOPHAGE-XR) extended release tablet 500 mg, Daily with breakfast  sodium chloride flush 0.9 % injection 5-40 mL, 2 times per day  sodium chloride flush 0.9 % injection 5-40 mL, PRN  0.9 % sodium chloride infusion, PRN  potassium chloride (KLOR-CON M) extended release tablet 40 mEq, PRN   Or  potassium bicarb-citric acid (EFFER-K) effervescent tablet 40 mEq, PRN   Or  potassium chloride 10 mEq/100 mL IVPB (Peripheral Line), PRN  enoxaparin (LOVENOX) injection 40 mg, Daily  ondansetron (ZOFRAN) injection 4 mg, Q4H PRN  polyethylene glycol (GLYCOLAX) packet 17 g, Daily PRN  acetaminophen (TYLENOL) tablet 650 mg, Q6H PRN   Or  acetaminophen (TYLENOL) suppository 650 mg, Q6H PRN  albuterol sulfate HFA (PROVENTIL;VENTOLIN;PROAIR) 108 (90 Base) MCG/ACT inhaler 2 puff, Q6H PRN  budesonide-formoterol (SYMBICORT) 160-4.5 MCG/ACT inhaler 2 puff, BID  sodium chloride flush 0.9 % injection 10 mL, PRN  sodium chloride flush 0.9 % injection 10 mL, PRN  ketorolac (TORADOL) injection 15 mg, Once        All medication charted and reviewed. CONSULTS      IP CONSULT TO CARDIOLOGY    ASSESSMENT/PLAN       Kathrine Vela is a 61 y.o. female with a history of cardiac stent, COPD, COVID who presents with chest pain. Chest pain  EKG with no acute changes  Troponins less than 6  Heart score of 4  D-dimer within normal limits, excluding pulmonary embolism  Cardiology consulted  Stress test without any acute perfusion abnormalities  Echo pending for this morning  COPD  Saturating well on room air  Chest x-ray without any acute abnormality  Will continue home Symbicort and albuterol inhalers  Will advise smoking cessation  Continue home medications and pain control  Monitor vitals, labs, and imaging  DISPO: pending consults and clinical improvement    --  Miguel Guadarrama MD  Emergency Medicine Resident Physician     This dictation was generated by voice recognition computer software. Although all attempts are made to edit the dictation for accuracy, there may be errors in the transcription that are not intended.

## 2023-01-17 NOTE — PLAN OF CARE
Problem: Chronic Conditions and Co-morbidities  Goal: Patient's chronic conditions and co-morbidity symptoms are monitored and maintained or improved  1/17/2023 1136 by Edwige Rushing RN  Outcome: Progressing  1/17/2023 0555 by Nallely Palomino RN  Outcome: Progressing     Problem: Pain  Goal: Verbalizes/displays adequate comfort level or baseline comfort level  1/17/2023 1136 by Edwige Rushing RN  Outcome: Progressing  1/17/2023 0555 by Nallely Palomino RN  Outcome: Progressing     Problem: Safety - Adult  Goal: Free from fall injury  1/17/2023 1136 by Edwige Rushing RN  Outcome: Progressing  1/17/2023 0555 by Nallely Palomino RN  Outcome: Progressing     Problem: Respiratory - Adult  Goal: Achieves optimal ventilation and oxygenation  1/17/2023 1136 by Edwige Rushing RN  Outcome: Progressing  1/17/2023 0835 by Mavis Negron RCP  Outcome: Progressing

## 2023-01-17 NOTE — PROGRESS NOTES
[facility]  Progress Note                   Date:   1/17/2023  Patient name: Broderick Miller  Date of admission:  1/15/2023  5:28 PM  MRN:   [id number]  YOB: 1965  PCP: ZAK Macario CNP    Reason for Admission: Chest pain [R07.9]  Chest pain, unspecified type [R07.9]    Subjective:       Clinical Changes /Abnormalities:Patient seen and examined. Denies chest pain or shortness of breath. Not on Tele, vitals/labs reviewed . Stated that she has been out of her medication unable to afford them , Case management was notified    Review of Systems    Medications:   Scheduled Meds:   aspirin  81 mg Oral Daily    clopidogrel  75 mg Oral Daily    buPROPion  150 mg Oral QAM    atorvastatin  40 mg Oral Nightly    metoprolol tartrate  25 mg Oral BID    famotidine  20 mg Oral BID    losartan  25 mg Oral Daily    metFORMIN  500 mg Oral Daily with breakfast    sodium chloride flush  5-40 mL IntraVENous 2 times per day    enoxaparin  40 mg SubCUTAneous Daily    budesonide-formoterol  2 puff Inhalation BID    ketorolac  15 mg IntraVENous Once     Continuous Infusions:   sodium chloride       CBC:   Recent Labs     01/15/23  1747 01/16/23  0532   WBC 11.0 8.1   HGB 13.2 12.3    181     BMP:    Recent Labs     01/15/23  1747 01/16/23  0532    142   K 3.8 3.9    108*   CO2 25 25   BUN 12 12   CREATININE 0.66 0.61   GLUCOSE 102* 114*     Hepatic:  Recent Labs     01/15/23  1747   AST 18   ALT 16   BILITOT 0.2*   ALKPHOS 105*     Troponin:   Recent Labs     01/15/23  1747 01/15/23  1833   TROPHS <6 <6     BNP: No results for input(s): BNP in the last 72 hours. Lipids: No results for input(s): CHOL, HDL in the last 72 hours. Invalid input(s): LDLCALCU  INR: No results for input(s): INR in the last 72 hours. Stress test 1/17/2023   Impression       Normal study.        Risk stratification: Low       Objective:   Vitals: BP (!) 156/80   Pulse 53   Temp 97.7 °F (36.5 °C) (Oral)   Resp 16   SpO2 97%   General appearance: alert and cooperative with exam  HEENT: Head: Normocephalic, no lesions, without obvious abnormality.   Neck:no JVD, trachea midline, no adenopathy  Lungs: Clear to auscultation  Heart: Regular rate and rhythm, s1/s2 auscultated, no murmurs  Abdomen: soft, non-tender, bowel sounds active  Extremities: no edema  Neurologic: not done        Assessment / Acute Cardiac Problems:   Chest pain  S/P cardiac catheterization with RCA stent placement 07/01/2021  CAD  HTN  HLD  T2DM  COPD  Tobacco use    Patient Active Problem List:     S/P hysterectomy     Current smoker     Gastroesophageal reflux disease without esophagitis     Coronary artery disease of native artery of native heart with stable angina pectoris (HCC)     Mixed hyperlipidemia     History of heart artery stent     Chronic midline low back pain without sciatica     S/P lumpectomy, left breast     S/P tonsillectomy     Type 2 diabetes mellitus with hyperglycemia, without long-term current use of insulin (HCC)     Mild chronic obstructive pulmonary disease (HCC)     Influenza vaccination declined     Arthritis of both knees     Chronic tension-type headache, intractable     Vitamin D deficiency     Essential hypertension      Plan of Treatment:   Cardiac stable - stress test reviewed and negative-continue aspirin Plavix statin beta-blocker and ARB  ECHO Pending -if low risk patient can be discharged from cardiology clinic follow-up as outpatient    Electronically signed by ZAK Morgan NP on 1/17/2023 at 2:56 PM  [id number] [address].  [phone]

## 2023-01-17 NOTE — DISCHARGE INSTRUCTIONS
During your hospitalization a stress test was completed that showed no acute perfusion defects or acute ischemia. This test also showed good heart function. Please take your inhalers for COPD as prescribed. Please restart taking your aspirin, losartan and pepcid. We reccommended that you stop smoking. Please follow up with your PCP in the next week. They should be able to help you get your medications if you are unable to afford them. Please return to the ED if your pain worsens or returns, if you develop shortness of breath, abdominal pain, weakness, lightheadedness, nausea or vomiting.

## 2023-01-17 NOTE — PROGRESS NOTES
[address] [employer]  CDU / OBSERVATION ENCOUNTER  ATTENDING NOTE       I performed a history and physical examination of the patient and discussed management with the resident or midlevel provider. I reviewed the resident or midlevel provider's note and agree with the documented findings and plan of care. Any areas of disagreement are noted on the chart. I was personally present for the key portions of any procedures. I have documented in the chart those procedures where I was not present during the key portions. I have reviewed the nurses notes. I agree with the chief complaint, past medical history, past surgical history, allergies, medications, social and family history as documented unless otherwise noted below. The Family history, social history, and ROS are effectively unchanged since admission unless noted elsewhere in the chart. Patient seen yesterday by cardiology. Stress testing and echocardiography recommended. Patient with history of coronary artery disease previously requiring stent to RCA. Patient with negative work-up for acute ischemia. Patient had stress testing which was negative. Patient awaiting echocardiography. Patient with some ongoing symptoms.   Will obtain further testing and reconsult cardiology as necessary depending on testing results and ongoing symptoms    Alie Merritt MD  Attending Emergency  Physician

## 2023-01-17 NOTE — PROGRESS NOTES
[address] [facility]  CDU / OBSERVATION ENCOUNTER  ATTENDING NOTE       I performed a history and physical examination of the patient and discussed management with the resident or midlevel provider. I reviewed the resident or midlevel provider's note and agree with the documented findings and plan of care. Any areas of disagreement are noted on the chart. I was personally present for the key portions of any procedures. I have documented in the chart those procedures where I was not present during the key portions. I have reviewed the nurses notes. I agree with the chief complaint, past medical history, past surgical history, allergies, medications, social and family history as documented unless otherwise noted below. The Family history, social history, and ROS are effectively unchanged since admission unless noted elsewhere in the chart. Chest pain, epigastric discomfort. Patient with occasional sharp pain. Pain is radiating to shoulder and epigastric area. Unclear etiology. Patient with both anginal and not anginal features. Not exertional pain. Evaluated for cardiac disease. History of COPD. Chronic cough. Patient had previous PCI. Patient is recent COVID diagnosis. Not positive today. Patient evaluated with stress testing. Rest testing. Echocardiography ordered but not performed yet. Patient with ongoing symptomatology. We will keep for echocardiogram and reevaluation tomorrow.     Andrea Hollins MD  Attending Emergency  Physician

## 2023-01-18 ENCOUNTER — CARE COORDINATION (OUTPATIENT)
Dept: CASE MANAGEMENT | Age: 58
End: 2023-01-18

## 2023-01-18 NOTE — CARE COORDINATION
[facility] Care Transitions Initial Follow Up Call    Call within 2 business days of discharge: Yes      Patient: Tacos Dickinson Patient : 1965   MRN: [id number]  Reason for Admission: Chest pain  Discharge Date: 23 RARS: No data recorded    Last Discharge  Street       Date Complaint Diagnosis Description Type Department Provider    1/15/23 Shortness of Breath; Nausea; Chest Pain Chest pain, unspecified type . .. ED to Hosp-Admission (Discharged) (ADMITTED) RHIANNA Cabral MD; Lily Gonzalez. .. Was this an external facility discharge? No Discharge Facility: [facility]    Challenges to be reviewed by the provider   Additional needs identified to be addressed with provider: Yes  7 day hospital follow up               Method of communication with provider: staff message. 1st attempt to reach patient for Care Transitions. [facility] requesting return call. Contact information provided.   [phone]        Non-face-to-face services provided:  Obtained and reviewed discharge summary and/or continuity of care documents      Care Transitions 24 Hour Call    Care Transitions Interventions         Follow Up  Future Appointments   Date Time Provider Chasity Nicholas   3/17/2023 10:00 AM Mai Qureshi MD [facility] Mahendra Coats RN

## 2023-01-18 NOTE — DISCHARGE SUMMARY
CDU Discharge Summary        Patient:  Shoshana Momin  YOB: 1965    MRN: [id number]   Acct: [de-identified]    Primary Care Physician: ZAK Reese CNP    Admit date:  1/15/2023  5:28 PM  Discharge date: 1/17/2023  7:37 PM     Discharge Diagnoses:     1.)  Acute chest pain secondary to unknown etiology. Improved with IV hydration, analgesics and rest    Follow-up:  Call today/tomorrow for a follow up appointment with ZAK Reese CNP , or return to the Emergency Room with worsening symptoms    Stressed to patient the importance of following up with primary care doctor for further workup/management of symptoms. Pt verbalizes understanding and agrees with plan. Discharge Medication Changes:       Medication List        CONTINUE taking these medications      acetaminophen 500 MG tablet  Commonly known as: TYLENOL  Take 1 tablet by mouth every 6 hours as needed for Pain (Take 1-2 tablets every 6 hours)     albuterol sulfate  (90 Base) MCG/ACT inhaler  Commonly known as: PROVENTIL;VENTOLIN;PROAIR  Inhale 2 puffs into the lungs every 6 hours as needed for Wheezing or Shortness of Breath     Alcohol Prep Pads  Use as directed     aspirin 81 MG EC tablet  Take 1 tablet by mouth daily     atorvastatin 40 MG tablet  Commonly known as: LIPITOR  Take 1 tablet by mouth nightly     Blood Glucose Monitor System w/Device Kit  Use as directed. Check glucose levels FASTING DAILY and as needed     blood glucose test strips  Test FASTING DAILY . BRAND OF CHOICE INSURANCE ALLOWS.     budesonide-formoterol 160-4.5 MCG/ACT Aero  Commonly known as: Symbicort  Inhale 2 puffs into the lungs 2 times daily     buPROPion 150 MG extended release tablet  Commonly known as: WELLBUTRIN XL  Take 1 tablet by mouth every morning     clopidogrel 75 MG tablet  Commonly known as: PLAVIX     famotidine 20 MG tablet  Commonly known as: PEPCID  Take 1 tablet by mouth 2 times daily     Handicap Placard Misc  by Does not apply route Expires July 1, 2027     Lancets Misc  1 each by Does not apply route 2 times daily     losartan 25 MG tablet  Commonly known as: COZAAR  Take 1 tablet by mouth daily     metFORMIN 500 MG extended release tablet  Commonly known as: GLUCOPHAGE-XR  Take 1 tablet by mouth once daily with breakfast     metoprolol tartrate 25 MG tablet  Commonly known as: LOPRESSOR  Take 1 tablet by mouth 2 times daily            STOP taking these medications      baclofen 10 MG tablet  Commonly known as: LIORESAL     butalbital-acetaminophen-caffeine -40 MG per tablet  Commonly known as: FIORICET, ESGIC     gabapentin 100 MG capsule  Commonly known as: NEURONTIN               Where to Get Your Medications        You can get these medications from any pharmacy    Bring a paper prescription for each of these medications  albuterol sulfate  (90 Base) MCG/ACT inhaler  aspirin 81 MG EC tablet  budesonide-formoterol 160-4.5 MCG/ACT Aero  famotidine 20 MG tablet  losartan 25 MG tablet         Diet:  ADULT DIET; Regular, advance as tolerated     Activity:  As tolerated    Consultants: IP CONSULT TO CARDIOLOGY    Procedures:  Not indicated     Diagnostic Test:   Results for orders placed or performed during the hospital encounter of 01/15/23   COVID-19, Rapid    Specimen: Nasopharyngeal Swab   Result Value Ref Range    Specimen Description . NASOPHARYNGEAL SWAB     SARS-CoV-2, Rapid Not Detected Not Detected   NM MYOCARDIAL SPECT REST EXERCISE OR RX   Result Value Ref Range    Left Ventricular Ejection Fraction 63     LVEF MODALITY Nuclear    CBC with Auto Differential   Result Value Ref Range    WBC 11.0 3.5 - 11.3 k/uL    RBC 4.75 3.95 - 5.11 m/uL    Hemoglobin 13.2 11.9 - 15.1 g/dL    Hematocrit 41.3 36.3 - 47.1 %    MCV 86.9 82.6 - 102.9 fL    MCH 27.8 25.2 - 33.5 pg    MCHC 32.0 28.4 - 34.8 g/dL    RDW 13.2 11.8 - 14.4 %    Platelets 266 783 - 464 k/uL    MPV 11.4 8.1 - 13.5 fL    NRBC Automated 0.0 0.0 per 100 WBC    Seg Neutrophils 48 36 - 65 %    Lymphocytes 40 24 - 43 %    Monocytes 8 3 - 12 %    Eosinophils % 3 1 - 4 %    Basophils 1 0 - 2 %    Immature Granulocytes 0 0 %    Segs Absolute 5.37 1.50 - 8.10 k/uL    Absolute Lymph # 4.37 (H) 1.10 - 3.70 k/uL    Absolute Mono # 0.87 0.10 - 1.20 k/uL    Absolute Eos # 0.31 0.00 - 0.44 k/uL    Basophils Absolute 0.08 0.00 - 0.20 k/uL    Absolute Immature Granulocyte <0.03 0.00 - 0.30 k/uL   Basic Metabolic Panel w/ Reflex to MG   Result Value Ref Range    Glucose 102 (H) 70 - 99 mg/dL    BUN 12 6 - 20 mg/dL    Creatinine 0.66 0.50 - 0.90 mg/dL    Est, Glom Filt Rate >60 >60 mL/min/1.73m2    Calcium 9.0 8.6 - 10.4 mg/dL    Sodium 142 135 - 144 mmol/L    Potassium 3.8 3.7 - 5.3 mmol/L    Chloride 104 98 - 107 mmol/L    CO2 25 20 - 31 mmol/L    Anion Gap 13 9 - 17 mmol/L   Hepatic Function Panel   Result Value Ref Range    Albumin 3.9 3.5 - 5.2 g/dL    Alkaline Phosphatase 105 (H) 35 - 104 U/L    ALT 16 5 - 33 U/L    AST 18 <32 U/L    Total Bilirubin 0.2 (L) 0.3 - 1.2 mg/dL    Bilirubin, Direct <0.1 <0.3 mg/dL    Bilirubin, Indirect Can not be calculated 0.0 - 1.0 mg/dL    Total Protein 7.1 6.4 - 8.3 g/dL    Albumin/Globulin Ratio 1.2 1.0 - 2.5   Lipase   Result Value Ref Range    Lipase 36 13 - 60 U/L   Troponin   Result Value Ref Range    Troponin, High Sensitivity <6 0 - 14 ng/L   Troponin   Result Value Ref Range    Troponin, High Sensitivity <6 0 - 14 ng/L   D-Dimer, Quantitative   Result Value Ref Range    D-Dimer, Quant 0.32 mg/L FEU   Basic Metabolic Panel w/ Reflex to MG   Result Value Ref Range    Glucose 114 (H) 70 - 99 mg/dL    BUN 12 6 - 20 mg/dL    Creatinine 0.61 0.50 - 0.90 mg/dL    Est, Glom Filt Rate >60 >60 mL/min/1.73m2    Calcium 8.5 (L) 8.6 - 10.4 mg/dL    Sodium 142 135 - 144 mmol/L    Potassium 3.9 3.7 - 5.3 mmol/L    Chloride 108 (H) 98 - 107 mmol/L    CO2 25 20 - 31 mmol/L    Anion Gap 9 9 - 17 mmol/L   CBC with Auto Differential   Result Value Ref Range    WBC 8.1 3.5 - 11.3 k/uL    RBC 4.45 3.95 - 5.11 m/uL    Hemoglobin 12.3 11.9 - 15.1 g/dL    Hematocrit 39.4 36.3 - 47.1 %    MCV 88.5 82.6 - 102.9 fL    MCH 27.6 25.2 - 33.5 pg    MCHC 31.2 28.4 - 34.8 g/dL    RDW 13.2 11.8 - 14.4 %    Platelets 283 942 - 256 k/uL    MPV 11.7 8.1 - 13.5 fL    NRBC Automated 0.0 0.0 per 100 WBC    Seg Neutrophils 48 36 - 65 %    Lymphocytes 40 24 - 43 %    Monocytes 8 3 - 12 %    Eosinophils % 3 1 - 4 %    Basophils 1 0 - 2 %    Immature Granulocytes 0 0 %    Segs Absolute 3.90 1.50 - 8.10 k/uL    Absolute Lymph # 3.28 1.10 - 3.70 k/uL    Absolute Mono # 0.61 0.10 - 1.20 k/uL    Absolute Eos # 0.25 0.00 - 0.44 k/uL    Basophils Absolute 0.05 0.00 - 0.20 k/uL    Absolute Immature Granulocyte 0.03 0.00 - 0.30 k/uL   EKG 12 Lead   Result Value Ref Range    Ventricular Rate 74 BPM    Atrial Rate 74 BPM    P-R Interval 172 ms    QRS Duration 76 ms    Q-T Interval 406 ms    QTc Calculation (Bazett) 450 ms    P Axis 41 degrees    R Axis 37 degrees    T Axis 63 degrees   EKG 12 lead   Result Value Ref Range    Ventricular Rate 55 BPM    Atrial Rate 55 BPM    P-R Interval 176 ms    QRS Duration 78 ms    Q-T Interval 446 ms    QTc Calculation (Bazett) 426 ms    P Axis 22 degrees    R Axis 28 degrees    T Axis 54 degrees     XR CHEST PORTABLE    Result Date: 1/15/2023  EXAMINATION: ONE XRAY VIEW OF THE CHEST 1/15/2023 6:08 pm COMPARISON: 11/13/2021 HISTORY: ORDERING SYSTEM PROVIDED HISTORY: cardiac w/u, cough TECHNOLOGIST PROVIDED HISTORY: cardiac w/u, cough Initial encounter FINDINGS: Low lung volumes. There is slightly increased interstitial opacities bilaterally. No focal consolidation, pleural effusion or pneumothorax. The cardiomediastinal silhouette is stable. The osseous structures are stable. Slightly increased interstitial opacities bilaterally which may reflect mild edema versus an atypical or viral pneumonia.      NM MYOCARDIAL SPECT REST EXERCISE OR RX    Result Date: 1/16/2023  EXAMINATION: MYOCARDIAL PERFUSION IMAGING 1/16/2023 12:15 pm TECHNIQUE: For the rest study, 47.3 mCi of Tc-99m labeled sestamibi were injected. SPECT images with ECG gating were acquired. Under cardiology supervision, 0.4 mg Lexiscan was infused. After pharmacologic stress, 14.2 mCi of Tc-99m labeled sestamibi were injected. SPECT images were acquired. COMPARISON: None Available. HISTORY: ORDERING SYSTEM PROVIDED HISTORY: Chest pain TECHNOLOGIST PROVIDED HISTORY: Reason for Exam: Chest pain Procedure Type->Rx Reason for Exam: chest pain, hypertension, hx of heart stent, smoker, diabetes FINDINGS: There is no evidence for a significant reversible or fixed perfusion defect. The gated images show no wall motion abnormalities. Normal myocardial thickening. Perfusion scores are visually adjusted to account for artifact. Summed stress score:  0 Summed rest score:  0 Summed reversibility score:  0 Function: End diastolic volume:  11NW Left ventricular ejection fraction:  63% TID score:  0.81 (scores greater than 1.39 are considered elevated for Lexiscan stress with Tc99m) Notes concerning risk stratification: Risk stratification incorporates both clinical history and some testing results. Final risk determination is the responsibility of the ordering provider as other patient information and test results may increase or decrease the risk assessment reported for this examination. Risk stratification criteria are adapted from \"Noninvasive Risk Stratification\" criteria from [employer]. Al, ACC/AATS/AHA/ASE/ASNC/SCAI/SCCT/STS 2017 Appropriate Use Criteria For Coronary Revascularization in Patients With Stable Ischemic Heart Disease [facility] Volume 69, Issue 17, May 2017 High risk (>3% annual death or MI) 1. Severe resting LV dysfunction (LVEF <35%) not readily explained by non coronary causes 2.  Resting perfusion abnormalities greater than 10% of the myocardium in patients without prior history or evidence of MI 3. Stress-induced perfusion abnormalities encumbering greater than or equal to 10% myocardium or stress segmental scores indicating multiple vascular territories with abnormalities 4. Stress-induced LV dilatation (TID ratio greater than 1.19 for exercise and greater than 1.39 for regadenoson) Intermediate risk (1% to 3% annual death or MI) 1. Mild/moderate resting LV dysfunction (LVEF 35% to 49%) not readily explained by non coronary causes. 2. Resting perfusion abnormalities in 5%-9.9% of the myocardium in patients without a history or prior evidence of MI 3. Stress-induced perfusion abnormality encumbering 5%-9.9% of the myocardium or stress segmental scores indicating 1 vascular territory with abnormalities but without LV dilation 4. Small wall motion abnormality involving 1-2 segments and only 1 coronary bed. Low Risk (Less than 1% annual death or MI) 1. Normal or small myocardial perfusion defect at rest or with stress encumbering less than 5% of the myocardium. Normal study. Risk stratification: Low           Physical Exam:    General appearance - NAD, AOx 3   Lungs -CTAB, no R/R/R  Heart - RRR, no M/R/G  Abdomen - Soft, NT/ND  Neurological:  MAEx4, No focal motor deficit, sensory loss  Extremities - Cap refil <2 sec in all ext., no edema  Skin -warm, dry      Hospital Course:  Clinical course has improved, labs and imaging reviewed. Soha Paulino originally presented to the hospital on 1/15/2023  5:28 PM with complaints of chest pain and pressure that began 2 days prior to arrival.  At that time it was determined that She required further observation and cardiac evaluation as she has a history of a drug-eluting stent placed in July 2021. Labs and imaging were followed daily. Imaging results as above. She is medically stable to be discharged. Patient educated to follow-up with PCP within 1 week and to return to the emergency department with any returning or worsening symptoms.   Echocardiogram complete, patient is currently asymptomatic and would like to be discharged. We will plan to follow-up with patient on results once received      Disposition: Home    Patient stated that they will not drive themselves home from the hospital if they have gotten pain killers/ narcotics earlier that day and that they will arrange for transportation on their own or work with the  for a ride. Patient counseled NOT to drive while under the influence of narcotics/ pain killers. Condition: Good    Patient stable and ready for discharge home. I have discussed plan of care with patient and they are in understanding. They were instructed to read discharge paperwork. All of their questions and concerns were addressed. Time Spent: 0 day      --  My Supervising Physician for today is  Dr. Patti Vides  We discussed and agreed upon a treatment plan   Jailene Nelson, [address] [facility]  Emergency Medicine Attending Physician    This dictation was generated by voice recognition computer software. Although all attempts are made to edit the dictation for accuracy, there may be errors in the transcription that are not intended.

## 2023-01-18 NOTE — RESULT ENCOUNTER NOTE
Addressed in inpatient    Normal echo 2D  Future Appointments  3/17/2023  10:00 AM   Vicky Clark MD     fp [facility]

## 2023-01-19 ENCOUNTER — CARE COORDINATION (OUTPATIENT)
Dept: CASE MANAGEMENT | Age: 58
End: 2023-01-19

## 2023-01-19 ENCOUNTER — TELEPHONE (OUTPATIENT)
Dept: FAMILY MEDICINE CLINIC | Age: 58
End: 2023-01-19

## 2023-01-19 NOTE — CARE COORDINATION
[facility] Care Transitions Initial Follow Up Call    Call within 2 business days of discharge: Yes      Patient: Ari Mclean Patient : 1965   MRN: [id number]  Reason for Admission: chest pain  Discharge Date: 23 RARS: No data recorded    Last Discharge [address]       Date Complaint Diagnosis Description Type Department Provider    1/15/23 Shortness of Breath; Nausea; Chest Pain Chest pain, unspecified type . .. ED to Hosp-Admission (Discharged) (ADMITTED) RHIANNA Arizmendi MD; Karina Calloway. .. Was this an external facility discharge? No Discharge Facility: [facility]    Challenges to be reviewed by the provider   Additional needs identified to be addressed with provider: No  none               Method of communication with provider: none. Final attempt to reach patient for care transitions. LM requesting return call. Contact information provided. Episode resolved at this time.         Non-face-to-face services provided:  Obtained and reviewed discharge summary and/or continuity of care documents    Care Transitions 24 Hour Call    Care Transitions Interventions         Follow Up  Future Appointments   Date Time Provider Chasity Nicholas   3/17/2023 10:00 AM Linh Hedrick MD fp shira Somers RN

## 2023-01-19 NOTE — TELEPHONE ENCOUNTER
Care Transitions Initial Follow Up Call    Outreach made within 2 business days of discharge: Yes    Patient: Rosalina Granda Patient : 1965   MRN: [id number]  Reason for Admission: There are no discharge diagnoses documented for the most recent discharge. Discharge Date: 23       Spoke with: [facility]    Discharge department/facility:     [facility] Interactive Patient Contact:  Was patient able to fill all prescriptions:   Was patient instructed to bring all medications to the follow-up visit:   Is patient taking all medications as directed in the discharge summary?    Does patient understand their discharge instructions:   Does patient have questions or concerns that need addressed prior to 7-14 day follow up office visit:     Scheduled appointment with PCP within 7-14 days    Follow Up  Future Appointments   Date Time Provider Chasity Nicholas   3/17/2023 10:00 AM Belvin Cabot, MD [facility] [address] [facility] [place]

## 2023-03-07 DIAGNOSIS — M54.50 CHRONIC MIDLINE LOW BACK PAIN WITHOUT SCIATICA: ICD-10-CM

## 2023-03-07 DIAGNOSIS — G89.29 CHRONIC MIDLINE LOW BACK PAIN WITHOUT SCIATICA: ICD-10-CM

## 2023-03-07 DIAGNOSIS — M17.0 ARTHRITIS OF BOTH KNEES: ICD-10-CM

## 2023-03-07 DIAGNOSIS — E11.65 TYPE 2 DIABETES MELLITUS WITH HYPERGLYCEMIA, WITHOUT LONG-TERM CURRENT USE OF INSULIN (HCC): ICD-10-CM

## 2023-03-07 RX ORDER — ACETAMINOPHEN 500 MG
500 TABLET ORAL EVERY 6 HOURS PRN
Qty: 120 TABLET | Refills: 0 | Status: SHIPPED | OUTPATIENT
Start: 2023-03-07 | End: 2023-04-06

## 2023-03-07 RX ORDER — METFORMIN HYDROCHLORIDE 500 MG/1
TABLET, EXTENDED RELEASE ORAL
Qty: 30 TABLET | Refills: 0 | Status: SHIPPED | OUTPATIENT
Start: 2023-03-07

## 2023-03-07 NOTE — TELEPHONE ENCOUNTER
Please Approve or Refuse.   Send to Pharmacy per Pt's Request:      Next Visit Date:  3/17/2023   Last Visit Date: 11/17/2022    Hemoglobin A1C (%)   Date Value   11/17/2022 6.5   06/27/2022 6.2   02/25/2022 6.2             ( goal A1C is < 7)   BP Readings from Last 3 Encounters:   01/17/23 (!) 156/80   11/17/22 (!) 140/90   06/27/22 120/80          (goal 120/80)  BUN   Date Value Ref Range Status   01/16/2023 12 6 - 20 mg/dL Final     Creatinine   Date Value Ref Range Status   01/16/2023 0.61 0.50 - 0.90 mg/dL Final     Potassium   Date Value Ref Range Status   01/16/2023 3.9 3.7 - 5.3 mmol/L Final

## 2023-03-27 DIAGNOSIS — E11.65 TYPE 2 DIABETES MELLITUS WITH HYPERGLYCEMIA, WITHOUT LONG-TERM CURRENT USE OF INSULIN (HCC): ICD-10-CM

## 2023-03-27 RX ORDER — METFORMIN HYDROCHLORIDE 500 MG/1
TABLET, EXTENDED RELEASE ORAL
Qty: 30 TABLET | Refills: 0 | OUTPATIENT
Start: 2023-03-27

## 2023-04-03 DIAGNOSIS — E11.65 TYPE 2 DIABETES MELLITUS WITH HYPERGLYCEMIA, WITHOUT LONG-TERM CURRENT USE OF INSULIN (HCC): ICD-10-CM

## 2023-04-03 RX ORDER — METFORMIN HYDROCHLORIDE 500 MG/1
TABLET, EXTENDED RELEASE ORAL
Qty: 30 TABLET | Refills: 0 | Status: SHIPPED | OUTPATIENT
Start: 2023-04-03

## 2023-04-03 NOTE — TELEPHONE ENCOUNTER
Please Approve or Refuse.   Send to Pharmacy per Pt's Request:      Next Visit Date:  8/16/2023   Last Visit Date: 11/17/2022    Hemoglobin A1C (%)   Date Value   11/17/2022 6.5   06/27/2022 6.2   02/25/2022 6.2             ( goal A1C is < 7)   BP Readings from Last 3 Encounters:   01/17/23 (!) 156/80   11/17/22 (!) 140/90   06/27/22 120/80          (goal 120/80)  BUN   Date Value Ref Range Status   01/16/2023 12 6 - 20 mg/dL Final     Creatinine   Date Value Ref Range Status   01/16/2023 0.61 0.50 - 0.90 mg/dL Final     Potassium   Date Value Ref Range Status   01/16/2023 3.9 3.7 - 5.3 mmol/L Final

## 2023-04-22 SDOH — HEALTH STABILITY: PHYSICAL HEALTH: ON AVERAGE, HOW MANY DAYS PER WEEK DO YOU ENGAGE IN MODERATE TO STRENUOUS EXERCISE (LIKE A BRISK WALK)?: 2 DAYS

## 2023-04-22 SDOH — HEALTH STABILITY: PHYSICAL HEALTH: ON AVERAGE, HOW MANY MINUTES DO YOU ENGAGE IN EXERCISE AT THIS LEVEL?: 10 MIN

## 2023-04-25 ENCOUNTER — OFFICE VISIT (OUTPATIENT)
Dept: FAMILY MEDICINE CLINIC | Age: 58
End: 2023-04-25

## 2023-04-25 VITALS
BODY MASS INDEX: 36.32 KG/M2 | OXYGEN SATURATION: 95 % | DIASTOLIC BLOOD PRESSURE: 80 MMHG | WEIGHT: 218 LBS | SYSTOLIC BLOOD PRESSURE: 138 MMHG | HEIGHT: 65 IN | TEMPERATURE: 97.3 F | HEART RATE: 65 BPM

## 2023-04-25 DIAGNOSIS — I10 ESSENTIAL HYPERTENSION: ICD-10-CM

## 2023-04-25 DIAGNOSIS — M54.50 CHRONIC MIDLINE LOW BACK PAIN WITHOUT SCIATICA: ICD-10-CM

## 2023-04-25 DIAGNOSIS — Z12.31 SCREENING MAMMOGRAM FOR HIGH-RISK PATIENT: ICD-10-CM

## 2023-04-25 DIAGNOSIS — E11.65 TYPE 2 DIABETES MELLITUS WITH HYPERGLYCEMIA, WITHOUT LONG-TERM CURRENT USE OF INSULIN (HCC): Primary | ICD-10-CM

## 2023-04-25 DIAGNOSIS — J44.9 MILD CHRONIC OBSTRUCTIVE PULMONARY DISEASE (HCC): ICD-10-CM

## 2023-04-25 DIAGNOSIS — Z87.891 PERSONAL HISTORY OF TOBACCO USE: ICD-10-CM

## 2023-04-25 DIAGNOSIS — E55.9 VITAMIN D DEFICIENCY: ICD-10-CM

## 2023-04-25 DIAGNOSIS — E78.2 MIXED HYPERLIPIDEMIA: ICD-10-CM

## 2023-04-25 DIAGNOSIS — I25.118 CORONARY ARTERY DISEASE OF NATIVE ARTERY OF NATIVE HEART WITH STABLE ANGINA PECTORIS (HCC): ICD-10-CM

## 2023-04-25 DIAGNOSIS — B35.1 ONYCHOMYCOSIS: ICD-10-CM

## 2023-04-25 DIAGNOSIS — G89.29 CHRONIC MIDLINE LOW BACK PAIN WITHOUT SCIATICA: ICD-10-CM

## 2023-04-25 LAB — HBA1C MFR BLD: 6.3 %

## 2023-04-25 PROCEDURE — 3044F HG A1C LEVEL LT 7.0%: CPT | Performed by: FAMILY MEDICINE

## 2023-04-25 PROCEDURE — 3079F DIAST BP 80-89 MM HG: CPT | Performed by: FAMILY MEDICINE

## 2023-04-25 PROCEDURE — 99214 OFFICE O/P EST MOD 30 MIN: CPT | Performed by: FAMILY MEDICINE

## 2023-04-25 PROCEDURE — 83036 HEMOGLOBIN GLYCOSYLATED A1C: CPT | Performed by: FAMILY MEDICINE

## 2023-04-25 PROCEDURE — 3075F SYST BP GE 130 - 139MM HG: CPT | Performed by: FAMILY MEDICINE

## 2023-04-25 RX ORDER — IBUPROFEN 400 MG/1
400 TABLET ORAL 2 TIMES DAILY PRN
Qty: 90 TABLET | Refills: 0 | Status: SHIPPED | OUTPATIENT
Start: 2023-04-25

## 2023-04-25 RX ORDER — LIDOCAINE 40 MG/G
CREAM TOPICAL
Qty: 45 G | Refills: 3 | Status: SHIPPED | OUTPATIENT
Start: 2023-04-25

## 2023-04-25 RX ORDER — ALBUTEROL SULFATE 90 UG/1
2 AEROSOL, METERED RESPIRATORY (INHALATION) EVERY 6 HOURS PRN
Qty: 18 G | Refills: 1 | Status: SHIPPED | OUTPATIENT
Start: 2023-04-25 | End: 2023-05-25

## 2023-04-25 RX ORDER — NICOTINE 21 MG/24HR
1 PATCH, TRANSDERMAL 24 HOURS TRANSDERMAL EVERY 24 HOURS
Qty: 30 PATCH | Refills: 3 | Status: SHIPPED | OUTPATIENT
Start: 2023-04-25 | End: 2024-04-24

## 2023-04-25 RX ORDER — BUDESONIDE AND FORMOTEROL FUMARATE DIHYDRATE 160; 4.5 UG/1; UG/1
2 AEROSOL RESPIRATORY (INHALATION) 2 TIMES DAILY
Qty: 1 EACH | Refills: 1 | Status: SHIPPED | OUTPATIENT
Start: 2023-04-25 | End: 2023-05-25

## 2023-04-25 SDOH — ECONOMIC STABILITY: INCOME INSECURITY: HOW HARD IS IT FOR YOU TO PAY FOR THE VERY BASICS LIKE FOOD, HOUSING, MEDICAL CARE, AND HEATING?: NOT HARD AT ALL

## 2023-04-25 SDOH — ECONOMIC STABILITY: HOUSING INSECURITY
IN THE LAST 12 MONTHS, WAS THERE A TIME WHEN YOU DID NOT HAVE A STEADY PLACE TO SLEEP OR SLEPT IN A SHELTER (INCLUDING NOW)?: NO

## 2023-04-25 SDOH — ECONOMIC STABILITY: FOOD INSECURITY: WITHIN THE PAST 12 MONTHS, THE FOOD YOU BOUGHT JUST DIDN'T LAST AND YOU DIDN'T HAVE MONEY TO GET MORE.: NEVER TRUE

## 2023-04-25 SDOH — ECONOMIC STABILITY: FOOD INSECURITY: WITHIN THE PAST 12 MONTHS, YOU WORRIED THAT YOUR FOOD WOULD RUN OUT BEFORE YOU GOT MONEY TO BUY MORE.: NEVER TRUE

## 2023-04-25 ASSESSMENT — ENCOUNTER SYMPTOMS
SINUS PRESSURE: 0
NAUSEA: 0
RHINORRHEA: 0
CHEST TIGHTNESS: 0
COUGH: 0
STRIDOR: 0
ABDOMINAL DISTENTION: 0
COLOR CHANGE: 0
SORE THROAT: 0
VOMITING: 0
TROUBLE SWALLOWING: 0
DIARRHEA: 0
BLOOD IN STOOL: 0
BACK PAIN: 1
WHEEZING: 0
SHORTNESS OF BREATH: 0
CONSTIPATION: 0
ABDOMINAL PAIN: 0
EYE REDNESS: 0
RECTAL PAIN: 0

## 2023-04-25 ASSESSMENT — PATIENT HEALTH QUESTIONNAIRE - PHQ9
SUM OF ALL RESPONSES TO PHQ QUESTIONS 1-9: 0
SUM OF ALL RESPONSES TO PHQ9 QUESTIONS 1 & 2: 0
2. FEELING DOWN, DEPRESSED OR HOPELESS: 0
SUM OF ALL RESPONSES TO PHQ QUESTIONS 1-9: 0
1. LITTLE INTEREST OR PLEASURE IN DOING THINGS: 0

## 2023-04-25 NOTE — PROGRESS NOTES
Visit Information    Have you changed or started any medications since your last visit including any over-the-counter medicines, vitamins, or herbal medicines? no   Are you having any side effects from any of your medications? -  no  Have you stopped taking any of your medications? Is so, why? -  no    Have you seen any other physician or provider since your last visit? No  Have you had any other diagnostic tests since your last visit? No  Have you been seen in the emergency room and/or had an admission to a hospital since we last saw you? No  Have you had your routine dental cleaning in the past 6 months? no    Have you activated your [employer] account? If not, what are your barriers?  Yes     Patient Care Team:  Merlinda Common, MD as PCP - General (Family Medicine)  Gennie Litten, DO as Consulting Physician (Obstetrics & Gynecology)    Medical History Review  Past Medical, Family, and Social History reviewed and does contribute to the patient presenting condition    Health Maintenance   Topic Date Due    Diabetic foot exam  Never done    Diabetic retinal exam  Never done    Hepatitis B vaccine (1 of 3 - Risk 3-dose series) Never done    DTaP/Tdap/Td vaccine (1 - Tdap) Never done    Shingles vaccine (1 of 2) Never done    COVID-19 Vaccine (3 - Booster for Moderna series) 07/16/2021    Diabetic Alb to Cr ratio (uACR) test  11/01/2022    Low dose CT lung screening  11/01/2022    Depression Screen  06/27/2023    Breast cancer screen  07/14/2023    Flu vaccine (Season Ended) 08/01/2023    A1C test (Diabetic or Prediabetic)  11/17/2023    Lipids  11/28/2023    GFR test (Diabetes, CKD 3-4, OR last GFR 15-59)  01/16/2024    Colorectal Cancer Screen  08/27/2025    Pneumococcal 0-64 years Vaccine  Completed    Hepatitis C screen  Completed    HIV screen  Completed    Hepatitis A vaccine  Aged Out    Hib vaccine  Aged Out    Meningococcal (ACWY) vaccine  Aged Out    Diabetes screen  Discontinued    Cervical cancer screen

## 2023-04-25 NOTE — PROGRESS NOTES
Chief Complaint   Patient presents with    Coronary Artery Disease    Diabetes         Bille Gearing  here today for follow up on chronic medical problems, go over labs and/or diagnostic studies, and medication refills. Coronary Artery Disease and Diabetes      HPI: Patient is scheduled for follow-up on chronic medical conditions. Diabetes controlled, A1c has improved to 6.3, patient is currently on metformin reports compliance denies any side effects. Patient denies any hyperglycemic episodes. Patient reports she does not have insurance and she does not monitor her blood sugars on a regular basis. She cannot afford to see ophthalmologist.      Patient is due for foot exam.      COPD, is currently on inhalers, patient is on albuterol inhaler Symbicort. Patient is still smoking, tried Wellbutrin that did not help. Patient is willing to try nicotine patches patient denies any recent flareups complains of wheezing intermittently. Hyperlipidemia, has recent blood work done is currently on statins. The 10-year CVD risk score (D'Agostino, et al., 2008) is: 26.8%    Values used to calculate the score:      Age: 61 years      Sex: Female      Diabetic: Yes      Tobacco smoker: Yes      Systolic Blood Pressure: 376 mmHg      Is BP treated: Yes      HDL Cholesterol: 33 mg/dL      Total Cholesterol: 131 mg/dL      Hypertension controlled, patient is on multiple medications including metoprolol, losartan. Patient is compliant with medications, denies any side effects. Patient has history of chronic low back pain reports she has seen [facility] in the past and was diagnosed with lumbar degenerative disc disease. Patient currently takes Tylenol as needed cannot afford any treatment due to no insurance. Patient reports she is planning to get insurance and will go for further testing. Severe vitamin D deficiency, cannot afford higher dose of vitamin D discussed to take over-the-counter.     /80   Pulse 65

## 2023-04-25 NOTE — PATIENT INSTRUCTIONS
Thank you for choosing [facility]) as your healthcare provider as your care is important to us. Please arrive at your appointment on time. If you are unable to make your appointment, please call our office as soon as possible so that we may reschedule your appointment. Missing 3 appointments in a calendar year without notifying the office may lead to dismissal from the practice. We appreciate you calling at least 24 hours in advance, when possible. Thank you. New Updates for [facility]    Thank you for choosing [facility] to give you the best care! [facility]) is always trying to think of new ways to help their patients. We are asking all patients to try out the new digital registration that is now available through your [facility] account Via the casandra you're now able to update your personal and registration information prior to your upcoming appointment. This will save you time once you arrive at the office to check-in, not to mention your information remains safe!! Many other perks come from signing up for an account, such as:  Requesting refills  Scheduling an appointment  Completing an E-Visit  Sending a message to the office/provider  Having access to your medication list  Paying your bill/copay prior to your appointment  Scheduling your yearly mammogram  Review your test results    If you are not familiar with [facility] please ask one of us and we will be happy to answer any questions or help you set-up your account.       Your [employer],

## 2023-05-10 ENCOUNTER — HOSPITAL ENCOUNTER (OUTPATIENT)
Age: 58
Discharge: HOME | End: 2023-05-12

## 2023-05-10 DIAGNOSIS — Z87.891 PERSONAL HISTORY OF NICOTINE DEPENDENCE: ICD-10-CM

## 2023-05-10 PROCEDURE — 71271 CT THORAX LUNG CANCER SCR C-: CPT

## 2023-05-17 DIAGNOSIS — E11.65 TYPE 2 DIABETES MELLITUS WITH HYPERGLYCEMIA, WITHOUT LONG-TERM CURRENT USE OF INSULIN (HCC): ICD-10-CM

## 2023-05-18 RX ORDER — METFORMIN HYDROCHLORIDE 500 MG/1
TABLET, EXTENDED RELEASE ORAL
Qty: 30 TABLET | Refills: 3 | Status: SHIPPED | OUTPATIENT
Start: 2023-05-18

## 2023-07-31 ENCOUNTER — HOSPITAL ENCOUNTER (OUTPATIENT)
Dept: WOMENS IMAGING | Age: 58
Discharge: HOME | End: 2023-08-02
Payer: COMMERCIAL

## 2023-07-31 DIAGNOSIS — Z12.31 SCREENING MAMMOGRAM FOR HIGH-RISK PATIENT: ICD-10-CM

## 2023-07-31 PROCEDURE — 77063 BREAST TOMOSYNTHESIS BI: CPT

## 2023-08-25 DIAGNOSIS — G89.29 CHRONIC MIDLINE LOW BACK PAIN WITHOUT SCIATICA: ICD-10-CM

## 2023-08-25 DIAGNOSIS — J44.9 MILD CHRONIC OBSTRUCTIVE PULMONARY DISEASE (HCC): ICD-10-CM

## 2023-08-25 DIAGNOSIS — E11.65 TYPE 2 DIABETES MELLITUS WITH HYPERGLYCEMIA, WITHOUT LONG-TERM CURRENT USE OF INSULIN (HCC): ICD-10-CM

## 2023-08-25 DIAGNOSIS — M54.50 CHRONIC MIDLINE LOW BACK PAIN WITHOUT SCIATICA: ICD-10-CM

## 2023-08-25 DIAGNOSIS — M17.0 ARTHRITIS OF BOTH KNEES: ICD-10-CM

## 2023-08-25 RX ORDER — ACETAMINOPHEN 500 MG
500 TABLET ORAL EVERY 6 HOURS PRN
Qty: 120 TABLET | Refills: 0 | Status: SHIPPED | OUTPATIENT
Start: 2023-08-25 | End: 2023-09-24

## 2023-08-25 RX ORDER — ALBUTEROL SULFATE 90 UG/1
2 AEROSOL, METERED RESPIRATORY (INHALATION) EVERY 6 HOURS PRN
Qty: 18 G | Refills: 1 | Status: SHIPPED | OUTPATIENT
Start: 2023-08-25 | End: 2023-09-24

## 2023-08-25 RX ORDER — METFORMIN HYDROCHLORIDE 500 MG/1
TABLET, EXTENDED RELEASE ORAL
Qty: 30 TABLET | Refills: 3 | Status: SHIPPED | OUTPATIENT
Start: 2023-08-25

## 2023-08-25 NOTE — TELEPHONE ENCOUNTER
Please Approve or Refuse.   Send to Pharmacy per Pt's Request:      Next Visit Date:  8/25/2023   Last Visit Date: 4/25/2023    Hemoglobin A1C (%)   Date Value   04/25/2023 6.3   11/17/2022 6.5   06/27/2022 6.2             ( goal A1C is < 7)   BP Readings from Last 3 Encounters:   06/06/23 (!) 156/84   04/25/23 138/80   01/17/23 (!) 156/80          (goal 120/80)  BUN   Date Value Ref Range Status   01/16/2023 12 6 - 20 mg/dL Final     Creatinine   Date Value Ref Range Status   01/16/2023 0.61 0.50 - 0.90 mg/dL Final     Potassium   Date Value Ref Range Status   01/16/2023 3.9 3.7 - 5.3 mmol/L Final

## 2023-08-25 NOTE — TELEPHONE ENCOUNTER
Please Approve or Refuse.   Send to Pharmacy per Pt's Request: [facility]      Next Visit Date:  8/29/2023   Last Visit Date: 4/25/2023    Hemoglobin A1C (%)   Date Value   04/25/2023 6.3   11/17/2022 6.5   06/27/2022 6.2             ( goal A1C is < 7)   BP Readings from Last 3 Encounters:   06/06/23 (!) 156/84   04/25/23 138/80   01/17/23 (!) 156/80          (goal 120/80)  BUN   Date Value Ref Range Status   01/16/2023 12 6 - 20 mg/dL Final     Creatinine   Date Value Ref Range Status   01/16/2023 0.61 0.50 - 0.90 mg/dL Final     Potassium   Date Value Ref Range Status   01/16/2023 3.9 3.7 - 5.3 mmol/L Final

## 2023-09-18 ENCOUNTER — OFFICE VISIT (OUTPATIENT)
Dept: FAMILY MEDICINE CLINIC | Age: 58
End: 2023-09-18
Payer: COMMERCIAL

## 2023-09-18 VITALS
HEART RATE: 77 BPM | BODY MASS INDEX: 35.49 KG/M2 | DIASTOLIC BLOOD PRESSURE: 140 MMHG | WEIGHT: 213 LBS | HEIGHT: 65 IN | OXYGEN SATURATION: 97 % | SYSTOLIC BLOOD PRESSURE: 160 MMHG

## 2023-09-18 DIAGNOSIS — E78.2 MIXED HYPERLIPIDEMIA: ICD-10-CM

## 2023-09-18 DIAGNOSIS — I25.118 CORONARY ARTERY DISEASE OF NATIVE ARTERY OF NATIVE HEART WITH STABLE ANGINA PECTORIS (HCC): ICD-10-CM

## 2023-09-18 DIAGNOSIS — E11.42 TYPE 2 DIABETES MELLITUS WITH DIABETIC POLYNEUROPATHY, WITHOUT LONG-TERM CURRENT USE OF INSULIN (HCC): Primary | ICD-10-CM

## 2023-09-18 DIAGNOSIS — M51.36 DDD (DEGENERATIVE DISC DISEASE), LUMBAR: ICD-10-CM

## 2023-09-18 DIAGNOSIS — J44.9 MILD CHRONIC OBSTRUCTIVE PULMONARY DISEASE (HCC): ICD-10-CM

## 2023-09-18 DIAGNOSIS — E55.9 VITAMIN D DEFICIENCY: ICD-10-CM

## 2023-09-18 DIAGNOSIS — I10 ESSENTIAL HYPERTENSION: ICD-10-CM

## 2023-09-18 PROBLEM — M51.369 DDD (DEGENERATIVE DISC DISEASE), LUMBAR: Status: ACTIVE | Noted: 2023-09-18

## 2023-09-18 LAB — HBA1C MFR BLD: 6.3 %

## 2023-09-18 PROCEDURE — 3044F HG A1C LEVEL LT 7.0%: CPT | Performed by: FAMILY MEDICINE

## 2023-09-18 PROCEDURE — 99214 OFFICE O/P EST MOD 30 MIN: CPT | Performed by: FAMILY MEDICINE

## 2023-09-18 PROCEDURE — 3080F DIAST BP >= 90 MM HG: CPT | Performed by: FAMILY MEDICINE

## 2023-09-18 PROCEDURE — 3077F SYST BP >= 140 MM HG: CPT | Performed by: FAMILY MEDICINE

## 2023-09-18 PROCEDURE — 83036 HEMOGLOBIN GLYCOSYLATED A1C: CPT | Performed by: FAMILY MEDICINE

## 2023-09-18 RX ORDER — ATORVASTATIN CALCIUM 40 MG/1
40 TABLET, FILM COATED ORAL NIGHTLY
Qty: 90 TABLET | Refills: 1 | Status: SHIPPED | OUTPATIENT
Start: 2023-09-18

## 2023-09-18 RX ORDER — GABAPENTIN 100 MG/1
100 CAPSULE ORAL 3 TIMES DAILY
Qty: 90 CAPSULE | Refills: 0 | Status: SHIPPED | OUTPATIENT
Start: 2023-09-18 | End: 2023-10-18

## 2023-09-18 RX ORDER — TIZANIDINE 4 MG/1
4 TABLET ORAL NIGHTLY
Qty: 30 TABLET | Refills: 0 | Status: SHIPPED | OUTPATIENT
Start: 2023-09-18

## 2023-09-18 RX ORDER — LOSARTAN POTASSIUM 25 MG/1
25 TABLET ORAL DAILY
Qty: 90 TABLET | Refills: 1 | Status: SHIPPED | OUTPATIENT
Start: 2023-09-18

## 2023-09-18 ASSESSMENT — ENCOUNTER SYMPTOMS
ABDOMINAL PAIN: 0
CHEST TIGHTNESS: 0
VOMITING: 0
RHINORRHEA: 0
NAUSEA: 0
COUGH: 0
DIARRHEA: 0
STRIDOR: 0
RECTAL PAIN: 0
BLOOD IN STOOL: 0
TROUBLE SWALLOWING: 0
CONSTIPATION: 0
BACK PAIN: 1
EYE REDNESS: 0
ABDOMINAL DISTENTION: 0
SORE THROAT: 0
SINUS PRESSURE: 0
SHORTNESS OF BREATH: 0
WHEEZING: 0
COLOR CHANGE: 0

## 2023-09-18 ASSESSMENT — PATIENT HEALTH QUESTIONNAIRE - PHQ9
SUM OF ALL RESPONSES TO PHQ QUESTIONS 1-9: 0
2. FEELING DOWN, DEPRESSED OR HOPELESS: 0
SUM OF ALL RESPONSES TO PHQ QUESTIONS 1-9: 0
SUM OF ALL RESPONSES TO PHQ QUESTIONS 1-9: 0
1. LITTLE INTEREST OR PLEASURE IN DOING THINGS: 0
SUM OF ALL RESPONSES TO PHQ QUESTIONS 1-9: 0
SUM OF ALL RESPONSES TO PHQ9 QUESTIONS 1 & 2: 0

## 2023-09-18 NOTE — PROGRESS NOTES
Visit Information    Have you changed or started any medications since your last visit including any over-the-counter medicines, vitamins, or herbal medicines? no   Are you having any side effects from any of your medications? -  no  Have you stopped taking any of your medications? Is so, why? -  no    Have you seen any other physician or provider since your last visit? No  Have you had any other diagnostic tests since your last visit? No  Have you been seen in the emergency room and/or had an admission to a hospital since we last saw you? No  Have you had your routine dental cleaning in the past 6 months? Have you activated your [employer] account? If not, what are your barriers?  Yes     Patient Care Team:  Eduardo Inman MD as PCP - General (Family Medicine)  [facility]  Omar Rogers DO as Consulting Physician (Obstetrics & Gynecology)    Medical History Review  Past Medical, Family, and Social History reviewed and does contribute to the patient presenting condition    Health Maintenance   Topic Date Due    Hepatitis B vaccine (1 of 3 - 3-dose series) Never done    Diabetic retinal exam  Never done    DTaP/Tdap/Td vaccine (1 - Tdap) Never done    Shingles vaccine (1 of 2) Never done    COVID-19 Vaccine (3 - Moderna series) 07/16/2021    Pneumococcal 0-64 years Vaccine (2 - PCV) 10/22/2022    Diabetic Alb to Cr ratio (uACR) test  11/01/2022    Flu vaccine (1) Never done    Lipids  11/28/2023    GFR test (Diabetes, CKD 3-4, OR last GFR 15-59)  01/16/2024    Diabetic foot exam  04/25/2024    A1C test (Diabetic or Prediabetic)  04/25/2024    Depression Screen  04/25/2024    Low dose CT lung screening &/or counseling  05/10/2024    Breast cancer screen  07/31/2025    Colorectal Cancer Screen  08/27/2025    Hepatitis C screen  Completed    HIV screen  Completed    Hepatitis A vaccine  Aged Out    Hib vaccine  Aged Out    Meningococcal (ACWY) vaccine  Aged Out    Diabetes

## 2023-09-18 NOTE — PATIENT INSTRUCTIONS
Thank you for choosing [facility]) as your healthcare provider as your care is important to us. Please arrive at your appointment on time. If you are unable to make your appointment, please call our office as soon as possible so that we may reschedule your appointment. Missing 3 appointments in a calendar year without notifying the office may lead to dismissal from the practice. We appreciate you calling at least 24 hours in advance, when possible. Thank you. New Updates for [facility]    Thank you for choosing US to give you the best care! [facility]) is always trying to think of new ways to help their patients. We are asking all patients to try out the new digital registration that is now available through your [facility] account Via the casandra you're now able to update your personal and registration information prior to your upcoming appointment. This will save you time once you arrive at the office to check-in, not to mention your information remains safe!! Many other perks come from signing up for an account, such as:  Requesting refills  Scheduling an appointment  Completing an E-Visit  Sending a message to the office/provider  Having access to your medication list  Paying your bill/copay prior to your appointment  Scheduling your yearly mammogram  Review your test results    If you are not familiar with [facility] please ask one of us and we will be happy to answer any questions or help you set-up your account.       Your [employer],

## 2023-12-13 ENCOUNTER — HOSPITAL ENCOUNTER (OUTPATIENT)
Age: 58
Discharge: HOME | End: 2023-12-13
Payer: COMMERCIAL

## 2023-12-13 DIAGNOSIS — E11.42 TYPE 2 DIABETES MELLITUS WITH DIABETIC POLYNEUROPATHY, WITHOUT LONG-TERM CURRENT USE OF INSULIN (HCC): ICD-10-CM

## 2023-12-13 DIAGNOSIS — E55.9 VITAMIN D DEFICIENCY: ICD-10-CM

## 2023-12-13 DIAGNOSIS — E78.2 MIXED HYPERLIPIDEMIA: ICD-10-CM

## 2023-12-13 LAB
25(OH)D3 SERPL-MCNC: 36.8 NG/ML (ref 30–100)
ALBUMIN SERPL-MCNC: 4.2 G/DL (ref 3.5–5.2)
ALP SERPL-CCNC: 97 U/L (ref 35–104)
ALT SERPL-CCNC: 15 U/L (ref 5–33)
ANION GAP SERPL CALCULATED.3IONS-SCNC: 10 MMOL/L (ref 9–17)
AST SERPL-CCNC: 16 U/L
BACTERIA URNS QL MICRO: ABNORMAL
BASOPHILS # BLD: 0.1 K/UL (ref 0–0.2)
BASOPHILS NFR BLD: 1 % (ref 0–2)
BILIRUB SERPL-MCNC: 0.4 MG/DL (ref 0.3–1.2)
BILIRUB UR QL STRIP: NEGATIVE
BUN SERPL-MCNC: 16 MG/DL (ref 6–20)
CALCIUM SERPL-MCNC: 9.4 MG/DL (ref 8.6–10.4)
CASTS #/AREA URNS LPF: ABNORMAL /LPF
CHLORIDE SERPL-SCNC: 107 MMOL/L (ref 98–107)
CHOLEST SERPL-MCNC: 185 MG/DL
CHOLESTEROL/HDL RATIO: 5
CLARITY UR: CLEAR
CO2 SERPL-SCNC: 28 MMOL/L (ref 20–31)
COLOR UR: YELLOW
CREAT SERPL-MCNC: 0.6 MG/DL (ref 0.5–0.9)
CREAT UR-MCNC: 128.1 MG/DL (ref 28–217)
EOSINOPHIL # BLD: 0.2 K/UL (ref 0–0.4)
EOSINOPHILS RELATIVE PERCENT: 3 % (ref 0–4)
EPI CELLS #/AREA URNS HPF: ABNORMAL /HPF
ERYTHROCYTE [DISTWIDTH] IN BLOOD BY AUTOMATED COUNT: 14.5 % (ref 11.5–14.9)
FOLATE SERPL-MCNC: 7.9 NG/ML (ref 4.8–24.2)
GFR SERPL CREATININE-BSD FRML MDRD: >60 ML/MIN/1.73M2
GLUCOSE SERPL-MCNC: 118 MG/DL (ref 70–99)
GLUCOSE UR STRIP-MCNC: NEGATIVE MG/DL
HCT VFR BLD AUTO: 41.4 % (ref 36–46)
HDLC SERPL-MCNC: 37 MG/DL
HGB BLD-MCNC: 13.6 G/DL (ref 12–16)
HGB UR QL STRIP.AUTO: ABNORMAL
KETONES UR STRIP-MCNC: NEGATIVE MG/DL
LDLC SERPL CALC-MCNC: 121 MG/DL (ref 0–130)
LEUKOCYTE ESTERASE UR QL STRIP: NEGATIVE
LYMPHOCYTES NFR BLD: 3.1 K/UL (ref 1–4.8)
LYMPHOCYTES RELATIVE PERCENT: 34 % (ref 24–44)
MAGNESIUM SERPL-MCNC: 2.2 MG/DL (ref 1.6–2.6)
MCH RBC QN AUTO: 28 PG (ref 26–34)
MCHC RBC AUTO-ENTMCNC: 32.8 G/DL (ref 31–37)
MCV RBC AUTO: 85.4 FL (ref 80–100)
MICROALBUMIN UR-MCNC: <12 MG/L
MICROALBUMIN/CREAT UR-RTO: NORMAL MCG/MG CREAT
MONOCYTES NFR BLD: 0.7 K/UL (ref 0.1–1.3)
MONOCYTES NFR BLD: 7 % (ref 1–7)
NEUTROPHILS NFR BLD: 55 % (ref 36–66)
NEUTS SEG NFR BLD: 5 K/UL (ref 1.3–9.1)
NITRITE UR QL STRIP: NEGATIVE
PH UR STRIP: 6 [PH] (ref 5–8)
PLATELET # BLD AUTO: 219 K/UL (ref 150–450)
PMV BLD AUTO: 9.4 FL (ref 6–12)
POTASSIUM SERPL-SCNC: 3.8 MMOL/L (ref 3.7–5.3)
PROT SERPL-MCNC: 7.5 G/DL (ref 6.4–8.3)
PROT UR STRIP-MCNC: NEGATIVE MG/DL
RBC # BLD AUTO: 4.85 M/UL (ref 4–5.2)
RBC #/AREA URNS HPF: ABNORMAL /HPF
SODIUM SERPL-SCNC: 145 MMOL/L (ref 135–144)
SP GR UR STRIP: 1.02 (ref 1–1.03)
TRIGL SERPL-MCNC: 135 MG/DL
TSH SERPL DL<=0.05 MIU/L-ACNC: 1.15 UIU/ML (ref 0.3–5)
URATE SERPL-MCNC: 5.1 MG/DL (ref 2.4–5.7)
UROBILINOGEN UR STRIP-ACNC: NORMAL EU/DL (ref 0–1)
VIT B12 SERPL-MCNC: 1319 PG/ML (ref 232–1245)
WBC #/AREA URNS HPF: ABNORMAL /HPF
WBC OTHER # BLD: 9 K/UL (ref 3.5–11)

## 2023-12-13 PROCEDURE — 80061 LIPID PANEL: CPT

## 2023-12-13 PROCEDURE — 81001 URINALYSIS AUTO W/SCOPE: CPT

## 2023-12-13 PROCEDURE — 82607 VITAMIN B-12: CPT

## 2023-12-13 PROCEDURE — 84443 ASSAY THYROID STIM HORMONE: CPT

## 2023-12-13 PROCEDURE — 82570 ASSAY OF URINE CREATININE: CPT

## 2023-12-13 PROCEDURE — 36415 COLL VENOUS BLD VENIPUNCTURE: CPT

## 2023-12-13 PROCEDURE — 83735 ASSAY OF MAGNESIUM: CPT

## 2023-12-13 PROCEDURE — 82306 VITAMIN D 25 HYDROXY: CPT

## 2023-12-13 PROCEDURE — 80053 COMPREHEN METABOLIC PANEL: CPT

## 2023-12-13 PROCEDURE — 85025 COMPLETE CBC W/AUTO DIFF WBC: CPT

## 2023-12-13 PROCEDURE — 82043 UR ALBUMIN QUANTITATIVE: CPT

## 2023-12-13 PROCEDURE — 82746 ASSAY OF FOLIC ACID SERUM: CPT

## 2023-12-13 PROCEDURE — 84550 ASSAY OF BLOOD/URIC ACID: CPT

## 2023-12-26 DIAGNOSIS — E11.65 TYPE 2 DIABETES MELLITUS WITH HYPERGLYCEMIA, WITHOUT LONG-TERM CURRENT USE OF INSULIN (HCC): ICD-10-CM

## 2023-12-26 NOTE — TELEPHONE ENCOUNTER
Please Approve or Refuse.   Send to Pharmacy per Pt's Request:      Next Visit Date:  4/18/2024   Last Visit Date: 12/18/2023    Hemoglobin A1C (%)   Date Value   12/18/2023 6.3   09/18/2023 6.3   04/25/2023 6.3             ( goal A1C is < 7)   BP Readings from Last 3 Encounters:   12/18/23 126/80   09/18/23 (!) 160/140   06/06/23 (!) 156/84          (goal 120/80)  BUN   Date Value Ref Range Status   12/13/2023 16 6 - 20 mg/dL Final     Creatinine   Date Value Ref Range Status   12/13/2023 0.6 0.5 - 0.9 mg/dL Final     Potassium   Date Value Ref Range Status   12/13/2023 3.8 3.7 - 5.3 mmol/L Final

## 2023-12-27 RX ORDER — METFORMIN HYDROCHLORIDE 500 MG/1
TABLET, EXTENDED RELEASE ORAL
Qty: 30 TABLET | Refills: 0 | Status: SHIPPED | OUTPATIENT
Start: 2023-12-27

## 2024-02-01 ENCOUNTER — TELEPHONE (OUTPATIENT)
Dept: ORTHOPEDIC SURGERY | Age: 59
End: 2024-02-01

## 2024-02-01 NOTE — TELEPHONE ENCOUNTER
LVM 2/1/24 for patient in regards to getting them scheduled with Mike Galarza with bilateral Knees. provided phone number for patient to return call. AS

## 2024-02-02 ENCOUNTER — TELEPHONE (OUTPATIENT)
Dept: ORTHOPEDIC SURGERY | Age: 59
End: 2024-02-02

## 2024-02-02 NOTE — TELEPHONE ENCOUNTER
spoke with Amy in regards to her 2/29/24@2:45pm with Mike Galarza for bilateral knees at the [place] location. patient verbalized understanding of date, time and location of appt AS

## 2024-02-19 DIAGNOSIS — E11.65 TYPE 2 DIABETES MELLITUS WITH HYPERGLYCEMIA, WITHOUT LONG-TERM CURRENT USE OF INSULIN (HCC): ICD-10-CM

## 2024-02-19 RX ORDER — METFORMIN HYDROCHLORIDE 500 MG/1
TABLET, EXTENDED RELEASE ORAL
Qty: 30 TABLET | Refills: 0 | Status: SHIPPED | OUTPATIENT
Start: 2024-02-19

## 2024-02-19 NOTE — TELEPHONE ENCOUNTER
Please Approve or Refuse.  Send to Pharmacy per Pt's Request: [facility]      Next Visit Date:  4/18/2024   Last Visit Date: 12/18/2023    Hemoglobin A1C (%)   Date Value   12/18/2023 6.3   09/18/2023 6.3   04/25/2023 6.3             ( goal A1C is < 7)   BP Readings from Last 3 Encounters:   12/18/23 126/80   09/18/23 (!) 160/140   06/06/23 (!) 156/84          (goal 120/80)  BUN   Date Value Ref Range Status   12/13/2023 16 6 - 20 mg/dL Final     Creatinine   Date Value Ref Range Status   12/13/2023 0.6 0.5 - 0.9 mg/dL Final     Potassium   Date Value Ref Range Status   12/13/2023 3.8 3.7 - 5.3 mmol/L Final

## 2024-04-05 DIAGNOSIS — I10 ESSENTIAL HYPERTENSION: ICD-10-CM

## 2024-04-05 DIAGNOSIS — E11.65 TYPE 2 DIABETES MELLITUS WITH HYPERGLYCEMIA, WITHOUT LONG-TERM CURRENT USE OF INSULIN (HCC): ICD-10-CM

## 2024-04-05 DIAGNOSIS — J44.9 MILD CHRONIC OBSTRUCTIVE PULMONARY DISEASE (HCC): ICD-10-CM

## 2024-04-05 DIAGNOSIS — G89.29 CHRONIC MIDLINE LOW BACK PAIN WITHOUT SCIATICA: ICD-10-CM

## 2024-04-05 DIAGNOSIS — I25.118 CORONARY ARTERY DISEASE OF NATIVE ARTERY OF NATIVE HEART WITH STABLE ANGINA PECTORIS (HCC): ICD-10-CM

## 2024-04-05 DIAGNOSIS — M54.50 CHRONIC MIDLINE LOW BACK PAIN WITHOUT SCIATICA: ICD-10-CM

## 2024-04-05 DIAGNOSIS — E11.42 TYPE 2 DIABETES MELLITUS WITH DIABETIC POLYNEUROPATHY, WITHOUT LONG-TERM CURRENT USE OF INSULIN (HCC): ICD-10-CM

## 2024-04-05 DIAGNOSIS — M17.0 ARTHRITIS OF BOTH KNEES: ICD-10-CM

## 2024-04-08 RX ORDER — METFORMIN HYDROCHLORIDE 500 MG/1
500 TABLET, EXTENDED RELEASE ORAL
Qty: 30 TABLET | Refills: 0 | Status: SHIPPED | OUTPATIENT
Start: 2024-04-08

## 2024-04-08 RX ORDER — LOSARTAN POTASSIUM 25 MG/1
25 TABLET ORAL DAILY
Qty: 90 TABLET | Refills: 1 | Status: SHIPPED | OUTPATIENT
Start: 2024-04-08

## 2024-04-08 RX ORDER — ACETAMINOPHEN 500 MG
500 TABLET ORAL EVERY 6 HOURS PRN
Qty: 120 TABLET | Refills: 0 | Status: SHIPPED | OUTPATIENT
Start: 2024-04-08 | End: 2024-05-08

## 2024-04-08 RX ORDER — BUDESONIDE AND FORMOTEROL FUMARATE DIHYDRATE 160; 4.5 UG/1; UG/1
2 AEROSOL RESPIRATORY (INHALATION) 2 TIMES DAILY
Qty: 1 EACH | Refills: 1 | Status: SHIPPED | OUTPATIENT
Start: 2024-04-08 | End: 2024-05-08

## 2024-04-08 RX ORDER — ALBUTEROL SULFATE 90 UG/1
2 AEROSOL, METERED RESPIRATORY (INHALATION) EVERY 6 HOURS PRN
Qty: 18 G | Refills: 1 | Status: SHIPPED | OUTPATIENT
Start: 2024-04-08 | End: 2024-05-08

## 2024-04-08 RX ORDER — GABAPENTIN 100 MG/1
100 CAPSULE ORAL 3 TIMES DAILY
Qty: 90 CAPSULE | Refills: 0 | Status: SHIPPED | OUTPATIENT
Start: 2024-04-08 | End: 2024-05-08

## 2024-04-08 NOTE — TELEPHONE ENCOUNTER
Please Approve or Refuse.  Send to Pharmacy per Pt's Request: [email or web address]     Next Visit Date:  4/5/2024   Last Visit Date: 12/18/2023    Hemoglobin A1C (%)   Date Value   12/18/2023 6.3   09/18/2023 6.3   04/25/2023 6.3             ( goal A1C is < 7)   BP Readings from Last 3 Encounters:   12/18/23 126/80   09/18/23 (!) 160/140   06/06/23 (!) 156/84          (goal 120/80)  BUN   Date Value Ref Range Status   12/13/2023 16 6 - 20 mg/dL Final     Creatinine   Date Value Ref Range Status   12/13/2023 0.6 0.5 - 0.9 mg/dL Final     Potassium   Date Value Ref Range Status   12/13/2023 3.8 3.7 - 5.3 mmol/L Final

## 2024-04-08 NOTE — TELEPHONE ENCOUNTER
Please Approve or Refuse.  Send to Pharmacy per Pt's Request:      Next Visit Date:  4/5/2024   Last Visit Date: 12/18/2023    Hemoglobin A1C (%)   Date Value   12/18/2023 6.3   09/18/2023 6.3   04/25/2023 6.3             ( goal A1C is < 7)   BP Readings from Last 3 Encounters:   12/18/23 126/80   09/18/23 (!) 160/140   06/06/23 (!) 156/84          (goal 120/80)  BUN   Date Value Ref Range Status   12/13/2023 16 6 - 20 mg/dL Final     Creatinine   Date Value Ref Range Status   12/13/2023 0.6 0.5 - 0.9 mg/dL Final     Potassium   Date Value Ref Range Status   12/13/2023 3.8 3.7 - 5.3 mmol/L Final

## 2024-04-18 ENCOUNTER — OFFICE VISIT (OUTPATIENT)
Dept: FAMILY MEDICINE CLINIC | Age: 59
End: 2024-04-18
Payer: COMMERCIAL

## 2024-04-18 VITALS
HEART RATE: 86 BPM | OXYGEN SATURATION: 95 % | HEIGHT: 65 IN | BODY MASS INDEX: 33.36 KG/M2 | DIASTOLIC BLOOD PRESSURE: 88 MMHG | WEIGHT: 200.2 LBS | SYSTOLIC BLOOD PRESSURE: 138 MMHG

## 2024-04-18 DIAGNOSIS — I86.8 VARICOSE VEINS OF BOTH UPPER EXTREMITIES: ICD-10-CM

## 2024-04-18 DIAGNOSIS — B35.1 ONYCHOMYCOSIS: ICD-10-CM

## 2024-04-18 DIAGNOSIS — E11.42 TYPE 2 DIABETES MELLITUS WITH DIABETIC POLYNEUROPATHY, WITHOUT LONG-TERM CURRENT USE OF INSULIN (HCC): Primary | ICD-10-CM

## 2024-04-18 DIAGNOSIS — I25.118 CORONARY ARTERY DISEASE OF NATIVE ARTERY OF NATIVE HEART WITH STABLE ANGINA PECTORIS (HCC): ICD-10-CM

## 2024-04-18 DIAGNOSIS — I10 ESSENTIAL HYPERTENSION: ICD-10-CM

## 2024-04-18 DIAGNOSIS — M51.36 DDD (DEGENERATIVE DISC DISEASE), LUMBAR: ICD-10-CM

## 2024-04-18 DIAGNOSIS — J44.9 MILD CHRONIC OBSTRUCTIVE PULMONARY DISEASE (HCC): ICD-10-CM

## 2024-04-18 DIAGNOSIS — Z12.31 SCREENING MAMMOGRAM FOR HIGH-RISK PATIENT: ICD-10-CM

## 2024-04-18 DIAGNOSIS — Z23 ENCOUNTER FOR IMMUNIZATION: ICD-10-CM

## 2024-04-18 DIAGNOSIS — E78.2 MIXED HYPERLIPIDEMIA: ICD-10-CM

## 2024-04-18 DIAGNOSIS — I83.813 VARICOSE VEINS OF BOTH LOWER EXTREMITIES WITH PAIN: ICD-10-CM

## 2024-04-18 LAB — HBA1C MFR BLD: 6.8 %

## 2024-04-18 PROCEDURE — 3079F DIAST BP 80-89 MM HG: CPT | Performed by: FAMILY MEDICINE

## 2024-04-18 PROCEDURE — 3023F SPIROM DOC REV: CPT | Performed by: FAMILY MEDICINE

## 2024-04-18 PROCEDURE — 3075F SYST BP GE 130 - 139MM HG: CPT | Performed by: FAMILY MEDICINE

## 2024-04-18 PROCEDURE — 3044F HG A1C LEVEL LT 7.0%: CPT | Performed by: FAMILY MEDICINE

## 2024-04-18 PROCEDURE — 83036 HEMOGLOBIN GLYCOSYLATED A1C: CPT | Performed by: FAMILY MEDICINE

## 2024-04-18 PROCEDURE — 3017F COLORECTAL CA SCREEN DOC REV: CPT | Performed by: FAMILY MEDICINE

## 2024-04-18 PROCEDURE — G8417 CALC BMI ABV UP PARAM F/U: HCPCS | Performed by: FAMILY MEDICINE

## 2024-04-18 PROCEDURE — 99214 OFFICE O/P EST MOD 30 MIN: CPT | Performed by: FAMILY MEDICINE

## 2024-04-18 PROCEDURE — 4004F PT TOBACCO SCREEN RCVD TLK: CPT | Performed by: FAMILY MEDICINE

## 2024-04-18 PROCEDURE — 2022F DILAT RTA XM EVC RTNOPTHY: CPT | Performed by: FAMILY MEDICINE

## 2024-04-18 PROCEDURE — G8427 DOCREV CUR MEDS BY ELIG CLIN: HCPCS | Performed by: FAMILY MEDICINE

## 2024-04-18 RX ORDER — METFORMIN HYDROCHLORIDE 500 MG/1
500 TABLET, EXTENDED RELEASE ORAL
Qty: 90 TABLET | Refills: 1 | Status: SHIPPED | OUTPATIENT
Start: 2024-04-18

## 2024-04-18 RX ORDER — ATORVASTATIN CALCIUM 40 MG/1
40 TABLET, FILM COATED ORAL NIGHTLY
Qty: 90 TABLET | Refills: 1 | Status: SHIPPED | OUTPATIENT
Start: 2024-04-18

## 2024-04-18 RX ORDER — ASPIRIN 81 MG/1
81 TABLET ORAL DAILY
Qty: 90 TABLET | Refills: 1 | Status: SHIPPED | OUTPATIENT
Start: 2024-04-18

## 2024-04-18 RX ORDER — GABAPENTIN 300 MG/1
300 CAPSULE ORAL 2 TIMES DAILY
Qty: 60 CAPSULE | Refills: 0 | Status: SHIPPED | OUTPATIENT
Start: 2024-04-18 | End: 2024-05-18

## 2024-04-18 SDOH — ECONOMIC STABILITY: INCOME INSECURITY: HOW HARD IS IT FOR YOU TO PAY FOR THE VERY BASICS LIKE FOOD, HOUSING, MEDICAL CARE, AND HEATING?: SOMEWHAT HARD

## 2024-04-18 SDOH — ECONOMIC STABILITY: FOOD INSECURITY: WITHIN THE PAST 12 MONTHS, YOU WORRIED THAT YOUR FOOD WOULD RUN OUT BEFORE YOU GOT MONEY TO BUY MORE.: OFTEN TRUE

## 2024-04-18 SDOH — ECONOMIC STABILITY: FOOD INSECURITY: WITHIN THE PAST 12 MONTHS, THE FOOD YOU BOUGHT JUST DIDN'T LAST AND YOU DIDN'T HAVE MONEY TO GET MORE.: OFTEN TRUE

## 2024-04-18 ASSESSMENT — ENCOUNTER SYMPTOMS
COUGH: 0
NAUSEA: 0
SHORTNESS OF BREATH: 1
DIARRHEA: 0
SINUS PRESSURE: 0
RECTAL PAIN: 0
BLOOD IN STOOL: 0
VOMITING: 0
EYE REDNESS: 0
ABDOMINAL PAIN: 0
STRIDOR: 0
TROUBLE SWALLOWING: 0
BACK PAIN: 1
WHEEZING: 0
ABDOMINAL DISTENTION: 0
CHEST TIGHTNESS: 0
RHINORRHEA: 0
SORE THROAT: 0
CONSTIPATION: 0
COLOR CHANGE: 0

## 2024-04-18 ASSESSMENT — PATIENT HEALTH QUESTIONNAIRE - PHQ9
SUM OF ALL RESPONSES TO PHQ QUESTIONS 1-9: 0
SUM OF ALL RESPONSES TO PHQ9 QUESTIONS 1 & 2: 0
SUM OF ALL RESPONSES TO PHQ QUESTIONS 1-9: 0
SUM OF ALL RESPONSES TO PHQ QUESTIONS 1-9: 0
1. LITTLE INTEREST OR PLEASURE IN DOING THINGS: NOT AT ALL
SUM OF ALL RESPONSES TO PHQ QUESTIONS 1-9: 0

## 2024-04-18 NOTE — PROGRESS NOTES
Visit Information    Have you changed or started any medications since your last visit including any over-the-counter medicines, vitamins, or herbal medicines? no   Have you stopped taking any of your medications? Is so, why? -  no  Are you having any side effects from any of your medications? - no    Have you seen any other physician or provider since your last visit?  yes     Have you had any other diagnostic tests since your last visit?  no   Have you been seen in the emergency room and/or had an admission in a hospital since we last saw you?  no   Have you had your routine dental cleaning in the past 6 months?  no     Do you have an active MyChart account? If no, what is the barrier?  Yes    Patient Care Team:  Jacinda Dietz MD as PCP - General (Family Medicine)  Jacinda Dietz MD as PCP - Empaneled Provider  Magen Montez DO as Consulting Physician (Obstetrics & Gynecology)    Medical History Review  Past Medical, Family, and Social History reviewed and does contribute to the patient presenting condition    Health Maintenance   Topic Date Due    Hepatitis B vaccine (1 of 3 - 3-dose series) Never done    Diabetic retinal exam  Never done    DTaP/Tdap/Td vaccine (1 - Tdap) Never done    Shingles vaccine (1 of 2) Never done    Pneumococcal 0-64 years Vaccine (2 of 2 - PCV) 10/22/2022    COVID-19 Vaccine (3 - 2023-24 season) 09/01/2023    A1C test (Diabetic or Prediabetic)  03/18/2024    Diabetic foot exam  04/25/2024    Low dose CT lung screening &/or counseling  05/10/2024    Flu vaccine (Season Ended) 08/01/2024    Diabetic Alb to Cr ratio (uACR) test  12/13/2024    Lipids  12/13/2024    GFR test (Diabetes, CKD 3-4, OR last GFR 15-59)  12/13/2024    Depression Screen  12/18/2024    Breast cancer screen  07/31/2025    Colorectal Cancer Screen  08/27/2025    Hepatitis C screen  Completed    HIV screen  Completed    Hepatitis A vaccine  Aged Out    Hib vaccine  Aged Out    Polio vaccine  Aged Out    
depression, 20-27 = Severe depression    The patient'spast medical, surgical, social, and family history as well as her   current medications and allergies were reviewed as documented in today's encounter.      Medications, labs, diagnostic studies, consultations andfollow-up as documented in this encounter.    Return in about 3 months (around 7/18/2024) for 15 MIN RILEY, chronic conditions, dm ,htn, hld.    Patient wasseen with total face to face time of 35 minutes. More than 50% of this visit was counseling and education.       Future Appointments   Date Time Provider Department Center   5/14/2024 10:00 AM STV CT [facility] Radiolog   7/19/2024 11:45 AM [facility]     This note was completed by using the assistance of a speech-recognition program. However, inadvertent computerized transcription errors may be present. Althoughevery effort was made to ensure accuracy, no guarantees can be provided that every mistake has been identified and corrected by editing.  Electronically signed by Jacinda Dietz MD on 4/18/2024  10:52 AM

## 2024-04-18 NOTE — PATIENT INSTRUCTIONS
Dear valued patient,    We hope this message finds you in good health. At [], we are committed to providing you with the best possible healthcare experience. To further enhance your convenience and streamline your access to our services, we would like to introduce you to the benefits of utilizing direct scheduling through the [facility] Patient Portal.    Direct scheduling is a feature within the [facility] Patient Portal that allows you to schedule appointments with your healthcare provider directly, without the need to make a phone call or wait for a callback. We understand that your time is cyril, and we want to ensure that you have quick and easy access to our services whenever you need them.  Here are some reasons why you should consider utilizing direct scheduling through the [facility] Patient Portal:    1. Convenience: With direct scheduling, you can book appointments at any time that suits you best, 24 hours a day, 7 days a week. No more waiting on hold or playing phone tag with our office staff. It puts you in control of managing your healthcare appointments effortlessly.    2. Accessibility: The [facility] Patient Portal is accessible through your computer, smartphone, or tablet, allowing you to schedule appointments from the comfort of your home, office, or on the go. You can access your medical records, review test results, and communicate with your healthcare provider all in one secure and user-friendly platform.    3. Timesaving: By utilizing direct scheduling, you can avoid unnecessary delays and save cyril time. You can easily browse the available appointment slots and select the one that works best for you, eliminating the back-and-forth communication typically required when scheduling via phone.    4. Reminders and notifications: [facility] Portal sends automatic reminders for upcoming appointments, ensuring that you never miss an important visit. You can also receive notifications about test

## 2024-05-07 ENCOUNTER — TELEPHONE (OUTPATIENT)
Dept: ONCOLOGY | Age: 59
End: 2024-05-07

## 2024-05-13 DIAGNOSIS — M51.36 DDD (DEGENERATIVE DISC DISEASE), LUMBAR: ICD-10-CM

## 2024-05-13 RX ORDER — DICLOFENAC SODIUM 10 MG/G
GEL TOPICAL
Qty: 200 G | Refills: 0 | Status: SHIPPED | OUTPATIENT
Start: 2024-05-13

## 2024-05-14 ENCOUNTER — HOSPITAL ENCOUNTER (OUTPATIENT)
Age: 59
Discharge: HOME | End: 2024-05-16
Payer: COMMERCIAL

## 2024-05-14 DIAGNOSIS — E11.42 TYPE 2 DIABETES MELLITUS WITH DIABETIC POLYNEUROPATHY, WITHOUT LONG-TERM CURRENT USE OF INSULIN (HCC): ICD-10-CM

## 2024-05-14 DIAGNOSIS — Z87.891 PERSONAL HISTORY OF TOBACCO USE: ICD-10-CM

## 2024-05-14 PROCEDURE — 71271 CT THORAX LUNG CANCER SCR C-: CPT

## 2024-05-14 RX ORDER — GABAPENTIN 300 MG/1
300 CAPSULE ORAL 2 TIMES DAILY
Qty: 60 CAPSULE | Refills: 0 | Status: SHIPPED | OUTPATIENT
Start: 2024-05-14 | End: 2024-06-13

## 2024-05-14 NOTE — TELEPHONE ENCOUNTER
Please Approve or Refuse.  Send to Pharmacy per Pt's Request: [facility]      Next Visit Date:  7/19/2024   Last Visit Date: 4/18/2024    Hemoglobin A1C (%)   Date Value   04/18/2024 6.8   12/18/2023 6.3   09/18/2023 6.3             ( goal A1C is < 7)   BP Readings from Last 3 Encounters:   04/18/24 138/88   12/18/23 126/80   09/18/23 (!) 160/140          (goal 120/80)  BUN   Date Value Ref Range Status   12/13/2023 16 6 - 20 mg/dL Final     Creatinine   Date Value Ref Range Status   12/13/2023 0.6 0.5 - 0.9 mg/dL Final     Potassium   Date Value Ref Range Status   12/13/2023 3.8 3.7 - 5.3 mmol/L Final

## 2024-05-24 DIAGNOSIS — M54.50 CHRONIC MIDLINE LOW BACK PAIN WITHOUT SCIATICA: ICD-10-CM

## 2024-05-24 DIAGNOSIS — I25.118 CORONARY ARTERY DISEASE OF NATIVE ARTERY OF NATIVE HEART WITH STABLE ANGINA PECTORIS (HCC): ICD-10-CM

## 2024-05-24 DIAGNOSIS — M17.0 ARTHRITIS OF BOTH KNEES: ICD-10-CM

## 2024-05-24 DIAGNOSIS — G89.29 CHRONIC MIDLINE LOW BACK PAIN WITHOUT SCIATICA: ICD-10-CM

## 2024-05-26 ENCOUNTER — HOSPITAL ENCOUNTER (EMERGENCY)
Age: 59
Discharge: HOME | End: 2024-05-26
Attending: EMERGENCY MEDICINE
Payer: COMMERCIAL

## 2024-05-26 ENCOUNTER — APPOINTMENT (OUTPATIENT)
Dept: GENERAL RADIOLOGY | Age: 59
End: 2024-05-26
Attending: EMERGENCY MEDICINE
Payer: COMMERCIAL

## 2024-05-26 VITALS
OXYGEN SATURATION: 94 % | HEART RATE: 60 BPM | TEMPERATURE: 97.7 F | SYSTOLIC BLOOD PRESSURE: 152 MMHG | RESPIRATION RATE: 18 BRPM | BODY MASS INDEX: 32.14 KG/M2 | HEIGHT: 66 IN | WEIGHT: 200 LBS | DIASTOLIC BLOOD PRESSURE: 74 MMHG

## 2024-05-26 DIAGNOSIS — S62.001A CLOSED NONDISPLACED FRACTURE OF SCAPHOID OF RIGHT WRIST, UNSPECIFIED PORTION OF SCAPHOID, INITIAL ENCOUNTER: Primary | ICD-10-CM

## 2024-05-26 PROCEDURE — 73130 X-RAY EXAM OF HAND: CPT

## 2024-05-26 PROCEDURE — 29125 APPL SHORT ARM SPLINT STATIC: CPT

## 2024-05-26 PROCEDURE — 73110 X-RAY EXAM OF WRIST: CPT

## 2024-05-26 PROCEDURE — 99283 EMERGENCY DEPT VISIT LOW MDM: CPT

## 2024-05-26 ASSESSMENT — LIFESTYLE VARIABLES
HOW OFTEN DO YOU HAVE A DRINK CONTAINING ALCOHOL: NEVER
HOW MANY STANDARD DRINKS CONTAINING ALCOHOL DO YOU HAVE ON A TYPICAL DAY: PATIENT DOES NOT DRINK

## 2024-05-26 ASSESSMENT — PAIN DESCRIPTION - LOCATION: LOCATION: WRIST

## 2024-05-26 ASSESSMENT — PAIN - FUNCTIONAL ASSESSMENT: PAIN_FUNCTIONAL_ASSESSMENT: 0-10

## 2024-05-26 ASSESSMENT — PAIN DESCRIPTION - PAIN TYPE: TYPE: ACUTE PAIN

## 2024-05-26 ASSESSMENT — PAIN DESCRIPTION - DESCRIPTORS: DESCRIPTORS: ACHING

## 2024-05-26 ASSESSMENT — PAIN DESCRIPTION - ORIENTATION: ORIENTATION: RIGHT

## 2024-05-26 ASSESSMENT — PAIN SCALES - GENERAL: PAINLEVEL_OUTOF10: 8

## 2024-05-26 NOTE — ED PROVIDER NOTES
Details   gabapentin (NEURONTIN) 300 MG capsule Take 1 capsule by mouth 2 times daily for 30 days., Disp-60 capsule, R-0Normal      EQ ARTHRITIS PAIN RELIEVER 1 % GEL APPLY 2 GRAMS  TOPICALLY 4 TIMES DAILY, Disp-200 g, R-0Normal      atorvastatin (LIPITOR) 40 MG tablet Take 1 tablet by mouth nightly, Disp-90 tablet, R-1Normal      metFORMIN (GLUCOPHAGE-XR) 500 MG extended release tablet Take 1 tablet by mouth daily (with breakfast), Disp-90 tablet, R-1Normal      aspirin 81 MG EC tablet Take 1 tablet by mouth daily, Disp-90 tablet, R-1Normal      losartan (COZAAR) 25 MG tablet Take 1 tablet by mouth daily, Disp-90 tablet, R-1Normal      acetaminophen (TYLENOL) 500 MG tablet Take 1 tablet by mouth every 6 hours as needed for Pain, Disp-120 tablet, R-0Normal      clopidogrel (PLAVIX) 75 MG tablet Take 1 tablet by mouth daily CALL FOR APPOINTMENT, Disp-90 tablet, R-0Normal      albuterol sulfate HFA (PROVENTIL;VENTOLIN;PROAIR) 108 (90 Base) MCG/ACT inhaler Inhale 2 puffs into the lungs every 6 hours as needed for Wheezing or Shortness of Breath, Disp-18 g, R-1Normal      budesonide-formoterol (SYMBICORT) 160-4.5 MCG/ACT AERO Inhale 2 puffs into the lungs 2 times daily, Disp-1 each, R-1Normal      !! varenicline (CHANTIX) 1 MG tablet Take 1 tablet by mouth 2 times daily, Disp-180 tablet, R-1Normal      !! varenicline (CHANTIX) 0.5 MG tablet Take 1-2 tablets by mouth See Admin Instructions 0.5mg DAILY for 3 days followed by 0.5mg TWICE DAILY for 4 days followed by 1mg TWICE DAILY, Disp-57 tablet, R-0Normal      blood glucose monitor kit and supplies Test one time a day & as needed for symptoms of irregular blood glucose., Disp-1 kit, R-0, Normal      !! Lancets MISC Disp-100 each, R-3, NormalTesting once a day.  Please dispense according to patients insurance.      blood glucose monitor strips Testing once a day.  Please dispense according to patients insurance., Disp-100 strip, R-3, Normal      metoprolol tartrate

## 2024-05-28 RX ORDER — ACETAMINOPHEN 500 MG
500 TABLET ORAL EVERY 6 HOURS PRN
Qty: 120 TABLET | Refills: 0 | Status: SHIPPED | OUTPATIENT
Start: 2024-05-28 | End: 2024-06-27

## 2024-05-28 NOTE — TELEPHONE ENCOUNTER
Please Approve or Refuse.  Send to Pharmacy per Pt's Request: [facility]      Next Visit Date:  5/24/2024   Last Visit Date: 4/18/2024    Hemoglobin A1C (%)   Date Value   04/18/2024 6.8   12/18/2023 6.3   09/18/2023 6.3             ( goal A1C is < 7)   BP Readings from Last 3 Encounters:   05/26/24 (!) 152/74   04/18/24 138/88   12/18/23 126/80          (goal 120/80)  BUN   Date Value Ref Range Status   12/13/2023 16 6 - 20 mg/dL Final     Creatinine   Date Value Ref Range Status   12/13/2023 0.6 0.5 - 0.9 mg/dL Final     Potassium   Date Value Ref Range Status   12/13/2023 3.8 3.7 - 5.3 mmol/L Final

## 2024-05-28 NOTE — TELEPHONE ENCOUNTER
Please Approve or Refuse.  Send to Pharmacy per Pt's Request: [facility]     Next Visit Date:  7/19/2024   Last Visit Date: 4/18/2024    Hemoglobin A1C (%)   Date Value   04/18/2024 6.8   12/18/2023 6.3   09/18/2023 6.3             ( goal A1C is < 7)   BP Readings from Last 3 Encounters:   05/26/24 (!) 152/74   04/18/24 138/88   12/18/23 126/80          (goal 120/80)  BUN   Date Value Ref Range Status   12/13/2023 16 6 - 20 mg/dL Final     Creatinine   Date Value Ref Range Status   12/13/2023 0.6 0.5 - 0.9 mg/dL Final     Potassium   Date Value Ref Range Status   12/13/2023 3.8 3.7 - 5.3 mmol/L Final

## 2024-06-04 ENCOUNTER — OFFICE VISIT (OUTPATIENT)
Age: 59
End: 2024-06-04
Payer: COMMERCIAL

## 2024-06-04 VITALS — HEIGHT: 66 IN | BODY MASS INDEX: 32.14 KG/M2 | WEIGHT: 200 LBS

## 2024-06-04 DIAGNOSIS — M25.531 RIGHT WRIST PAIN: ICD-10-CM

## 2024-06-04 DIAGNOSIS — S62.101D CLOSED FRACTURE OF RIGHT WRIST WITH ROUTINE HEALING, SUBSEQUENT ENCOUNTER: Primary | ICD-10-CM

## 2024-06-04 PROCEDURE — G8417 CALC BMI ABV UP PARAM F/U: HCPCS | Performed by: ORTHOPAEDIC SURGERY

## 2024-06-04 PROCEDURE — 4004F PT TOBACCO SCREEN RCVD TLK: CPT | Performed by: ORTHOPAEDIC SURGERY

## 2024-06-04 PROCEDURE — 3017F COLORECTAL CA SCREEN DOC REV: CPT | Performed by: ORTHOPAEDIC SURGERY

## 2024-06-04 PROCEDURE — 99204 OFFICE O/P NEW MOD 45 MIN: CPT | Performed by: ORTHOPAEDIC SURGERY

## 2024-06-04 PROCEDURE — G8427 DOCREV CUR MEDS BY ELIG CLIN: HCPCS | Performed by: ORTHOPAEDIC SURGERY

## 2024-06-04 NOTE — PROGRESS NOTES
[facility]      [facility] PHYSICIANS [facility]  [facility]  [address]  [place] [address]  Dept: [phone]  Dept Fax: [phone]    Chief Compliant:  Chief Complaint   Patient presents with    Fracture        History of Present Illness:  This is a pleasant 58 y.o. female who is here for evaluation of her right wrist.  She fell over a week ago landing on her right wrist.  There was some concern at the time of her injury of potential nondisplaced scaphoid fracture.  She was placed in a thumb spica splint.  She has had that on.  She states pain continues to be significant.  She takes chronic gabapentin.  She localizes the pain over the dorsal radial aspect of the wrist.    Physical Exam: The right wrist has no significant swelling.  She has tenderness to the anatomic snuffbox and the scaphoid tubercle.  She has pain with range of motion of the thumb.  There is no tenderness to the distal radius.    Imaging: X-rays of the right wrist taken a week or so ago elsewhere were independently reviewed.  These show slight abnormality of the scaphoid waist.    New x-rays 4 views of the right wrist taken today show no obvious fracture of the scaphoid.  There is no distal radius fracture.      Assessment and Plan:    This is a pleasant 58 y.o. female who has continued pain and symptoms consistent with potential nondisplaced scaphoid waist fracture.  Given that she continues to have the symptoms we will get an MRI of the right wrist to further evaluate for nondisplaced scaphoid waist fracture.  This will help guide further treatment         Past History:    Current Outpatient Medications:     acetaminophen (TYLENOL) 500 MG tablet, Take 1 tablet by mouth every 6 hours as needed for Pain, Disp: 120 tablet, Rfl: 0    metoprolol tartrate (LOPRESSOR) 25 MG tablet, Take 1 tablet by mouth 2 times daily, Disp: 180 tablet, Rfl: 1    Labs/Medications

## 2024-07-14 ENCOUNTER — APPOINTMENT (OUTPATIENT)
Dept: GENERAL RADIOLOGY | Age: 59
End: 2024-07-14
Payer: COMMERCIAL

## 2024-07-14 ENCOUNTER — HOSPITAL ENCOUNTER (OUTPATIENT)
Age: 59
Setting detail: OBSERVATION
Discharge: HOME | End: 2024-07-15
Attending: EMERGENCY MEDICINE | Admitting: EMERGENCY MEDICINE
Payer: COMMERCIAL

## 2024-07-14 DIAGNOSIS — R07.9 CHEST PAIN, UNSPECIFIED TYPE: Primary | ICD-10-CM

## 2024-07-14 DIAGNOSIS — I25.118 CORONARY ARTERY DISEASE OF NATIVE ARTERY OF NATIVE HEART WITH STABLE ANGINA PECTORIS (HCC): ICD-10-CM

## 2024-07-14 DIAGNOSIS — R06.02 SHORTNESS OF BREATH: ICD-10-CM

## 2024-07-14 DIAGNOSIS — I20.9 ANGINA PECTORIS (HCC): ICD-10-CM

## 2024-07-14 DIAGNOSIS — E11.42 TYPE 2 DIABETES MELLITUS WITH DIABETIC POLYNEUROPATHY, WITHOUT LONG-TERM CURRENT USE OF INSULIN (HCC): ICD-10-CM

## 2024-07-14 LAB
ANION GAP SERPL CALCULATED.3IONS-SCNC: 12 MMOL/L (ref 9–16)
BASOPHILS # BLD: 0.07 K/UL (ref 0–0.2)
BASOPHILS NFR BLD: 1 % (ref 0–2)
BUN SERPL-MCNC: 20 MG/DL (ref 6–20)
CALCIUM SERPL-MCNC: 9.8 MG/DL (ref 8.6–10.4)
CHLORIDE SERPL-SCNC: 106 MMOL/L (ref 98–107)
CO2 SERPL-SCNC: 23 MMOL/L (ref 20–31)
CREAT SERPL-MCNC: 0.6 MG/DL (ref 0.5–0.9)
EOSINOPHIL # BLD: 0.22 K/UL (ref 0–0.44)
EOSINOPHILS RELATIVE PERCENT: 2 % (ref 1–4)
ERYTHROCYTE [DISTWIDTH] IN BLOOD BY AUTOMATED COUNT: 13.6 % (ref 11.8–14.4)
GFR, ESTIMATED: >90 ML/MIN/1.73M2
GLUCOSE SERPL-MCNC: 125 MG/DL (ref 74–99)
HCT VFR BLD AUTO: 40.4 % (ref 36.3–47.1)
HGB BLD-MCNC: 13.2 G/DL (ref 11.9–15.1)
IMM GRANULOCYTES # BLD AUTO: <0.03 K/UL (ref 0–0.3)
IMM GRANULOCYTES NFR BLD: 0 %
LYMPHOCYTES NFR BLD: 3.96 K/UL (ref 1.1–3.7)
LYMPHOCYTES RELATIVE PERCENT: 42 % (ref 24–43)
MCH RBC QN AUTO: 28.1 PG (ref 25.2–33.5)
MCHC RBC AUTO-ENTMCNC: 32.7 G/DL (ref 28.4–34.8)
MCV RBC AUTO: 86 FL (ref 82.6–102.9)
MONOCYTES NFR BLD: 0.61 K/UL (ref 0.1–1.2)
MONOCYTES NFR BLD: 7 % (ref 3–12)
NEUTROPHILS NFR BLD: 48 % (ref 36–65)
NEUTS SEG NFR BLD: 4.45 K/UL (ref 1.5–8.1)
NRBC BLD-RTO: 0 PER 100 WBC
PLATELET # BLD AUTO: 193 K/UL (ref 138–453)
PMV BLD AUTO: 11.5 FL (ref 8.1–13.5)
POTASSIUM SERPL-SCNC: 3.7 MMOL/L (ref 3.7–5.3)
RBC # BLD AUTO: 4.7 M/UL (ref 3.95–5.11)
SODIUM SERPL-SCNC: 141 MMOL/L (ref 136–145)
TROPONIN I SERPL HS-MCNC: <6 NG/L (ref 0–14)
TROPONIN I SERPL HS-MCNC: <6 NG/L (ref 0–14)
WBC OTHER # BLD: 9.3 K/UL (ref 3.5–11.3)

## 2024-07-14 PROCEDURE — 6370000000 HC RX 637 (ALT 250 FOR IP)

## 2024-07-14 PROCEDURE — 84484 ASSAY OF TROPONIN QUANT: CPT

## 2024-07-14 PROCEDURE — 85025 COMPLETE CBC W/AUTO DIFF WBC: CPT

## 2024-07-14 PROCEDURE — 2580000003 HC RX 258

## 2024-07-14 PROCEDURE — 99285 EMERGENCY DEPT VISIT HI MDM: CPT

## 2024-07-14 PROCEDURE — 71046 X-RAY EXAM CHEST 2 VIEWS: CPT

## 2024-07-14 PROCEDURE — 80048 BASIC METABOLIC PNL TOTAL CA: CPT

## 2024-07-14 PROCEDURE — 93005 ELECTROCARDIOGRAM TRACING: CPT

## 2024-07-14 RX ORDER — SODIUM CHLORIDE 0.9 % (FLUSH) 0.9 %
5-40 SYRINGE (ML) INJECTION EVERY 12 HOURS SCHEDULED
Status: DISCONTINUED | OUTPATIENT
Start: 2024-07-15 | End: 2024-07-15 | Stop reason: HOSPADM

## 2024-07-14 RX ORDER — 0.9 % SODIUM CHLORIDE 0.9 %
1000 INTRAVENOUS SOLUTION INTRAVENOUS ONCE
Status: COMPLETED | OUTPATIENT
Start: 2024-07-14 | End: 2024-07-14

## 2024-07-14 RX ORDER — ACETAMINOPHEN 650 MG/1
650 SUPPOSITORY RECTAL EVERY 6 HOURS PRN
Status: DISCONTINUED | OUTPATIENT
Start: 2024-07-14 | End: 2024-07-15 | Stop reason: HOSPADM

## 2024-07-14 RX ORDER — ASPIRIN 81 MG/1
324 TABLET, CHEWABLE ORAL ONCE
Status: COMPLETED | OUTPATIENT
Start: 2024-07-14 | End: 2024-07-14

## 2024-07-14 RX ORDER — POLYETHYLENE GLYCOL 3350 17 G/17G
17 POWDER, FOR SOLUTION ORAL DAILY PRN
Status: DISCONTINUED | OUTPATIENT
Start: 2024-07-14 | End: 2024-07-15 | Stop reason: HOSPADM

## 2024-07-14 RX ORDER — LANOLIN ALCOHOL/MO/W.PET/CERES
3 CREAM (GRAM) TOPICAL NIGHTLY
Status: DISCONTINUED | OUTPATIENT
Start: 2024-07-15 | End: 2024-07-15 | Stop reason: HOSPADM

## 2024-07-14 RX ORDER — ONDANSETRON 2 MG/ML
4 INJECTION INTRAMUSCULAR; INTRAVENOUS EVERY 6 HOURS PRN
Status: DISCONTINUED | OUTPATIENT
Start: 2024-07-14 | End: 2024-07-15 | Stop reason: HOSPADM

## 2024-07-14 RX ORDER — ENOXAPARIN SODIUM 100 MG/ML
40 INJECTION SUBCUTANEOUS DAILY
Status: DISCONTINUED | OUTPATIENT
Start: 2024-07-15 | End: 2024-07-15 | Stop reason: HOSPADM

## 2024-07-14 RX ORDER — POTASSIUM CHLORIDE 20 MEQ/1
40 TABLET, EXTENDED RELEASE ORAL PRN
Status: DISCONTINUED | OUTPATIENT
Start: 2024-07-14 | End: 2024-07-15 | Stop reason: HOSPADM

## 2024-07-14 RX ORDER — ONDANSETRON 4 MG/1
4 TABLET, ORALLY DISINTEGRATING ORAL EVERY 8 HOURS PRN
Status: DISCONTINUED | OUTPATIENT
Start: 2024-07-14 | End: 2024-07-15 | Stop reason: HOSPADM

## 2024-07-14 RX ORDER — MAGNESIUM SULFATE IN WATER 40 MG/ML
2000 INJECTION, SOLUTION INTRAVENOUS PRN
Status: DISCONTINUED | OUTPATIENT
Start: 2024-07-14 | End: 2024-07-15 | Stop reason: HOSPADM

## 2024-07-14 RX ORDER — POTASSIUM CHLORIDE 7.45 MG/ML
10 INJECTION INTRAVENOUS PRN
Status: DISCONTINUED | OUTPATIENT
Start: 2024-07-14 | End: 2024-07-15 | Stop reason: HOSPADM

## 2024-07-14 RX ORDER — SODIUM CHLORIDE 0.9 % (FLUSH) 0.9 %
5-40 SYRINGE (ML) INJECTION PRN
Status: DISCONTINUED | OUTPATIENT
Start: 2024-07-14 | End: 2024-07-15 | Stop reason: HOSPADM

## 2024-07-14 RX ORDER — ACETAMINOPHEN 325 MG/1
650 TABLET ORAL EVERY 6 HOURS PRN
Status: DISCONTINUED | OUTPATIENT
Start: 2024-07-14 | End: 2024-07-15 | Stop reason: HOSPADM

## 2024-07-14 RX ORDER — SODIUM CHLORIDE 9 MG/ML
INJECTION, SOLUTION INTRAVENOUS PRN
Status: DISCONTINUED | OUTPATIENT
Start: 2024-07-14 | End: 2024-07-15 | Stop reason: HOSPADM

## 2024-07-14 RX ADMIN — SODIUM CHLORIDE 1000 ML: 9 INJECTION, SOLUTION INTRAVENOUS at 21:08

## 2024-07-14 RX ADMIN — ASPIRIN 324 MG: 81 TABLET, CHEWABLE ORAL at 21:07

## 2024-07-14 ASSESSMENT — PAIN DESCRIPTION - PAIN TYPE: TYPE: ACUTE PAIN

## 2024-07-14 ASSESSMENT — PAIN - FUNCTIONAL ASSESSMENT: PAIN_FUNCTIONAL_ASSESSMENT: 0-10

## 2024-07-14 ASSESSMENT — PAIN DESCRIPTION - DESCRIPTORS: DESCRIPTORS: CRAMPING

## 2024-07-14 ASSESSMENT — PAIN SCALES - GENERAL: PAINLEVEL_OUTOF10: 7

## 2024-07-15 ENCOUNTER — APPOINTMENT (OUTPATIENT)
Dept: NUCLEAR MEDICINE | Age: 59
End: 2024-07-15
Payer: COMMERCIAL

## 2024-07-15 ENCOUNTER — APPOINTMENT (OUTPATIENT)
Age: 59
End: 2024-07-15
Payer: COMMERCIAL

## 2024-07-15 VITALS
HEART RATE: 56 BPM | SYSTOLIC BLOOD PRESSURE: 164 MMHG | DIASTOLIC BLOOD PRESSURE: 79 MMHG | RESPIRATION RATE: 13 BRPM | HEIGHT: 66 IN | WEIGHT: 200 LBS | BODY MASS INDEX: 32.14 KG/M2 | OXYGEN SATURATION: 96 % | TEMPERATURE: 97 F

## 2024-07-15 PROBLEM — I25.10 CORONARY ARTERY DISEASE: Status: ACTIVE | Noted: 2021-07-12

## 2024-07-15 LAB
ANION GAP SERPL CALCULATED.3IONS-SCNC: 9 MMOL/L (ref 9–16)
BASOPHILS # BLD: 0.06 K/UL (ref 0–0.2)
BASOPHILS NFR BLD: 1 % (ref 0–2)
BUN SERPL-MCNC: 17 MG/DL (ref 6–20)
CALCIUM SERPL-MCNC: 8.5 MG/DL (ref 8.6–10.4)
CHLORIDE SERPL-SCNC: 112 MMOL/L (ref 98–107)
CO2 SERPL-SCNC: 23 MMOL/L (ref 20–31)
CREAT SERPL-MCNC: 0.6 MG/DL (ref 0.5–0.9)
ECHO AO ASC DIAM: 3.4 CM
ECHO AO ASCENDING AORTA INDEX: 1.7 CM/M2
ECHO AO ROOT DIAM: 2.9 CM
ECHO AO ROOT INDEX: 1.45 CM/M2
ECHO AV AREA PEAK VELOCITY: 2.2 CM2
ECHO AV AREA VTI: 2.2 CM2
ECHO AV AREA/BSA PEAK VELOCITY: 1.1 CM2/M2
ECHO AV AREA/BSA VTI: 1.1 CM2/M2
ECHO AV MEAN GRADIENT: 3 MMHG
ECHO AV MEAN GRADIENT: 3 MMHG
ECHO AV MEAN VELOCITY: 0.8 M/S
ECHO AV PEAK GRADIENT: 5 MMHG
ECHO AV PEAK VELOCITY: 1.2 M/S
ECHO AV VELOCITY RATIO: 0.67
ECHO AV VTI: 29.2 CM
ECHO BSA: 2.06 M2
ECHO BSA: 2.06 M2
ECHO EST RA PRESSURE: 3 MMHG
ECHO LA AREA 2C: 16.7 CM2
ECHO LA AREA 4C: 19.5 CM2
ECHO LA DIAMETER INDEX: 1.85 CM/M2
ECHO LA DIAMETER: 3.7 CM
ECHO LA MAJOR AXIS: 6.1 CM
ECHO LA MINOR AXIS: 5.4 CM
ECHO LA TO AORTIC ROOT RATIO: 1.28
ECHO LA VOL BP: 49 ML (ref 22–52)
ECHO LA VOL MOD A2C: 42 ML (ref 22–52)
ECHO LA VOL MOD A4C: 52 ML (ref 22–52)
ECHO LA VOL/BSA BIPLANE: 25 ML/M2 (ref 16–34)
ECHO LA VOLUME INDEX MOD A2C: 21 ML/M2 (ref 16–34)
ECHO LA VOLUME INDEX MOD A4C: 26 ML/M2 (ref 16–34)
ECHO LV E' LATERAL VELOCITY: 9 CM/S
ECHO LV E' SEPTAL VELOCITY: 7 CM/S
ECHO LV EDV 3D: 140 ML
ECHO LV EDV INDEX 3D: 70 ML/M2
ECHO LV EJECTION FRACTION 3D: 58 %
ECHO LV ESV 3D: 58 ML
ECHO LV ESV INDEX 3D: 29 ML/M2
ECHO LV FRACTIONAL SHORTENING: 39 % (ref 28–44)
ECHO LV INTERNAL DIMENSION DIASTOLE INDEX: 2.2 CM/M2
ECHO LV INTERNAL DIMENSION DIASTOLIC: 4.4 CM (ref 3.9–5.3)
ECHO LV INTERNAL DIMENSION SYSTOLIC INDEX: 1.35 CM/M2
ECHO LV INTERNAL DIMENSION SYSTOLIC: 2.7 CM
ECHO LV IVSD: 1 CM (ref 0.6–0.9)
ECHO LV MASS 2D: 147.8 G (ref 67–162)
ECHO LV MASS 3D INDEX: 121 G/M2
ECHO LV MASS 3D: 242 G
ECHO LV MASS INDEX 2D: 73.9 G/M2 (ref 43–95)
ECHO LV POSTERIOR WALL DIASTOLIC: 1 CM (ref 0.6–0.9)
ECHO LV RELATIVE WALL THICKNESS RATIO: 0.45
ECHO LVOT AREA: 3.1 CM2
ECHO LVOT AV VTI INDEX: 0.69
ECHO LVOT DIAM: 2 CM
ECHO LVOT MEAN GRADIENT: 2 MMHG
ECHO LVOT PEAK GRADIENT: 3 MMHG
ECHO LVOT PEAK VELOCITY: 0.8 M/S
ECHO LVOT STROKE VOLUME INDEX: 31.7 ML/M2
ECHO LVOT SV: 63.4 ML
ECHO LVOT VTI: 20.2 CM
ECHO MV A VELOCITY: 0.78 M/S
ECHO MV AREA VTI: 1.9 CM2
ECHO MV E DECELERATION TIME (DT): 280 MS
ECHO MV E VELOCITY: 0.94 M/S
ECHO MV E/A RATIO: 1.21
ECHO MV E/E' LATERAL: 10.44
ECHO MV E/E' RATIO (AVERAGED): 11.94
ECHO MV E/E' SEPTAL: 13.43
ECHO MV LVOT VTI INDEX: 1.61
ECHO MV MAX VELOCITY: 1 M/S
ECHO MV MEAN GRADIENT: 1 MMHG
ECHO MV MEAN VELOCITY: 0.5 M/S
ECHO MV PEAK GRADIENT: 4 MMHG
ECHO MV VTI: 32.6 CM
ECHO PV MAX VELOCITY: 0.9 M/S
ECHO PV PEAK GRADIENT: 3 MMHG
ECHO RA AREA 4C: 20.2 CM2
ECHO RA END SYSTOLIC VOLUME APICAL 4 CHAMBER INDEX BSA: 27 ML/M2
ECHO RA VOLUME: 54 ML
ECHO RIGHT VENTRICULAR SYSTOLIC PRESSURE (RVSP): 26 MMHG
ECHO RV FREE WALL PEAK S': 15 CM/S
ECHO RV INTERNAL DIMENSION: 4.2 CM
ECHO RV TAPSE: 2.2 CM (ref 1.7–?)
ECHO TV REGURGITANT MAX VELOCITY: 2.41 M/S
ECHO TV REGURGITANT PEAK GRADIENT: 23 MMHG
EKG ATRIAL RATE: 59 BPM
EKG P AXIS: 33 DEGREES
EKG P-R INTERVAL: 170 MS
EKG Q-T INTERVAL: 434 MS
EKG QRS DURATION: 76 MS
EKG QTC CALCULATION (BAZETT): 429 MS
EKG R AXIS: 24 DEGREES
EKG T AXIS: 43 DEGREES
EKG VENTRICULAR RATE: 59 BPM
EOSINOPHIL # BLD: 0.26 K/UL (ref 0–0.44)
EOSINOPHILS RELATIVE PERCENT: 3 % (ref 1–4)
ERYTHROCYTE [DISTWIDTH] IN BLOOD BY AUTOMATED COUNT: 13.6 % (ref 11.8–14.4)
GFR, ESTIMATED: >90 ML/MIN/1.73M2
GLUCOSE SERPL-MCNC: 113 MG/DL (ref 74–99)
HCT VFR BLD AUTO: 38.8 % (ref 36.3–47.1)
HGB BLD-MCNC: 12 G/DL (ref 11.9–15.1)
IMM GRANULOCYTES # BLD AUTO: 0.03 K/UL (ref 0–0.3)
IMM GRANULOCYTES NFR BLD: 0 %
LYMPHOCYTES NFR BLD: 3.59 K/UL (ref 1.1–3.7)
LYMPHOCYTES RELATIVE PERCENT: 42 % (ref 24–43)
MCH RBC QN AUTO: 27.3 PG (ref 25.2–33.5)
MCHC RBC AUTO-ENTMCNC: 30.9 G/DL (ref 28.4–34.8)
MCV RBC AUTO: 88.2 FL (ref 82.6–102.9)
MONOCYTES NFR BLD: 0.79 K/UL (ref 0.1–1.2)
MONOCYTES NFR BLD: 9 % (ref 3–12)
NEUTROPHILS NFR BLD: 45 % (ref 36–65)
NEUTS SEG NFR BLD: 3.84 K/UL (ref 1.5–8.1)
NRBC BLD-RTO: 0 PER 100 WBC
NUC STRESS EJECTION FRACTION: 65 %
NUC STRESS LV EDV: 82 ML (ref 56–104)
PLATELET # BLD AUTO: 168 K/UL (ref 138–453)
PMV BLD AUTO: 11 FL (ref 8.1–13.5)
POTASSIUM SERPL-SCNC: 4 MMOL/L (ref 3.7–5.3)
RBC # BLD AUTO: 4.4 M/UL (ref 3.95–5.11)
SODIUM SERPL-SCNC: 144 MMOL/L (ref 136–145)
STRESS BASELINE DIAS BP: 102 MMHG
STRESS BASELINE HR: 53 BPM
STRESS BASELINE SYS BP: 148 MMHG
STRESS ESTIMATED WORKLOAD: 1 METS
STRESS PEAK DIAS BP: 88 MMHG
STRESS PEAK SYS BP: 151 MMHG
STRESS PERCENT HR ACHIEVED: 56 %
STRESS POST PEAK HR: 90 BPM
STRESS RATE PRESSURE PRODUCT: NORMAL BPM*MMHG
STRESS TARGET HR: 161 BPM
TID: 1.05
TROPONIN I SERPL HS-MCNC: <6 NG/L (ref 0–14)
WBC OTHER # BLD: 8.6 K/UL (ref 3.5–11.3)

## 2024-07-15 PROCEDURE — 2580000003 HC RX 258

## 2024-07-15 PROCEDURE — 78452 HT MUSCLE IMAGE SPECT MULT: CPT

## 2024-07-15 PROCEDURE — 96372 THER/PROPH/DIAG INJ SC/IM: CPT

## 2024-07-15 PROCEDURE — 93017 CV STRESS TEST TRACING ONLY: CPT

## 2024-07-15 PROCEDURE — 85025 COMPLETE CBC W/AUTO DIFF WBC: CPT

## 2024-07-15 PROCEDURE — A9500 TC99M SESTAMIBI: HCPCS | Performed by: STUDENT IN AN ORGANIZED HEALTH CARE EDUCATION/TRAINING PROGRAM

## 2024-07-15 PROCEDURE — 6370000000 HC RX 637 (ALT 250 FOR IP): Performed by: STUDENT IN AN ORGANIZED HEALTH CARE EDUCATION/TRAINING PROGRAM

## 2024-07-15 PROCEDURE — 93005 ELECTROCARDIOGRAM TRACING: CPT

## 2024-07-15 PROCEDURE — G0378 HOSPITAL OBSERVATION PER HR: HCPCS

## 2024-07-15 PROCEDURE — 84484 ASSAY OF TROPONIN QUANT: CPT

## 2024-07-15 PROCEDURE — 6360000002 HC RX W HCPCS

## 2024-07-15 PROCEDURE — 93306 TTE W/DOPPLER COMPLETE: CPT | Performed by: INTERNAL MEDICINE

## 2024-07-15 PROCEDURE — 99223 1ST HOSP IP/OBS HIGH 75: CPT | Performed by: INTERNAL MEDICINE

## 2024-07-15 PROCEDURE — 6360000002 HC RX W HCPCS: Performed by: STUDENT IN AN ORGANIZED HEALTH CARE EDUCATION/TRAINING PROGRAM

## 2024-07-15 PROCEDURE — 93018 CV STRESS TEST I&R ONLY: CPT | Performed by: INTERNAL MEDICINE

## 2024-07-15 PROCEDURE — 80048 BASIC METABOLIC PNL TOTAL CA: CPT

## 2024-07-15 PROCEDURE — 93306 TTE W/DOPPLER COMPLETE: CPT

## 2024-07-15 PROCEDURE — 6370000000 HC RX 637 (ALT 250 FOR IP)

## 2024-07-15 PROCEDURE — 2580000003 HC RX 258: Performed by: STUDENT IN AN ORGANIZED HEALTH CARE EDUCATION/TRAINING PROGRAM

## 2024-07-15 PROCEDURE — 3430000000 HC RX DIAGNOSTIC RADIOPHARMACEUTICAL: Performed by: STUDENT IN AN ORGANIZED HEALTH CARE EDUCATION/TRAINING PROGRAM

## 2024-07-15 RX ORDER — ALBUTEROL SULFATE 90 UG/1
2 AEROSOL, METERED RESPIRATORY (INHALATION) PRN
Status: DISCONTINUED | OUTPATIENT
Start: 2024-07-15 | End: 2024-07-15

## 2024-07-15 RX ORDER — REGADENOSON 0.08 MG/ML
0.4 INJECTION, SOLUTION INTRAVENOUS
Status: COMPLETED | OUTPATIENT
Start: 2024-07-15 | End: 2024-07-15

## 2024-07-15 RX ORDER — ATROPINE SULFATE 0.1 MG/ML
0.5 INJECTION INTRAVENOUS EVERY 5 MIN PRN
Status: DISCONTINUED | OUTPATIENT
Start: 2024-07-15 | End: 2024-07-15

## 2024-07-15 RX ORDER — ATORVASTATIN CALCIUM 40 MG/1
40 TABLET, FILM COATED ORAL NIGHTLY
Status: DISCONTINUED | OUTPATIENT
Start: 2024-07-15 | End: 2024-07-15 | Stop reason: HOSPADM

## 2024-07-15 RX ORDER — NITROGLYCERIN 0.4 MG/1
0.4 TABLET SUBLINGUAL EVERY 5 MIN PRN
Status: DISCONTINUED | OUTPATIENT
Start: 2024-07-15 | End: 2024-07-15

## 2024-07-15 RX ORDER — SODIUM CHLORIDE 0.9 % (FLUSH) 0.9 %
5-40 SYRINGE (ML) INJECTION PRN
Status: DISCONTINUED | OUTPATIENT
Start: 2024-07-15 | End: 2024-07-15

## 2024-07-15 RX ORDER — METFORMIN HYDROCHLORIDE 500 MG/1
500 TABLET, EXTENDED RELEASE ORAL
Status: DISCONTINUED | OUTPATIENT
Start: 2024-07-16 | End: 2024-07-15 | Stop reason: HOSPADM

## 2024-07-15 RX ORDER — METOPROLOL TARTRATE 1 MG/ML
5 INJECTION, SOLUTION INTRAVENOUS EVERY 5 MIN PRN
Status: DISCONTINUED | OUTPATIENT
Start: 2024-07-15 | End: 2024-07-15

## 2024-07-15 RX ORDER — ASPIRIN 81 MG/1
81 TABLET ORAL DAILY
Status: DISCONTINUED | OUTPATIENT
Start: 2024-07-15 | End: 2024-07-15 | Stop reason: HOSPADM

## 2024-07-15 RX ORDER — LOSARTAN POTASSIUM 50 MG/1
25 TABLET ORAL DAILY
Status: DISCONTINUED | OUTPATIENT
Start: 2024-07-15 | End: 2024-07-15 | Stop reason: HOSPADM

## 2024-07-15 RX ORDER — SODIUM CHLORIDE 9 MG/ML
500 INJECTION, SOLUTION INTRAVENOUS CONTINUOUS PRN
Status: DISCONTINUED | OUTPATIENT
Start: 2024-07-15 | End: 2024-07-15

## 2024-07-15 RX ORDER — TETRAKIS(2-METHOXYISOBUTYLISOCYANIDE)COPPER(I) TETRAFLUOROBORATE 1 MG/ML
40 INJECTION, POWDER, LYOPHILIZED, FOR SOLUTION INTRAVENOUS
Status: COMPLETED | OUTPATIENT
Start: 2024-07-15 | End: 2024-07-15

## 2024-07-15 RX ORDER — SODIUM CHLORIDE 0.9 % (FLUSH) 0.9 %
10 SYRINGE (ML) INJECTION PRN
Status: DISCONTINUED | OUTPATIENT
Start: 2024-07-15 | End: 2024-07-15 | Stop reason: HOSPADM

## 2024-07-15 RX ORDER — AMINOPHYLLINE 25 MG/ML
50 INJECTION, SOLUTION INTRAVENOUS PRN
Status: DISCONTINUED | OUTPATIENT
Start: 2024-07-15 | End: 2024-07-15

## 2024-07-15 RX ORDER — FAMOTIDINE 20 MG/1
20 TABLET, FILM COATED ORAL 2 TIMES DAILY
Status: DISCONTINUED | OUTPATIENT
Start: 2024-07-15 | End: 2024-07-15 | Stop reason: HOSPADM

## 2024-07-15 RX ORDER — CLOPIDOGREL BISULFATE 75 MG/1
75 TABLET ORAL DAILY
Status: DISCONTINUED | OUTPATIENT
Start: 2024-07-15 | End: 2024-07-15 | Stop reason: HOSPADM

## 2024-07-15 RX ORDER — ASPIRIN 81 MG/1
81 TABLET ORAL DAILY
Qty: 90 TABLET | Refills: 1 | Status: SHIPPED | OUTPATIENT
Start: 2024-07-15

## 2024-07-15 RX ORDER — RANOLAZINE 500 MG/1
1000 TABLET, EXTENDED RELEASE ORAL 2 TIMES DAILY
Status: DISCONTINUED | OUTPATIENT
Start: 2024-07-15 | End: 2024-07-15 | Stop reason: HOSPADM

## 2024-07-15 RX ORDER — TETRAKIS(2-METHOXYISOBUTYLISOCYANIDE)COPPER(I) TETRAFLUOROBORATE 1 MG/ML
13.6 INJECTION, POWDER, LYOPHILIZED, FOR SOLUTION INTRAVENOUS
Status: COMPLETED | OUTPATIENT
Start: 2024-07-15 | End: 2024-07-15

## 2024-07-15 RX ADMIN — SODIUM CHLORIDE, PRESERVATIVE FREE 10 ML: 5 INJECTION INTRAVENOUS at 11:42

## 2024-07-15 RX ADMIN — SODIUM CHLORIDE, PRESERVATIVE FREE 10 ML: 5 INJECTION INTRAVENOUS at 11:48

## 2024-07-15 RX ADMIN — ASPIRIN 81 MG: 81 TABLET, COATED ORAL at 10:42

## 2024-07-15 RX ADMIN — CLOPIDOGREL BISULFATE 75 MG: 75 TABLET ORAL at 10:42

## 2024-07-15 RX ADMIN — METOPROLOL TARTRATE 25 MG: 50 TABLET, FILM COATED ORAL at 10:42

## 2024-07-15 RX ADMIN — TETRAKIS(2-METHOXYISOBUTYLISOCYANIDE)COPPER(I) TETRAFLUOROBORATE 40 MILLICURIE: 1 INJECTION, POWDER, LYOPHILIZED, FOR SOLUTION INTRAVENOUS at 11:48

## 2024-07-15 RX ADMIN — SODIUM CHLORIDE, PRESERVATIVE FREE 10 ML: 5 INJECTION INTRAVENOUS at 09:43

## 2024-07-15 RX ADMIN — TETRAKIS(2-METHOXYISOBUTYLISOCYANIDE)COPPER(I) TETRAFLUOROBORATE 13.6 MILLICURIE: 1 INJECTION, POWDER, LYOPHILIZED, FOR SOLUTION INTRAVENOUS at 09:43

## 2024-07-15 RX ADMIN — ENOXAPARIN SODIUM 40 MG: 100 INJECTION SUBCUTANEOUS at 10:43

## 2024-07-15 RX ADMIN — RANOLAZINE 1000 MG: 500 TABLET, FILM COATED, EXTENDED RELEASE ORAL at 10:42

## 2024-07-15 RX ADMIN — LOSARTAN POTASSIUM 25 MG: 25 TABLET, FILM COATED ORAL at 10:42

## 2024-07-15 RX ADMIN — SODIUM CHLORIDE, PRESERVATIVE FREE 10 ML: 5 INJECTION INTRAVENOUS at 11:49

## 2024-07-15 RX ADMIN — ACETAMINOPHEN 650 MG: 325 TABLET ORAL at 10:47

## 2024-07-15 RX ADMIN — SODIUM CHLORIDE, PRESERVATIVE FREE 10 ML: 5 INJECTION INTRAVENOUS at 10:49

## 2024-07-15 RX ADMIN — REGADENOSON 0.4 MG: 0.08 INJECTION, SOLUTION INTRAVENOUS at 11:49

## 2024-07-15 ASSESSMENT — ENCOUNTER SYMPTOMS
ABDOMINAL PAIN: 0
VOMITING: 0
COUGH: 0
NAUSEA: 0
DIARRHEA: 0
SHORTNESS OF BREATH: 0
BACK PAIN: 0

## 2024-07-15 ASSESSMENT — HEART SCORE: ECG: NORMAL

## 2024-07-15 NOTE — H&P
[facility]  CDU / OBSERVATION ENCOUNTER  Physician NOTE     Pt Name: Amy Beaver  MRN: [id number]  Birthdate 1965  Date of evaluation: 7/15/24  Patient's PCP is :  Jacinda Dietz MD    CHIEF COMPLAINT       Chief Complaint   Patient presents with    Chest Pain     X2 days, worsening over the past two days, cramping that goes into L breast, better when laying down         HISTORY OF PRESENT ILLNESS    Amy Beaver is a 58 y.o. female who presents chest pain.  Patient reports that she has been having chest pain for about 3 days.  Reports that her pain is worse with exertion.  She denies any nausea, vomiting or diaphoresis with her chest pain.  Reports chest pain sometimes radiates into her back.  Reports that she has a history of CAD status post 1 stent.  She was unable to finish her shift at work due to pain.  She denies any fevers, chills, abdominal pain, change in bowel habits, changes in urinary habits.  Describes her chest pain as burning, chest pain is intermittent in nature.      History was obtained in part through review of the ED chart. When possible, a direct discussion was had with ED nurses, residents, and attendings  REVIEW OF SYSTEMS       General ROS - No fevers, No malaise   Ophthalmic ROS - No discharge, No changes in vision  ENT ROS -  No sore throat, No rhinorrhea,   Respiratory ROS - no shortness of breath, no cough, no  wheezing  Gastrointestinal ROS - No abdominal pain, no nausea or vomiting, no change in bowel habits, no black or bloody stools  Genito-Urinary ROS - No dysuria, trouble voiding, or hematuria  Musculoskeletal ROS - No myalgias, No arthalgias  Neurological ROS - No headache, no dizziness/lightheadedness, No focal weakness, no loss of sensation  Dermatological ROS - No lesions, No rash     (PQRS) Advance directives on face sheet per hospital policy. No change unless specifically mentioned in chart    PAST MEDICAL HISTORY    has a past medical history of    NEUROLOGIC:  MAEx4, no focal sensory or motor deficits   MUSCULOSKELETAL: no clubbing, cyanosis or edema   SKIN: no rash or wounds       DIFFERENTIAL DIAGNOSIS/MDM:     FROM ED MEDICAL DECISION MAKING NOTE:   Problem List / Differential Diagnosis: Differential diagnosis includes but is not limited to...     # Chest pain  -ACS, electrolyte abnormality, arrhythmia, anemia, pneumothorax, pneumonia, COPD exacerbation, stable/unstable angina, dissection, esophagitis, esophageal dysmotility, GERD     Clinical Gestalt: Patient hemodynamically stable, relatively well-appearing complaining of chest pain which has been worsening over the past couple days consistent with previous time that she required 1 stent.     Plan: Cardiac evaluation, normal saline, aspirin, serial reassessment     Reassessment / Response to Plan / Findings: In brief, troponin stable, labs relatively unremarkable, EKG as noted, patient with heart score 4.  Recommend patient be admitted to the observation department for reevaluation via the cardiology team with additional recommendations to follow.      *Additional specifics as per ED course.     DISPO: Patient to be admitted to the observation department with cardiology consultation with recommendation for reevaluation by the cardiology team for consideration for any additional testing based on patient's symptoms.    DIAGNOSTIC RESULTS     EKG: All EKG's are interpreted by the Observation Physician who either signs or Co-signs this chart in the absence of a cardiologist.    EKG Interpretation    Interpreted by observation physician    Rhythm: normal sinus   Rate: normal  Axis: normal  Ectopy: none  Conduction: normal  ST Segments: no acute change  T Waves: no acute change  Q Waves: none    Clinical Impression: no acute changes    Deyanira Maya MD        RADIOLOGY:   I directly visualized the following  images and reviewed the radiologist interpretations:    XR CHEST (2 VW)    Result Date:

## 2024-07-15 NOTE — CONSULTS
Attestation signed by      Attending Physician Statement:    I have discussed the care of  Amy Beaver , including pertinent history and exam findings, with the Cardiology fellow/resident.     I have seen and examined the patient and the key elements of all parts of the encounter have been performed by me. I agree with the assessment, plan and orders as documented by the fellow/resident, after I modified exam findings and plan of treatments, and the final version is my approved version of the assessment.     Additional Comments: Atypical chest pain. H/o CAD. Increase ranexa to 1000mg po BID. ASA/statin. Continue BB. Lexiscan stress test and echo. Counseled to quit smoking.    Sav Gant MD               [facility]   Admission Note                 Patient's name:  Amy Beaver  Medical Record Number: [id number]  Patient's account/billing number: [id number]  Patient's YOB: 1965  Age: 58 y.o.  Date of Admission: 7/14/2024  8:30 PM  Date of History and Physical Examination: 7/15/2024  Primary Care Physician: Jacinda Dietz MD    Code Status: Full Code    CHIEF COMPLAINT: Chest pain    CONSULT REASON: Heart score 4    HISTORY OF PRESENT ILLNESS:      History was obtained from chart review and the patient.      Amy Beaver is a 58 y.o. female presenting to ED via walk-in with complain of chest pain, that started 3-4 days back but gor worsened yestersday, it  is like burning sensation , comes and goes all day. Not provoked with exertion, not radiating located  to the left side of upper chest, aggrevated by deep breath and couphing. Not associated with papitation, SOB, sweating, nausea, vomiting and dizziness.   We were consulted for evealuation of chest poin as she has notable history of CAD with stent placed in 2021.    Past Medical History:     has a past medical history of Arthritis, Chronic midline low back pain without sciatica, Coronary artery disease of native artery of

## 2024-07-15 NOTE — DISCHARGE SUMMARY
Activity:  As tolerated    Consultants: IP CONSULT TO CARDIOLOGY    Procedures:  Not indicated     Diagnostic Test:   Results for orders placed or performed during the hospital encounter of 07/14/24   Basic Metabolic Panel   Result Value Ref Range    Sodium 141 136 - 145 mmol/L    Potassium 3.7 3.7 - 5.3 mmol/L    Chloride 106 98 - 107 mmol/L    CO2 23 20 - 31 mmol/L    Anion Gap 12 9 - 16 mmol/L    Glucose 125 (H) 74 - 99 mg/dL    BUN 20 6 - 20 mg/dL    Creatinine 0.6 0.50 - 0.90 mg/dL    Est, Glom Filt Rate >90 >60 mL/min/1.73m2    Calcium 9.8 8.6 - 10.4 mg/dL   CBC with Auto Differential   Result Value Ref Range    WBC 9.3 3.5 - 11.3 k/uL    RBC 4.70 3.95 - 5.11 m/uL    Hemoglobin 13.2 11.9 - 15.1 g/dL    Hematocrit 40.4 36.3 - 47.1 %    MCV 86.0 82.6 - 102.9 fL    MCH 28.1 25.2 - 33.5 pg    MCHC 32.7 28.4 - 34.8 g/dL    RDW 13.6 11.8 - 14.4 %    Platelets 193 138 - 453 k/uL    MPV 11.5 8.1 - 13.5 fL    NRBC Automated 0.0 0.0 per 100 WBC    Neutrophils % 48 36 - 65 %    Lymphocytes % 42 24 - 43 %    Monocytes % 7 3 - 12 %    Eosinophils % 2 1 - 4 %    Basophils % 1 0 - 2 %    Immature Granulocytes % 0 0 %    Neutrophils Absolute 4.45 1.50 - 8.10 k/uL    Lymphocytes Absolute 3.96 (H) 1.10 - 3.70 k/uL    Monocytes Absolute 0.61 0.10 - 1.20 k/uL    Eosinophils Absolute 0.22 0.00 - 0.44 k/uL    Basophils Absolute 0.07 0.00 - 0.20 k/uL    Immature Granulocytes Absolute <0.03 0.00 - 0.30 k/uL   Troponin   Result Value Ref Range    Troponin, High Sensitivity <6 0 - 14 ng/L   Troponin   Result Value Ref Range    Troponin, High Sensitivity <6 0 - 14 ng/L   Basic Metabolic Panel w/ Reflex to MG   Result Value Ref Range    Sodium 144 136 - 145 mmol/L    Potassium 4.0 3.7 - 5.3 mmol/L    Chloride 112 (H) 98 - 107 mmol/L    CO2 23 20 - 31 mmol/L    Anion Gap 9 9 - 16 mmol/L    Glucose 113 (H) 74 - 99 mg/dL    BUN 17 6 - 20 mg/dL    Creatinine 0.6 0.50 - 0.90 mg/dL    Est, Glom Filt Rate >90 >60 mL/min/1.73m2    Calcium  narcotics earlier that day and that they will arrange for transportation on their own or work with the  for a ride. Patient counseled NOT to drive while under the influence of narcotics/ pain killers.     Condition: Good    Patient stable and ready for discharge home. I have discussed plan of care with patient and they are in understanding. They were instructed to read discharge paperwork. All of their questions and concerns were addressed.     Time Spent: 0 day      --  Deyanira Maya MD  Emergency Medicine Resident Physician    This dictation was generated by voice recognition computer software.  Although all attempts are made to edit the dictation for accuracy, there may be errors in the transcription that are not intended.

## 2024-07-15 NOTE — ED NOTES
ED to inpatient nurses report      Chief Complaint:  Chief Complaint   Patient presents with    Chest Pain     X2 days, worsening over the past two days, cramping that goes into L breast, better when laying down     Present to ED from: work    MOA:     LOC: alert and orientated to name, place, date  Mobility: Independent  Oxygen Baseline: RA    Current needs required: NA   Pending ED orders: NA  Present condition: stable    Why did the patient come to the ED? CP x3 days, not improving  What is the plan? Admission, observation  Any procedures or intervention occur? Labs, imaging, fluid bolus, stress test  Any safety concerns?? NA    Mental Status:  Level of Consciousness: Alert (0)    Psych Assessment: WNL     Vital signs   Vitals:    07/15/24 0856 07/15/24 0927 07/15/24 1029 07/15/24 1042   BP: (!) 157/75   (!) 143/74   Pulse: 65 58 59    Resp:  14 13    Temp:       SpO2: 96% 94% 94%    Weight:       Height:            Vitals:  Patient Vitals for the past 24 hrs:   BP Temp Pulse Resp SpO2 Height Weight   07/15/24 1042 (!) 143/74 -- -- -- -- -- --   07/15/24 1029 -- -- 59 13 94 % -- --   07/15/24 0927 -- -- 58 14 94 % -- --   07/15/24 0856 (!) 157/75 -- 65 -- 96 % -- --   07/15/24 0845 -- -- 66 17 95 % -- --   07/15/24 0755 (!) 156/88 -- 59 11 93 % -- --   07/15/24 0623 -- -- 69 16 96 % -- --   07/15/24 0619 -- -- 62 13 92 % -- --   07/15/24 0558 (!) 149/80 -- 60 20 95 % -- --   07/15/24 0357 139/80 -- 62 14 93 % -- --   07/15/24 0201 (!) 153/76 -- -- -- -- -- --   07/14/24 2214 -- -- 64 13 95 % -- --   07/14/24 2148 (!) 149/79 -- 62 12 93 % -- --   07/14/24 1930 (!) 179/84 98.1 °F (36.7 °C) 79 16 97 % 1.676 m (5' 6\") 90.7 kg (200 lb)      Visit Vitals  BP (!) 143/74   Pulse 59   Temp 98.1 °F (36.7 °C)   Resp 13   Ht 1.676 m (5' 6\")   Wt 90.7 kg (200 lb)   SpO2 94%   BMI 32.28 kg/m²        LDAs:   Peripheral IV 07/14/24 Right Forearm (Active)   Site Assessment Clean, dry & intact 07/14/24 2102   Line Status Blood  esophagitis 5/19/2017    H/O vaginal hysterectomy 2006    Mild chronic obstructive pulmonary disease (HCC) 10/22/2021    Mixed hyperlipidemia 7/12/2021    Tobacco abuse     Type 2 diabetes mellitus with hyperglycemia, without long-term current use of insulin ([facility]) 7/19/2021    Vision abnormalities     glasses for reading       Labs:  Labs Reviewed   BASIC METABOLIC PANEL - Abnormal; Notable for the following components:       Result Value    Glucose 125 (*)     All other components within normal limits   CBC WITH AUTO DIFFERENTIAL - Abnormal; Notable for the following components:    Lymphocytes Absolute 3.96 (*)     All other components within normal limits   BASIC METABOLIC PANEL W/ REFLEX TO MG FOR LOW K - Abnormal; Notable for the following components:    Chloride 112 (*)     Glucose 113 (*)     Calcium 8.5 (*)     All other components within normal limits   TROPONIN   TROPONIN   CBC WITH AUTO DIFFERENTIAL   TROPONIN       Electronically signed by Marilin Aguilera RN on 7/15/2024 at 1:08 PM

## 2024-07-15 NOTE — ED PROVIDER NOTES
[facility] ED  Emergency Department Encounter  Emergency Medicine Resident     Pt Name:Amy Beaver  MRN: [id number]  Birthdate 1965  Date of evaluation: 24  PCP:  Jacinda Dietz MD  Note Started: 8:55 PM EDT      CHIEF COMPLAINT       Chief Complaint   Patient presents with    Chest Pain     X2 days, worsening over the past two days, cramping that goes into L breast, better when laying down       HISTORY OF PRESENT ILLNESS  (Location/Symptom, Timing/Onset, Context/Setting, Quality, Duration, Modifying Factors, Severity.)      Amy Beaver is a 58 y.o. female presenting to ED via walk-in for    CC's: Chest pain    Patient endorses 2 days of left-sided chest pain/cramping.  Patient endorses pain that is worse with exertion.  Patient denies any nausea or diaphoresis associated with this.  Patient denies any radiation of symptoms.  Patient endorses that she has got a past medical history of coronary artery disease with 1 stent and endorses that the symptoms that she is feeling are similar to previous.  Patient endorses that she was unable to finish her shift today because of significant pain and discomfort and ultimately decided to come to the ED for further evaluation and treatment.    Notable PMHx: Coronary artery disease with 1 stent 3 years ago, diabetes, tobacco use/abuse    PAST MEDICAL / SURGICAL / SOCIAL / FAMILY HISTORY      has a past medical history of Arthritis, Chronic midline low back pain without sciatica, Coronary artery disease of native artery of native heart with stable angina pectoris (HCC), Essential hypertension, Gastroesophageal reflux disease without esophagitis, H/O vaginal hysterectomy, Mild chronic obstructive pulmonary disease (HCC), Mixed hyperlipidemia, Tobacco abuse, Type 2 diabetes mellitus with hyperglycemia, without long-term current use of insulin (HCC), and Vision abnormalities.       has a past surgical history that includes Tonsillectomy;   QTc: Normal   Axis: Normal  Ectopy: None  Conduction: Normal  Ischemia:   -- ST Segments: No Acute Change  -- T Waves: No Acute Change  -- Q Waves: None  OTHER: N/A    Clinical Impression: Normal sinus rhythm without acute ST or T wave changes      Lemuel Hartley DO  Emergency Medicine Resident   [place], [place]  7/14/24 8:56 PM EDT    All EKG's are interpreted by the Emergency Department Physician who either signs or Co-signs this chart in the absence of a cardiologist.    EMERGENCY DEPARTMENT COURSE:      ED Course as of 07/15/24 0235   Sun Jul 14, 2024 2038 BP(!): 179/84 [LUCIO]   2056   EKG Interpretation    Date and Time ECG Performed: 7/14/2024 @ 1928    Rate: Normal (60 to 100 BPM)  Rhythm: Normal Sinus   Intervals:   -- CA: Normal   -- QRS: Normal  -- QTc: Normal   Axis: Normal  Ectopy: None  Conduction: Normal  Ischemia:   -- ST Segments: No Acute Change  -- T Waves: No Acute Change  -- Q Waves: None  OTHER: N/A    Clinical Impression: Normal sinus rhythm without acute ST or T wave changes      Lemuel Hartley DO  Emergency Medicine Resident   [place]  7/14/24 8:56 PM EDT   [LUCIO]   2137 CBC with Auto Differential(!):    WBC 9.3   RBC 4.70   Hemoglobin Quant 13.2   Hematocrit 40.4   MCV 86.0   MCH 28.1   MCHC 32.7   RDW 13.6   Platelet Count 193   MPV 11.5   NRBC Automated 0.0   Neutrophils % 48   Lymphocyte % 42   Monocytes % 7   Eosinophils % 2   Basophils % 1   Immature Granulocytes % 0   Neutrophils Absolute 4.45   Lymphocytes Absolute 3.96(!)   Monocytes Absolute 0.61   Eosinophils Absolute 0.22   Basophils Absolute 0.07   Immature Granulocytes Absolute <0.03  Within normal limits [LUCIO]   2138 Basic Metabolic Panel(!):    Sodium 141   Potassium 3.7   Chloride 106   CARBON DIOXIDE 23   Anion Gap 12   Glucose 125(!)   BUN,BUNPL 20   Creatinine 0.6   Est, Glom Filt Rate >90   Calcium 9.8  Within normal limits [LUCIO]   2241 Troponin, High

## 2024-07-15 NOTE — ED TRIAGE NOTES
Pt ambulatory to ED 33 from triage with spouse.  Pt is a/o x4.  Pt c/o chest pressure for the past 3 days.  Pt stated there is pressure and then cramping that goes down her L breast.  Pt stated the pressure is better when laying down.  Pt placed on full cardiac monitor.  Vitals assessed and noted.  Call light and warm blankets provided.  NAD noted.  Care ongoing.

## 2024-07-15 NOTE — PROGRESS NOTES
CLINICAL PHARMACY NOTE: MEDS TO BEDS    Total # of Prescriptions Filled: 1   The following medications were delivered to the patient:  Aspirin    Additional Documentation:  $1.57 collected - cash

## 2024-07-15 NOTE — ED PROVIDER NOTES
[facility]     Emergency Department     Faculty Attestation    I performed a history and physical examination of the patient and discussed management with the resident. I reviewed the resident’s note and agree with the documented findings and plan of care. Any areas of disagreement are noted on the chart. I was personally present for the key portions of any procedures. I have documented in the chart those procedures where I was not present during the key portions. I have reviewed the emergency nurses triage note. I agree with the chief complaint, past medical history, past surgical history, allergies, medications, social and family history as documented unless otherwise noted below. Documentation of the HPI, Physical Exam and Medical Decision Making performed by medical students or scribes is based on my personal performance of the HPI, PE and MDM. For Physician Assistant/ Nurse Practitioner cases/documentation I have personally evaluated this patient and have completed at least one if not all key elements of the E/M (history, physical exam, and MDM). Additional findings are as noted.    Vital signs:   Vitals:    07/14/24 1930   BP: (!) 179/84   Pulse: 79   Resp: 16   Temp: 98.1 °F (36.7 °C)   SpO2: 97%      58-year-old female with known CAD  and 1 stent here with left sided chest pain that radiates to her left breast. She also reports shortness of breath and dyspnea on exertion. Her chest pain is worse with exertion. No nausea, vomiting or diaphoresis. No URI symptoms. The patient has no prior history of venous thromboembolism. No recent travel. No recent surgery. No leg swelling. No hemoptysis. No estrogen containing medications. No history of malignancy. She is complaint with her plavix.  She has not been taking her aspirin because she ran out.  On physical exam, the patient is alert and afebrile.  Breath sounds clear and equal bilaterally.  Cardiac exam with a normal rate, regular

## 2024-07-15 NOTE — DISCHARGE INSTRUCTIONS
Call today or tomorrow to follow up with your PCP in the next 2 or 3 days for postdischarge reassessment.  Please follow-up with a cardiologist that saw you during your stay in the next 3 or 4 weeks.    Please take all medications as prescribed.  You can also use ibuprofen or Tylenol (unless prescribed medications that have Tylenol in it) for pain.  You can take over the counter Ibuprofen (advil) tablets (4 every 8 hours or 3 every 6 hours or 2 every 4 hours)      Please return to the Emergency Department if you develop any symptoms that concern you, including the following: increasing pain, shortness of breath, swelling to your feet, unable to lay flat, vomiting, fevers, numbness, weakness, loss of bladder/bowel control, loss of consciousness or any other concerns.

## 2024-07-16 ENCOUNTER — TELEPHONE (OUTPATIENT)
Dept: FAMILY MEDICINE CLINIC | Age: 59
End: 2024-07-16

## 2024-07-16 LAB
EKG ATRIAL RATE: 77 BPM
EKG P AXIS: 31 DEGREES
EKG P-R INTERVAL: 164 MS
EKG Q-T INTERVAL: 396 MS
EKG QRS DURATION: 80 MS
EKG QTC CALCULATION (BAZETT): 448 MS
EKG R AXIS: 16 DEGREES
EKG T AXIS: 32 DEGREES
EKG VENTRICULAR RATE: 77 BPM

## 2024-07-16 NOTE — TELEPHONE ENCOUNTER
[facility] Care Transitions Initial Follow Up Call    Outreach made within 2 business days of discharge: Yes    Patient: Amy Beaver Patient : 1965   MRN: [id number]  Reason for Admission: There are no discharge diagnoses documented for the most recent discharge.  Discharge Date: 7/15/24       Spoke with: ADILSONMARIANNE    If Virtual visit made for hospital follow up, advise patient to make sure to have family member present to help assist with visit. Please make sure mobile number is updated in patient chart.     Discharge department/facility:      2024 - 7/15/2024 (20 hours)  [facility]     Blue Landry MD  Last attending  Treatment team Chest pain  Principal problem       TCM Interactive Patient Contact:  Was patient able to fill all prescriptions: Yes  Was patient instructed to bring all medications to the follow-up visit: Yes  Is patient taking all medications as directed in the discharge summary? Yes  Does patient understand their discharge instructions: Yes  Does patient have questions or concerns that need addressed prior to 7-14 day follow up office visit: no    Scheduled appointment with PCP within 7-14 days    Follow Up  Future Appointments   Date Time Provider Department Center   2024 11:45 AM Jacinda Dietz MD [id number]       Charline Osorio

## 2024-07-19 ENCOUNTER — OFFICE VISIT (OUTPATIENT)
Dept: FAMILY MEDICINE CLINIC | Age: 59
End: 2024-07-19
Payer: COMMERCIAL

## 2024-07-19 VITALS
DIASTOLIC BLOOD PRESSURE: 80 MMHG | SYSTOLIC BLOOD PRESSURE: 126 MMHG | TEMPERATURE: 97.2 F | HEART RATE: 55 BPM | WEIGHT: 199.6 LBS | HEIGHT: 66 IN | OXYGEN SATURATION: 94 % | BODY MASS INDEX: 32.08 KG/M2

## 2024-07-19 DIAGNOSIS — E11.42 TYPE 2 DIABETES MELLITUS WITH DIABETIC POLYNEUROPATHY, WITHOUT LONG-TERM CURRENT USE OF INSULIN (HCC): Primary | ICD-10-CM

## 2024-07-19 DIAGNOSIS — J44.9 MILD CHRONIC OBSTRUCTIVE PULMONARY DISEASE (HCC): ICD-10-CM

## 2024-07-19 DIAGNOSIS — I10 PRIMARY HYPERTENSION: ICD-10-CM

## 2024-07-19 DIAGNOSIS — F17.200 CURRENT SMOKER: ICD-10-CM

## 2024-07-19 DIAGNOSIS — E78.2 MIXED HYPERLIPIDEMIA: ICD-10-CM

## 2024-07-19 DIAGNOSIS — I20.2 REFRACTORY ANGINA PECTORIS (HCC): ICD-10-CM

## 2024-07-19 DIAGNOSIS — I25.83 CORONARY ARTERY DISEASE DUE TO LIPID RICH PLAQUE: ICD-10-CM

## 2024-07-19 DIAGNOSIS — S62.001D CLOSED NONDISPLACED FRACTURE OF SCAPHOID OF RIGHT WRIST WITH ROUTINE HEALING, UNSPECIFIED PORTION OF SCAPHOID, SUBSEQUENT ENCOUNTER: ICD-10-CM

## 2024-07-19 DIAGNOSIS — I25.10 CORONARY ARTERY DISEASE DUE TO LIPID RICH PLAQUE: ICD-10-CM

## 2024-07-19 DIAGNOSIS — K21.9 GASTROESOPHAGEAL REFLUX DISEASE WITHOUT ESOPHAGITIS: ICD-10-CM

## 2024-07-19 LAB — HBA1C MFR BLD: 6.2 %

## 2024-07-19 PROCEDURE — 3074F SYST BP LT 130 MM HG: CPT | Performed by: FAMILY MEDICINE

## 2024-07-19 PROCEDURE — 2022F DILAT RTA XM EVC RTNOPTHY: CPT | Performed by: FAMILY MEDICINE

## 2024-07-19 PROCEDURE — G8427 DOCREV CUR MEDS BY ELIG CLIN: HCPCS | Performed by: FAMILY MEDICINE

## 2024-07-19 PROCEDURE — 4004F PT TOBACCO SCREEN RCVD TLK: CPT | Performed by: FAMILY MEDICINE

## 2024-07-19 PROCEDURE — 83036 HEMOGLOBIN GLYCOSYLATED A1C: CPT | Performed by: FAMILY MEDICINE

## 2024-07-19 PROCEDURE — 99214 OFFICE O/P EST MOD 30 MIN: CPT | Performed by: FAMILY MEDICINE

## 2024-07-19 PROCEDURE — 3079F DIAST BP 80-89 MM HG: CPT | Performed by: FAMILY MEDICINE

## 2024-07-19 PROCEDURE — 3017F COLORECTAL CA SCREEN DOC REV: CPT | Performed by: FAMILY MEDICINE

## 2024-07-19 PROCEDURE — G8417 CALC BMI ABV UP PARAM F/U: HCPCS | Performed by: FAMILY MEDICINE

## 2024-07-19 PROCEDURE — 3023F SPIROM DOC REV: CPT | Performed by: FAMILY MEDICINE

## 2024-07-19 PROCEDURE — 3044F HG A1C LEVEL LT 7.0%: CPT | Performed by: FAMILY MEDICINE

## 2024-07-19 RX ORDER — RANOLAZINE 500 MG/1
500 TABLET, EXTENDED RELEASE ORAL 2 TIMES DAILY
Qty: 60 TABLET | Refills: 3 | Status: SHIPPED | OUTPATIENT
Start: 2024-07-19

## 2024-07-19 RX ORDER — FAMOTIDINE 20 MG/1
20 TABLET, FILM COATED ORAL 2 TIMES DAILY
Qty: 60 TABLET | Refills: 2 | Status: SHIPPED | OUTPATIENT
Start: 2024-07-19

## 2024-07-19 SDOH — ECONOMIC STABILITY: FOOD INSECURITY: WITHIN THE PAST 12 MONTHS, YOU WORRIED THAT YOUR FOOD WOULD RUN OUT BEFORE YOU GOT MONEY TO BUY MORE.: NEVER TRUE

## 2024-07-19 SDOH — ECONOMIC STABILITY: FOOD INSECURITY: WITHIN THE PAST 12 MONTHS, THE FOOD YOU BOUGHT JUST DIDN'T LAST AND YOU DIDN'T HAVE MONEY TO GET MORE.: NEVER TRUE

## 2024-07-19 SDOH — ECONOMIC STABILITY: INCOME INSECURITY: HOW HARD IS IT FOR YOU TO PAY FOR THE VERY BASICS LIKE FOOD, HOUSING, MEDICAL CARE, AND HEATING?: NOT HARD AT ALL

## 2024-07-19 ASSESSMENT — PATIENT HEALTH QUESTIONNAIRE - PHQ9
SUM OF ALL RESPONSES TO PHQ QUESTIONS 1-9: 0
SUM OF ALL RESPONSES TO PHQ9 QUESTIONS 1 & 2: 0
1. LITTLE INTEREST OR PLEASURE IN DOING THINGS: NOT AT ALL
SUM OF ALL RESPONSES TO PHQ QUESTIONS 1-9: 0
2. FEELING DOWN, DEPRESSED OR HOPELESS: NOT AT ALL
SUM OF ALL RESPONSES TO PHQ QUESTIONS 1-9: 0
SUM OF ALL RESPONSES TO PHQ QUESTIONS 1-9: 0

## 2024-07-19 NOTE — PROGRESS NOTES
[facility]  CDU / OBSERVATION ENCOUNTER  ATTENDING NOTE         I performed a history and physical examination of the patient and discussed management with the resident or midlevel provider. I reviewed the resident or midlevel provider's note and agree with the documented findings and plan of care. Any areas of disagreement are noted on the chart. I was personally present for the key portions of any procedures. I have documented in the chart those procedures where I was not present during the key portions. I have reviewed the nurses notes. I agree with the chief complaint, past medical history, past surgical history, allergies, medications, social and family history as documented unless otherwise noted below.    The Family history, social history, and ROS are effectively unchanged since admission unless noted elsewhere in the chart.     This patient was placed in the observation unit for reevaluation for possible admission to the hospital     Patient admitted for cardiac evaluation.  Patient had further testing done with cardiac echo and cardiac stress testing.  Disposition based on testing results.       Blue Landry MD  Attending Emergency  Physician

## 2024-07-19 NOTE — PROGRESS NOTES
Visit Information    Have you changed or started any medications since your last visit including any over-the-counter medicines, vitamins, or herbal medicines? no   Have you stopped taking any of your medications? Is so, why? -  no  Are you having any side effects from any of your medications? - no    Have you seen any other physician or provider since your last visit?  yes -    Have you had any other diagnostic tests since your last visit?  yes -    Have you been seen in the emergency room and/or had an admission in a hospital since we last saw you?  yes -    Have you had your routine dental cleaning in the past 6 months?  no     Do you have an active MyChart account? If no, what is the barrier?  Yes    Patient Care Team:  Jacinda Dietz MD as PCP - General (Family Medicine)  Jacinda Dietz MD as PCP - Empaneled Provider  Magen Montez DO as Consulting Physician (Obstetrics & Gynecology)    Medical History Review  Past Medical, Family, and Social History reviewed and does contribute to the patient presenting condition    Health Maintenance   Topic Date Due    Hepatitis B vaccine (1 of 3 - 3-dose series) Never done    Diabetic retinal exam  Never done    DTaP/Tdap/Td vaccine (1 - Tdap) Never done    Shingles vaccine (1 of 2) Never done    Pneumococcal 0-64 years Vaccine (2 of 2 - PCV) 10/22/2022    COVID-19 Vaccine (3 - 2023-24 season) 09/01/2023    A1C test (Diabetic or Prediabetic)  07/18/2024    Flu vaccine (1) 08/01/2024    Diabetic Alb to Cr ratio (uACR) test  12/13/2024    Lipids  12/13/2024    Diabetic foot exam  04/18/2025    Depression Screen  04/18/2025    Lung Cancer Screening &/or Counseling  05/14/2025    GFR test (Diabetes, CKD 3-4, OR last GFR 15-59)  07/15/2025    Breast cancer screen  07/31/2025    Colorectal Cancer Screen  08/27/2025    Hepatitis C screen  Completed    HIV screen  Completed    Hepatitis A vaccine  Aged Out    Hib vaccine  Aged Out    Polio vaccine  Aged Out    
  07/19/24 90.5 kg (199 lb 9.6 oz)   07/14/24 90.7 kg (200 lb)   06/04/24 90.7 kg (200 lb)        [x]Negative depression screening.      7/19/2024    11:35 AM 4/18/2024    10:05 AM 12/18/2023     1:25 PM 9/18/2023    11:10 AM 4/25/2023     9:23 AM 6/27/2022    11:13 AM 2/25/2022    10:59 AM   PHQ Scores   PHQ2 Score 0 0 0 0 0 0 0   PHQ9 Score 0 0 0 0 0 0 0      []1-4 = Minimal depression   []5-9 = Milddepression   []10-14 = Moderate depression   []15-19 = Moderately severe depression   []20-27 = Severe depression    Discussed testing with the patient and all questions fully answered.    Admission on 07/14/2024, Discharged on 07/15/2024   Component Date Value Ref Range Status    Sodium 07/14/2024 141  136 - 145 mmol/L Final    Potassium 07/14/2024 3.7  3.7 - 5.3 mmol/L Final    SPECIMEN SLIGHTLY HEMOLYZED, RESULTS MAY BE ADVERSELY AFFECTED.    Chloride 07/14/2024 106  98 - 107 mmol/L Final    CO2 07/14/2024 23  20 - 31 mmol/L Final    Anion Gap 07/14/2024 12  9 - 16 mmol/L Final    Glucose 07/14/2024 125 (H)  74 - 99 mg/dL Final    BUN 07/14/2024 20  6 - 20 mg/dL Final    Creatinine 07/14/2024 0.6  0.50 - 0.90 mg/dL Final    Est, Glom Filt Rate 07/14/2024 >90  >60 mL/min/1.73m2 Final    Comment:       These results are not intended for use in patients <18 years of age.        eGFR results are calculated without a race factor using the 2021 CKD-EPI equation.  Careful clinical correlation is recommended, particularly when comparing to results   calculated using previous equations.  The CKD-EPI equation is less accurate in patients with extremes of muscle mass, extra-renal   metabolism of creatine, excessive creatine ingestion, or following therapy that affects   renal tubular secretion.      Calcium 07/14/2024 9.8  8.6 - 10.4 mg/dL Final    WBC 07/14/2024 9.3  3.5 - 11.3 k/uL Final    RBC 07/14/2024 4.70  3.95 - 5.11 m/uL Final    Hemoglobin 07/14/2024 13.2  11.9 - 15.1 g/dL Final    Hematocrit 07/14/2024 40.4  36.3 -

## 2024-07-20 ASSESSMENT — ENCOUNTER SYMPTOMS
WHEEZING: 0
BLOOD IN STOOL: 0
RHINORRHEA: 0
SHORTNESS OF BREATH: 0
COLOR CHANGE: 0
STRIDOR: 0
SINUS PRESSURE: 0
BACK PAIN: 1
ABDOMINAL PAIN: 0
CONSTIPATION: 0
CHEST TIGHTNESS: 0
NAUSEA: 0
ABDOMINAL DISTENTION: 0
EYE REDNESS: 0
SORE THROAT: 0
TROUBLE SWALLOWING: 0
VOMITING: 0
RECTAL PAIN: 0
DIARRHEA: 0
COUGH: 0

## 2024-08-02 ENCOUNTER — HOSPITAL ENCOUNTER (OUTPATIENT)
Dept: WOMENS IMAGING | Age: 59
Discharge: HOME | End: 2024-08-02
Payer: COMMERCIAL

## 2024-08-02 DIAGNOSIS — Z12.31 SCREENING MAMMOGRAM FOR HIGH-RISK PATIENT: ICD-10-CM

## 2024-08-02 PROCEDURE — 77063 BREAST TOMOSYNTHESIS BI: CPT

## 2024-10-06 DIAGNOSIS — I10 ESSENTIAL HYPERTENSION: ICD-10-CM

## 2024-10-06 DIAGNOSIS — I25.118 CORONARY ARTERY DISEASE OF NATIVE ARTERY OF NATIVE HEART WITH STABLE ANGINA PECTORIS (HCC): ICD-10-CM

## 2024-10-07 RX ORDER — LOSARTAN POTASSIUM 25 MG/1
25 TABLET ORAL DAILY
Qty: 90 TABLET | Refills: 0 | Status: SHIPPED | OUTPATIENT
Start: 2024-10-07

## 2024-10-07 NOTE — TELEPHONE ENCOUNTER
Please Approve or Refuse.  Send to Pharmacy per Pt's Request:      Next Visit Date:  10/18/2024   Last Visit Date: 7/19/2024    Hemoglobin A1C (%)   Date Value   07/19/2024 6.2   04/18/2024 6.8   12/18/2023 6.3             ( goal A1C is < 7)   BP Readings from Last 3 Encounters:   07/19/24 126/80   07/15/24 (!) 164/79   05/26/24 (!) 152/74          (goal 120/80)  BUN   Date Value Ref Range Status   07/15/2024 17 6 - 20 mg/dL Final     Creatinine   Date Value Ref Range Status   07/15/2024 0.6 0.50 - 0.90 mg/dL Final     Potassium   Date Value Ref Range Status   07/15/2024 4.0 3.7 - 5.3 mmol/L Final

## 2024-10-09 DIAGNOSIS — K21.9 GASTROESOPHAGEAL REFLUX DISEASE WITHOUT ESOPHAGITIS: ICD-10-CM

## 2024-10-09 RX ORDER — FAMOTIDINE 20 MG/1
20 TABLET, FILM COATED ORAL 2 TIMES DAILY
Qty: 60 TABLET | Refills: 0 | Status: SHIPPED | OUTPATIENT
Start: 2024-10-09

## 2024-10-17 DIAGNOSIS — E78.2 MIXED HYPERLIPIDEMIA: ICD-10-CM

## 2024-10-17 DIAGNOSIS — I25.118 CORONARY ARTERY DISEASE OF NATIVE ARTERY OF NATIVE HEART WITH STABLE ANGINA PECTORIS (HCC): ICD-10-CM

## 2024-10-17 RX ORDER — ATORVASTATIN CALCIUM 40 MG/1
40 TABLET, FILM COATED ORAL NIGHTLY
Qty: 90 TABLET | Refills: 0 | Status: SHIPPED | OUTPATIENT
Start: 2024-10-17

## 2024-10-17 NOTE — TELEPHONE ENCOUNTER
Please Approve or Refuse.  Send to Pharmacy per Pt's Request:      Next Visit Date:  10/25/2024   Last Visit Date: 7/19/2024    Hemoglobin A1C (%)   Date Value   07/19/2024 6.2   04/18/2024 6.8   12/18/2023 6.3             ( goal A1C is < 7)   BP Readings from Last 3 Encounters:   07/19/24 126/80   07/15/24 (!) 164/79   05/26/24 (!) 152/74          (goal 120/80)  BUN   Date Value Ref Range Status   07/15/2024 17 6 - 20 mg/dL Final     Creatinine   Date Value Ref Range Status   07/15/2024 0.6 0.50 - 0.90 mg/dL Final     Potassium   Date Value Ref Range Status   07/15/2024 4.0 3.7 - 5.3 mmol/L Final

## 2024-11-12 DIAGNOSIS — E11.42 TYPE 2 DIABETES MELLITUS WITH DIABETIC POLYNEUROPATHY, WITHOUT LONG-TERM CURRENT USE OF INSULIN (HCC): ICD-10-CM

## 2024-11-12 DIAGNOSIS — I25.83 CORONARY ARTERY DISEASE DUE TO LIPID RICH PLAQUE: ICD-10-CM

## 2024-11-12 DIAGNOSIS — K21.9 GASTROESOPHAGEAL REFLUX DISEASE WITHOUT ESOPHAGITIS: ICD-10-CM

## 2024-11-12 DIAGNOSIS — I20.2 REFRACTORY ANGINA PECTORIS (HCC): ICD-10-CM

## 2024-11-12 DIAGNOSIS — I25.10 CORONARY ARTERY DISEASE DUE TO LIPID RICH PLAQUE: ICD-10-CM

## 2024-11-12 RX ORDER — METFORMIN HYDROCHLORIDE 500 MG/1
500 TABLET, EXTENDED RELEASE ORAL
Qty: 30 TABLET | Refills: 3 | Status: SHIPPED | OUTPATIENT
Start: 2024-11-12

## 2024-11-12 RX ORDER — RANOLAZINE 500 MG/1
500 TABLET, EXTENDED RELEASE ORAL 2 TIMES DAILY
Qty: 60 TABLET | Refills: 3 | Status: SHIPPED | OUTPATIENT
Start: 2024-11-12

## 2024-11-12 RX ORDER — FAMOTIDINE 20 MG/1
20 TABLET, FILM COATED ORAL 2 TIMES DAILY
Qty: 60 TABLET | Refills: 3 | Status: SHIPPED | OUTPATIENT
Start: 2024-11-12

## 2024-11-27 DIAGNOSIS — I25.118 CORONARY ARTERY DISEASE OF NATIVE ARTERY OF NATIVE HEART WITH STABLE ANGINA PECTORIS (HCC): ICD-10-CM

## 2024-11-27 RX ORDER — METOPROLOL TARTRATE 25 MG/1
25 TABLET, FILM COATED ORAL 2 TIMES DAILY
Qty: 180 TABLET | Refills: 0 | Status: SHIPPED | OUTPATIENT
Start: 2024-11-27

## 2024-11-27 NOTE — TELEPHONE ENCOUNTER
Please Approve or Refuse.  Send to Pharmacy per Pt's Request: [facility]     Next Visit Date:  12/6/2024   Last Visit Date: 7/19/2024    Hemoglobin A1C (%)   Date Value   07/19/2024 6.2   04/18/2024 6.8   12/18/2023 6.3             ( goal A1C is < 7)   BP Readings from Last 3 Encounters:   07/19/24 126/80   07/15/24 (!) 164/79   05/26/24 (!) 152/74          (goal 120/80)  BUN   Date Value Ref Range Status   07/15/2024 17 6 - 20 mg/dL Final     Creatinine   Date Value Ref Range Status   07/15/2024 0.6 0.50 - 0.90 mg/dL Final     Potassium   Date Value Ref Range Status   07/15/2024 4.0 3.7 - 5.3 mmol/L Final

## 2024-12-06 ENCOUNTER — OFFICE VISIT (OUTPATIENT)
Dept: FAMILY MEDICINE CLINIC | Age: 59
End: 2024-12-06

## 2024-12-06 VITALS
DIASTOLIC BLOOD PRESSURE: 80 MMHG | WEIGHT: 191 LBS | SYSTOLIC BLOOD PRESSURE: 138 MMHG | HEART RATE: 65 BPM | HEIGHT: 66 IN | BODY MASS INDEX: 30.7 KG/M2 | OXYGEN SATURATION: 98 %

## 2024-12-06 DIAGNOSIS — Z87.891 PERSONAL HISTORY OF TOBACCO USE: ICD-10-CM

## 2024-12-06 DIAGNOSIS — I10 PRIMARY HYPERTENSION: ICD-10-CM

## 2024-12-06 DIAGNOSIS — E78.2 MIXED HYPERLIPIDEMIA: ICD-10-CM

## 2024-12-06 DIAGNOSIS — E11.42 TYPE 2 DIABETES MELLITUS WITH DIABETIC POLYNEUROPATHY, WITHOUT LONG-TERM CURRENT USE OF INSULIN (HCC): Primary | ICD-10-CM

## 2024-12-06 DIAGNOSIS — E55.9 VITAMIN D DEFICIENCY: ICD-10-CM

## 2024-12-06 DIAGNOSIS — M72.2 PLANTAR FASCIITIS: ICD-10-CM

## 2024-12-06 DIAGNOSIS — M51.369 DDD (DEGENERATIVE DISC DISEASE), LUMBAR: ICD-10-CM

## 2024-12-06 DIAGNOSIS — J44.9 CHRONIC OBSTRUCTIVE PULMONARY DISEASE, UNSPECIFIED COPD TYPE (HCC): ICD-10-CM

## 2024-12-06 PROBLEM — R07.9 CHEST PAIN: Status: RESOLVED | Noted: 2024-07-14 | Resolved: 2024-12-06

## 2024-12-06 LAB — HBA1C MFR BLD: 5.8 %

## 2024-12-06 RX ORDER — NICOTINE 21 MG/24HR
1 PATCH, TRANSDERMAL 24 HOURS TRANSDERMAL DAILY
Qty: 42 PATCH | Refills: 1 | Status: SHIPPED | OUTPATIENT
Start: 2024-12-06 | End: 2025-02-28

## 2024-12-06 RX ORDER — METHYLPREDNISOLONE 4 MG/1
TABLET ORAL
Qty: 1 KIT | Refills: 0 | Status: SHIPPED | OUTPATIENT
Start: 2024-12-06 | End: 2024-12-12

## 2024-12-06 ASSESSMENT — ENCOUNTER SYMPTOMS
RECTAL PAIN: 0
DIARRHEA: 0
ABDOMINAL PAIN: 0
RHINORRHEA: 0
BACK PAIN: 1
COUGH: 0
NAUSEA: 0
EYE REDNESS: 0
ABDOMINAL DISTENTION: 0
TROUBLE SWALLOWING: 0
VOMITING: 0
WHEEZING: 1
SINUS PRESSURE: 0
BLOOD IN STOOL: 0
STRIDOR: 0
SHORTNESS OF BREATH: 0
CONSTIPATION: 0
SORE THROAT: 0
COLOR CHANGE: 0
CHEST TIGHTNESS: 0

## 2024-12-06 NOTE — PROGRESS NOTES
Chief Complaint   Patient presents with    Annual Exam     Does not want any vaccines     Diabetes    Foot Pain     Right one only goes up to knee          Amy Beaver  here today for follow up on chronic medical problems, go over labs and/or diagnostic studies, and medication refills. Annual Exam (Does not want any vaccines ), Diabetes, and Foot Pain (Right one only goes up to knee )      HPI: Patient is scheduled for follow-up on chronic medical conditions type 2 diabetes.    Type 2 diabetes controlled, A1c is 5.8 patient is currently on metformin reports compliance.  Patient denies any side effects from the medication.  Patient monitors her blood sugars intermittently.  She is due for eye exam discussed with patient to schedule appointment.      Hyperlipidemia stable is due to recheck lipid panel.  Patient is currently on statins reports compliance.    Hypertension controlled is on multiple medications.  Patient did had chest pain back in July and was admitted in the hospital.  She did have a stress test done and echocardiogram that was normal.  Patient still smokes about 2 packs a day.      COPD severe on lung function test.  Patient is currently on multiple inhalers, still smokes 2 packs a day.  Patient up-to-date on CT lung screening.  Patient is ready to quit and could not tolerate the Chantix.  Patient is willing to try nicotine patches.     Patient is complaining of pain in the right heel reports it started about a month ago.  Patient the pain is severe, starts in the morning and usually stays for the whole day.  Patient reports she feels burning sensation in the right heel she denies any acute trauma.  Patient reports she was working 2 jobs and was standing on her feet all day.  Patient did not try any medications except Tylenol.  Patient with that did not help.  Patient symptoms are likely due to plantar fasciitis.    Vitamin D deficiency on supplements.    Patient is up-to-date on mammogram and CT lung 
Out    Hib vaccine  Aged Out    Polio vaccine  Aged Out    Meningococcal (ACWY) vaccine  Aged Out    Diabetes screen  Discontinued    Cervical cancer screen  Discontinued

## 2024-12-08 NOTE — TELEPHONE ENCOUNTER
Please Approve or Refuse.  Send to Pharmacy per Pt's Request:      Next Visit Date:  4/7/2025   Last Visit Date: 12/6/2024    Hemoglobin A1C (%)   Date Value   12/06/2024 5.8   07/19/2024 6.2   04/18/2024 6.8             ( goal A1C is < 7)   BP Readings from Last 3 Encounters:   12/06/24 138/80   07/19/24 126/80   07/15/24 (!) 164/79          (goal 120/80)  BUN   Date Value Ref Range Status   07/15/2024 17 6 - 20 mg/dL Final     Creatinine   Date Value Ref Range Status   07/15/2024 0.6 0.50 - 0.90 mg/dL Final     Potassium   Date Value Ref Range Status   07/15/2024 4.0 3.7 - 5.3 mmol/L Final

## 2025-02-07 ENCOUNTER — APPOINTMENT (OUTPATIENT)
Dept: CT IMAGING | Age: 60
End: 2025-02-07
Payer: COMMERCIAL

## 2025-02-07 ENCOUNTER — APPOINTMENT (OUTPATIENT)
Dept: GENERAL RADIOLOGY | Age: 60
End: 2025-02-07
Payer: COMMERCIAL

## 2025-02-07 ENCOUNTER — HOSPITAL ENCOUNTER (OUTPATIENT)
Age: 60
Setting detail: OBSERVATION
Discharge: HOME | End: 2025-02-08
Attending: EMERGENCY MEDICINE | Admitting: EMERGENCY MEDICINE
Payer: COMMERCIAL

## 2025-02-07 ENCOUNTER — APPOINTMENT (OUTPATIENT)
Dept: MRI IMAGING | Age: 60
End: 2025-02-07
Payer: COMMERCIAL

## 2025-02-07 DIAGNOSIS — E11.42 TYPE 2 DIABETES MELLITUS WITH DIABETIC POLYNEUROPATHY, WITHOUT LONG-TERM CURRENT USE OF INSULIN (HCC): ICD-10-CM

## 2025-02-07 DIAGNOSIS — M54.9 ACUTE BACK PAIN, UNSPECIFIED BACK LOCATION, UNSPECIFIED BACK PAIN LATERALITY: Primary | ICD-10-CM

## 2025-02-07 LAB
ALBUMIN SERPL-MCNC: 4.3 G/DL (ref 3.5–5.2)
ALBUMIN/GLOB SERPL: 1.3 {RATIO} (ref 1–2.5)
ALP SERPL-CCNC: 99 U/L (ref 35–104)
ALT SERPL-CCNC: 11 U/L (ref 10–35)
ANION GAP SERPL CALCULATED.3IONS-SCNC: 10 MMOL/L (ref 9–16)
AST SERPL-CCNC: 17 U/L (ref 10–35)
BASOPHILS # BLD: 0.05 K/UL (ref 0–0.2)
BASOPHILS NFR BLD: 0 % (ref 0–2)
BILIRUB SERPL-MCNC: 0.5 MG/DL (ref 0–1.2)
BILIRUB UR QL STRIP: NEGATIVE
BNP SERPL-MCNC: 63 PG/ML (ref 0–125)
BUN BLD-MCNC: 11 MG/DL (ref 8–26)
BUN SERPL-MCNC: 13 MG/DL (ref 8–23)
CA-I BLD-SCNC: 1.24 MMOL/L (ref 1.15–1.33)
CALCIUM SERPL-MCNC: 9.4 MG/DL (ref 8.6–10.4)
CHLORIDE BLD-SCNC: 105 MMOL/L (ref 98–107)
CHLORIDE SERPL-SCNC: 104 MMOL/L (ref 98–107)
CLARITY UR: CLEAR
CO2 BLD CALC-SCNC: 24 MMOL/L (ref 22–30)
CO2 SERPL-SCNC: 23 MMOL/L (ref 20–31)
COLOR UR: YELLOW
COMMENT: ABNORMAL
CREAT SERPL-MCNC: 0.6 MG/DL (ref 0.6–0.9)
EGFR, POC: >90 ML/MIN/1.73M2
EOSINOPHIL # BLD: <0.03 K/UL (ref 0–0.44)
EOSINOPHILS RELATIVE PERCENT: 0 % (ref 1–4)
ERYTHROCYTE [DISTWIDTH] IN BLOOD BY AUTOMATED COUNT: 13.1 % (ref 11.8–14.4)
GFR, ESTIMATED: >90 ML/MIN/1.73M2
GLUCOSE BLD-MCNC: 139 MG/DL (ref 74–100)
GLUCOSE SERPL-MCNC: 142 MG/DL (ref 74–99)
GLUCOSE UR STRIP-MCNC: NEGATIVE MG/DL
HCO3 VENOUS: 25.4 MMOL/L (ref 22–29)
HCT VFR BLD AUTO: 42.2 % (ref 36.3–47.1)
HCT VFR BLD AUTO: 43 % (ref 36–46)
HGB BLD-MCNC: 13.8 G/DL (ref 11.9–15.1)
HGB UR QL STRIP.AUTO: NEGATIVE
IMM GRANULOCYTES # BLD AUTO: 0.06 K/UL (ref 0–0.3)
IMM GRANULOCYTES NFR BLD: 0 %
KETONES UR STRIP-MCNC: NEGATIVE MG/DL
LEUKOCYTE ESTERASE UR QL STRIP: NEGATIVE
LYMPHOCYTES NFR BLD: 3 K/UL (ref 1.1–3.7)
LYMPHOCYTES RELATIVE PERCENT: 21 % (ref 24–43)
MCH RBC QN AUTO: 27.7 PG (ref 25.2–33.5)
MCHC RBC AUTO-ENTMCNC: 32.7 G/DL (ref 28.4–34.8)
MCV RBC AUTO: 84.7 FL (ref 82.6–102.9)
MONOCYTES NFR BLD: 1.27 K/UL (ref 0.1–1.2)
MONOCYTES NFR BLD: 9 % (ref 3–12)
NEUTROPHILS NFR BLD: 70 % (ref 36–65)
NEUTS SEG NFR BLD: 10.13 K/UL (ref 1.5–8.1)
NITRITE UR QL STRIP: NEGATIVE
NRBC BLD-RTO: 0 PER 100 WBC
O2 SAT, VEN: 88.3 % (ref 60–85)
PCO2 VENOUS: 34.3 MM HG (ref 41–51)
PH UR STRIP: 8.5 [PH] (ref 5–8)
PH VENOUS: 7.48 (ref 7.32–7.43)
PLATELET # BLD AUTO: 200 K/UL (ref 138–453)
PMV BLD AUTO: 11.1 FL (ref 8.1–13.5)
PO2 VENOUS: 50.8 MM HG (ref 30–50)
POC ANION GAP: 15 MMOL/L (ref 7–16)
POC CREATININE: 0.5 MG/DL (ref 0.51–1.19)
POC HEMOGLOBIN (CALC): 14.8 G/DL (ref 12–16)
POC LACTIC ACID: 1.2 MMOL/L (ref 0.56–1.39)
POSITIVE BASE EXCESS, VEN: 2.2 MMOL/L (ref 0–3)
POTASSIUM BLD-SCNC: 3.8 MMOL/L (ref 3.5–4.5)
POTASSIUM SERPL-SCNC: 3.9 MMOL/L (ref 3.7–5.3)
PROT SERPL-MCNC: 7.5 G/DL (ref 6.6–8.7)
PROT UR STRIP-MCNC: NEGATIVE MG/DL
RBC # BLD AUTO: 4.98 M/UL (ref 3.95–5.11)
SODIUM BLD-SCNC: 143 MMOL/L (ref 138–146)
SODIUM SERPL-SCNC: 137 MMOL/L (ref 136–145)
SP GR UR STRIP: 1.04 (ref 1–1.03)
TROPONIN I SERPL HS-MCNC: <6 NG/L (ref 0–14)
TROPONIN I SERPL HS-MCNC: <6 NG/L (ref 0–14)
UROBILINOGEN UR STRIP-ACNC: NORMAL EU/DL (ref 0–1)
WBC OTHER # BLD: 14.5 K/UL (ref 3.5–11.3)

## 2025-02-07 PROCEDURE — 93005 ELECTROCARDIOGRAM TRACING: CPT

## 2025-02-07 PROCEDURE — 82565 ASSAY OF CREATININE: CPT

## 2025-02-07 PROCEDURE — 80051 ELECTROLYTE PANEL: CPT

## 2025-02-07 PROCEDURE — 72146 MRI CHEST SPINE W/O DYE: CPT

## 2025-02-07 PROCEDURE — 6360000004 HC RX CONTRAST MEDICATION

## 2025-02-07 PROCEDURE — 82947 ASSAY GLUCOSE BLOOD QUANT: CPT

## 2025-02-07 PROCEDURE — 81003 URINALYSIS AUTO W/O SCOPE: CPT

## 2025-02-07 PROCEDURE — 83605 ASSAY OF LACTIC ACID: CPT

## 2025-02-07 PROCEDURE — 96366 THER/PROPH/DIAG IV INF ADDON: CPT

## 2025-02-07 PROCEDURE — 99285 EMERGENCY DEPT VISIT HI MDM: CPT

## 2025-02-07 PROCEDURE — 84484 ASSAY OF TROPONIN QUANT: CPT

## 2025-02-07 PROCEDURE — 96374 THER/PROPH/DIAG INJ IV PUSH: CPT

## 2025-02-07 PROCEDURE — 96368 THER/DIAG CONCURRENT INF: CPT

## 2025-02-07 PROCEDURE — 74174 CTA ABD&PLVS W/CONTRAST: CPT

## 2025-02-07 PROCEDURE — 6370000000 HC RX 637 (ALT 250 FOR IP)

## 2025-02-07 PROCEDURE — 96375 TX/PRO/DX INJ NEW DRUG ADDON: CPT

## 2025-02-07 PROCEDURE — 84520 ASSAY OF UREA NITROGEN: CPT

## 2025-02-07 PROCEDURE — 71275 CT ANGIOGRAPHY CHEST: CPT

## 2025-02-07 PROCEDURE — 82330 ASSAY OF CALCIUM: CPT

## 2025-02-07 PROCEDURE — 80053 COMPREHEN METABOLIC PANEL: CPT

## 2025-02-07 PROCEDURE — 85014 HEMATOCRIT: CPT

## 2025-02-07 PROCEDURE — 72125 CT NECK SPINE W/O DYE: CPT

## 2025-02-07 PROCEDURE — 71045 X-RAY EXAM CHEST 1 VIEW: CPT

## 2025-02-07 PROCEDURE — 6360000002 HC RX W HCPCS

## 2025-02-07 PROCEDURE — 96365 THER/PROPH/DIAG IV INF INIT: CPT

## 2025-02-07 PROCEDURE — 82803 BLOOD GASES ANY COMBINATION: CPT

## 2025-02-07 PROCEDURE — 83880 ASSAY OF NATRIURETIC PEPTIDE: CPT

## 2025-02-07 PROCEDURE — 85025 COMPLETE CBC W/AUTO DIFF WBC: CPT

## 2025-02-07 RX ORDER — GABAPENTIN 300 MG/1
300 CAPSULE ORAL ONCE
Status: COMPLETED | OUTPATIENT
Start: 2025-02-07 | End: 2025-02-07

## 2025-02-07 RX ORDER — FAMOTIDINE 20 MG/1
20 TABLET, FILM COATED ORAL ONCE
Status: COMPLETED | OUTPATIENT
Start: 2025-02-07 | End: 2025-02-07

## 2025-02-07 RX ORDER — CLOPIDOGREL BISULFATE 75 MG/1
75 TABLET ORAL ONCE
Status: COMPLETED | OUTPATIENT
Start: 2025-02-07 | End: 2025-02-07

## 2025-02-07 RX ORDER — LABETALOL HYDROCHLORIDE 5 MG/ML
10 INJECTION, SOLUTION INTRAVENOUS ONCE
Status: DISCONTINUED | OUTPATIENT
Start: 2025-02-07 | End: 2025-02-07

## 2025-02-07 RX ORDER — ESMOLOL HYDROCHLORIDE 10 MG/ML
25-300 INJECTION, SOLUTION INTRAVENOUS CONTINUOUS
Status: DISCONTINUED | OUTPATIENT
Start: 2025-02-07 | End: 2025-02-07

## 2025-02-07 RX ORDER — METOPROLOL TARTRATE 50 MG
25 TABLET ORAL ONCE
Status: COMPLETED | OUTPATIENT
Start: 2025-02-07 | End: 2025-02-07

## 2025-02-07 RX ORDER — ATORVASTATIN CALCIUM 20 MG/1
40 TABLET, FILM COATED ORAL ONCE
Status: COMPLETED | OUTPATIENT
Start: 2025-02-07 | End: 2025-02-07

## 2025-02-07 RX ORDER — ESMOLOL HYDROCHLORIDE 10 MG/ML
500 INJECTION INTRAVENOUS ONCE
Status: DISCONTINUED | OUTPATIENT
Start: 2025-02-07 | End: 2025-02-07

## 2025-02-07 RX ORDER — METFORMIN HYDROCHLORIDE 500 MG/1
500 TABLET, EXTENDED RELEASE ORAL ONCE
Status: COMPLETED | OUTPATIENT
Start: 2025-02-07 | End: 2025-02-07

## 2025-02-07 RX ORDER — MORPHINE SULFATE 4 MG/ML
4 INJECTION, SOLUTION INTRAMUSCULAR; INTRAVENOUS
Status: COMPLETED | OUTPATIENT
Start: 2025-02-07 | End: 2025-02-07

## 2025-02-07 RX ORDER — IOPAMIDOL 755 MG/ML
100 INJECTION, SOLUTION INTRAVASCULAR
Status: COMPLETED | OUTPATIENT
Start: 2025-02-07 | End: 2025-02-07

## 2025-02-07 RX ORDER — RANOLAZINE 500 MG/1
500 TABLET, EXTENDED RELEASE ORAL ONCE
Status: COMPLETED | OUTPATIENT
Start: 2025-02-07 | End: 2025-02-07

## 2025-02-07 RX ORDER — NICARDIPINE HYDROCHLORIDE 0.1 MG/ML
2.5-15 INJECTION INTRAVENOUS CONTINUOUS
Status: DISCONTINUED | OUTPATIENT
Start: 2025-02-07 | End: 2025-02-07

## 2025-02-07 RX ORDER — LOSARTAN POTASSIUM 50 MG/1
25 TABLET ORAL DAILY
Status: DISCONTINUED | OUTPATIENT
Start: 2025-02-08 | End: 2025-02-08 | Stop reason: HOSPADM

## 2025-02-07 RX ADMIN — ESMOLOL HYDROCHLORIDE 50 MCG/KG/MIN: 10 INJECTION INTRAVENOUS at 19:21

## 2025-02-07 RX ADMIN — ATORVASTATIN CALCIUM 40 MG: 20 TABLET, FILM COATED ORAL at 22:12

## 2025-02-07 RX ADMIN — MORPHINE SULFATE 4 MG: 4 INJECTION INTRAVENOUS at 19:55

## 2025-02-07 RX ADMIN — METOPROLOL TARTRATE 25 MG: 50 TABLET, FILM COATED ORAL at 22:12

## 2025-02-07 RX ADMIN — NICARDIPINE HYDROCHLORIDE 5 MG/HR: 0.1 INJECTION INTRAVENOUS at 19:10

## 2025-02-07 RX ADMIN — METFORMIN HYDROCHLORIDE 500 MG: 500 TABLET, EXTENDED RELEASE ORAL at 22:44

## 2025-02-07 RX ADMIN — CLOPIDOGREL BISULFATE 75 MG: 75 TABLET ORAL at 22:12

## 2025-02-07 RX ADMIN — RANOLAZINE 500 MG: 500 TABLET, FILM COATED, EXTENDED RELEASE ORAL at 22:44

## 2025-02-07 RX ADMIN — GABAPENTIN 300 MG: 300 CAPSULE ORAL at 22:12

## 2025-02-07 RX ADMIN — IOPAMIDOL 100 ML: 755 INJECTION, SOLUTION INTRAVENOUS at 19:28

## 2025-02-07 RX ADMIN — FAMOTIDINE 20 MG: 20 TABLET, FILM COATED ORAL at 22:12

## 2025-02-07 ASSESSMENT — PAIN DESCRIPTION - LOCATION
LOCATION: BACK;NECK
LOCATION: BACK;NECK

## 2025-02-07 ASSESSMENT — PAIN DESCRIPTION - DESCRIPTORS
DESCRIPTORS: POUNDING;PRESSURE;SHARP
DESCRIPTORS: THROBBING

## 2025-02-07 ASSESSMENT — PAIN - FUNCTIONAL ASSESSMENT
PAIN_FUNCTIONAL_ASSESSMENT: PREVENTS OR INTERFERES WITH MANY ACTIVE NOT PASSIVE ACTIVITIES
PAIN_FUNCTIONAL_ASSESSMENT: ACTIVITIES ARE NOT PREVENTED
PAIN_FUNCTIONAL_ASSESSMENT: 0-10

## 2025-02-07 ASSESSMENT — ENCOUNTER SYMPTOMS
VOMITING: 0
COUGH: 0
DIARRHEA: 0
ABDOMINAL PAIN: 0
BACK PAIN: 1
SHORTNESS OF BREATH: 0
NAUSEA: 0

## 2025-02-07 ASSESSMENT — PAIN DESCRIPTION - FREQUENCY: FREQUENCY: CONTINUOUS

## 2025-02-07 ASSESSMENT — PAIN SCALES - GENERAL
PAINLEVEL_OUTOF10: 9
PAINLEVEL_OUTOF10: 9

## 2025-02-07 ASSESSMENT — PAIN DESCRIPTION - ORIENTATION
ORIENTATION: MID
ORIENTATION: LEFT

## 2025-02-07 ASSESSMENT — PAIN DESCRIPTION - PAIN TYPE: TYPE: ACUTE PAIN

## 2025-02-07 NOTE — ED PROVIDER NOTES
[facility] EMERGENCY DEPARTMENT  Emergency Department Encounter  Emergency Medicine Resident     Pt Name:Amy Beaver  MRN: [id number]  Birthdate 1965  Date of evaluation: 25  PCP:  Jacinda Dietz MD  Note Started: 6:39 PM EST      CHIEF COMPLAINT       Chief Complaint   Patient presents with    Back Pain    Torticollis    Irregular Heart Beat       HISTORY OF PRESENT ILLNESS  (Location/Symptom, Timing/Onset, Context/Setting, Quality, Duration, Modifying Factors, Severity.)      Amy Beaver is a 59 y.o. female who presents with neck pain onset yesterday progressing to back pain down the whole spine today.  Pain now radiates bilaterally to both arms.  Patient says she has pain when she lifts her left arm.  She also notes sensation of heart racing.  She states she had a stent placed in her heart approximately 5 to 6 years ago but has not had a heart attack.    PAST MEDICAL / SURGICAL / SOCIAL / FAMILY HISTORY      has a past medical history of Arthritis, Chronic midline low back pain without sciatica, Coronary artery disease of native artery of native heart with stable angina pectoris (HCC), Essential hypertension, Gastroesophageal reflux disease without esophagitis, H/O vaginal hysterectomy, Mild chronic obstructive pulmonary disease (HCC), Mixed hyperlipidemia, Tobacco abuse, Type 2 diabetes mellitus with hyperglycemia, without long-term current use of insulin (HCC), and Vision abnormalities.       has a past surgical history that includes Tonsillectomy;  section; Hysterectomy; hernia repair (Right); Breast surgery (Left); Colonoscopy (2015); Breast biopsy; Ovary removal; and Coronary stent placement (Right, 2021).      Social History     Socioeconomic History    Marital status:      Spouse name: Not on file    Number of children: Not on file    Years of education: Not on file    Highest education level: Not on file   Occupational History    Not on file   Tobacco

## 2025-02-08 VITALS
TEMPERATURE: 98.2 F | WEIGHT: 193.34 LBS | BODY MASS INDEX: 32.21 KG/M2 | HEIGHT: 65 IN | RESPIRATION RATE: 18 BRPM | HEART RATE: 84 BPM | OXYGEN SATURATION: 94 % | DIASTOLIC BLOOD PRESSURE: 77 MMHG | SYSTOLIC BLOOD PRESSURE: 119 MMHG

## 2025-02-08 PROBLEM — S16.1XXA CERVICAL STRAIN, ACUTE, INITIAL ENCOUNTER: Status: ACTIVE | Noted: 2025-02-08

## 2025-02-08 PROBLEM — M54.10 ACUTE BACK PAIN WITH RADICULOPATHY: Status: ACTIVE | Noted: 2025-02-08

## 2025-02-08 PROBLEM — M48.04 THORACIC SPINAL STENOSIS: Status: ACTIVE | Noted: 2025-02-08

## 2025-02-08 LAB
EKG ATRIAL RATE: 99 BPM
EKG P AXIS: 25 DEGREES
EKG P-R INTERVAL: 152 MS
EKG Q-T INTERVAL: 346 MS
EKG QRS DURATION: 72 MS
EKG QTC CALCULATION (BAZETT): 444 MS
EKG R AXIS: 33 DEGREES
EKG T AXIS: 40 DEGREES
EKG VENTRICULAR RATE: 99 BPM

## 2025-02-08 PROCEDURE — 6370000000 HC RX 637 (ALT 250 FOR IP): Performed by: STUDENT IN AN ORGANIZED HEALTH CARE EDUCATION/TRAINING PROGRAM

## 2025-02-08 PROCEDURE — G0378 HOSPITAL OBSERVATION PER HR: HCPCS

## 2025-02-08 PROCEDURE — 99222 1ST HOSP IP/OBS MODERATE 55: CPT | Performed by: NEUROLOGICAL SURGERY

## 2025-02-08 PROCEDURE — 96375 TX/PRO/DX INJ NEW DRUG ADDON: CPT

## 2025-02-08 PROCEDURE — 6370000000 HC RX 637 (ALT 250 FOR IP)

## 2025-02-08 PROCEDURE — 6360000002 HC RX W HCPCS: Performed by: STUDENT IN AN ORGANIZED HEALTH CARE EDUCATION/TRAINING PROGRAM

## 2025-02-08 PROCEDURE — 93010 ELECTROCARDIOGRAM REPORT: CPT | Performed by: INTERNAL MEDICINE

## 2025-02-08 PROCEDURE — 2500000003 HC RX 250 WO HCPCS: Performed by: STUDENT IN AN ORGANIZED HEALTH CARE EDUCATION/TRAINING PROGRAM

## 2025-02-08 RX ORDER — METOPROLOL TARTRATE 25 MG/1
25 TABLET, FILM COATED ORAL 2 TIMES DAILY
Status: DISCONTINUED | OUTPATIENT
Start: 2025-02-08 | End: 2025-02-08 | Stop reason: HOSPADM

## 2025-02-08 RX ORDER — SODIUM CHLORIDE 9 MG/ML
INJECTION, SOLUTION INTRAVENOUS PRN
Status: DISCONTINUED | OUTPATIENT
Start: 2025-02-08 | End: 2025-02-08 | Stop reason: HOSPADM

## 2025-02-08 RX ORDER — SODIUM CHLORIDE 0.9 % (FLUSH) 0.9 %
5-40 SYRINGE (ML) INJECTION PRN
Status: DISCONTINUED | OUTPATIENT
Start: 2025-02-08 | End: 2025-02-08 | Stop reason: HOSPADM

## 2025-02-08 RX ORDER — RANOLAZINE 500 MG/1
500 TABLET, EXTENDED RELEASE ORAL 2 TIMES DAILY
Status: DISCONTINUED | OUTPATIENT
Start: 2025-02-08 | End: 2025-02-08 | Stop reason: HOSPADM

## 2025-02-08 RX ORDER — CLOPIDOGREL BISULFATE 75 MG/1
75 TABLET ORAL DAILY
Status: DISCONTINUED | OUTPATIENT
Start: 2025-02-08 | End: 2025-02-08 | Stop reason: HOSPADM

## 2025-02-08 RX ORDER — KETOROLAC TROMETHAMINE 15 MG/ML
15 INJECTION, SOLUTION INTRAMUSCULAR; INTRAVENOUS EVERY 6 HOURS PRN
Status: DISCONTINUED | OUTPATIENT
Start: 2025-02-08 | End: 2025-02-08 | Stop reason: HOSPADM

## 2025-02-08 RX ORDER — METHOCARBAMOL 750 MG/1
750 TABLET, FILM COATED ORAL 3 TIMES DAILY
Qty: 30 TABLET | Refills: 0 | Status: SHIPPED | OUTPATIENT
Start: 2025-02-08 | End: 2025-02-18

## 2025-02-08 RX ORDER — SODIUM CHLORIDE 0.9 % (FLUSH) 0.9 %
5-40 SYRINGE (ML) INJECTION EVERY 12 HOURS SCHEDULED
Status: DISCONTINUED | OUTPATIENT
Start: 2025-02-08 | End: 2025-02-08 | Stop reason: HOSPADM

## 2025-02-08 RX ORDER — ACETAMINOPHEN 325 MG/1
650 TABLET ORAL EVERY 4 HOURS PRN
Status: DISCONTINUED | OUTPATIENT
Start: 2025-02-08 | End: 2025-02-08 | Stop reason: HOSPADM

## 2025-02-08 RX ORDER — FAMOTIDINE 20 MG/1
20 TABLET, FILM COATED ORAL 2 TIMES DAILY
Status: DISCONTINUED | OUTPATIENT
Start: 2025-02-08 | End: 2025-02-08 | Stop reason: HOSPADM

## 2025-02-08 RX ORDER — ONDANSETRON 2 MG/ML
4 INJECTION INTRAMUSCULAR; INTRAVENOUS EVERY 6 HOURS PRN
Status: DISCONTINUED | OUTPATIENT
Start: 2025-02-08 | End: 2025-02-08 | Stop reason: HOSPADM

## 2025-02-08 RX ORDER — ATORVASTATIN CALCIUM 40 MG/1
40 TABLET, FILM COATED ORAL NIGHTLY
Status: DISCONTINUED | OUTPATIENT
Start: 2025-02-08 | End: 2025-02-08 | Stop reason: HOSPADM

## 2025-02-08 RX ORDER — ENOXAPARIN SODIUM 100 MG/ML
40 INJECTION SUBCUTANEOUS DAILY
Status: DISCONTINUED | OUTPATIENT
Start: 2025-02-08 | End: 2025-02-08 | Stop reason: HOSPADM

## 2025-02-08 RX ORDER — ONDANSETRON 4 MG/1
4 TABLET, ORALLY DISINTEGRATING ORAL EVERY 8 HOURS PRN
Status: DISCONTINUED | OUTPATIENT
Start: 2025-02-08 | End: 2025-02-08 | Stop reason: HOSPADM

## 2025-02-08 RX ORDER — METHOCARBAMOL 750 MG/1
750 TABLET, FILM COATED ORAL 3 TIMES DAILY
Status: DISCONTINUED | OUTPATIENT
Start: 2025-02-08 | End: 2025-02-08 | Stop reason: HOSPADM

## 2025-02-08 RX ORDER — GABAPENTIN 300 MG/1
300 CAPSULE ORAL 2 TIMES DAILY
Status: DISCONTINUED | OUTPATIENT
Start: 2025-02-08 | End: 2025-02-08 | Stop reason: HOSPADM

## 2025-02-08 RX ADMIN — METHOCARBAMOL 750 MG: 750 TABLET ORAL at 08:59

## 2025-02-08 RX ADMIN — METOPROLOL TARTRATE 25 MG: 25 TABLET, FILM COATED ORAL at 08:59

## 2025-02-08 RX ADMIN — METHOCARBAMOL 750 MG: 750 TABLET ORAL at 14:23

## 2025-02-08 RX ADMIN — SODIUM CHLORIDE, PRESERVATIVE FREE 10 ML: 5 INJECTION INTRAVENOUS at 09:02

## 2025-02-08 RX ADMIN — FAMOTIDINE 20 MG: 20 TABLET, FILM COATED ORAL at 08:59

## 2025-02-08 RX ADMIN — KETOROLAC TROMETHAMINE 15 MG: 15 INJECTION, SOLUTION INTRAMUSCULAR; INTRAVENOUS at 09:00

## 2025-02-08 RX ADMIN — GABAPENTIN 300 MG: 300 CAPSULE ORAL at 08:59

## 2025-02-08 RX ADMIN — RANOLAZINE 500 MG: 500 TABLET, EXTENDED RELEASE ORAL at 08:58

## 2025-02-08 RX ADMIN — CLOPIDOGREL BISULFATE 75 MG: 75 TABLET ORAL at 09:00

## 2025-02-08 RX ADMIN — DICLOFENAC SODIUM 2 G: 10 GEL TOPICAL at 14:23

## 2025-02-08 RX ADMIN — METHOCARBAMOL 750 MG: 750 TABLET ORAL at 01:26

## 2025-02-08 RX ADMIN — LOSARTAN POTASSIUM 25 MG: 50 TABLET, FILM COATED ORAL at 08:59

## 2025-02-08 ASSESSMENT — PAIN SCALES - WONG BAKER
WONGBAKER_NUMERICALRESPONSE: NO HURT

## 2025-02-08 ASSESSMENT — PAIN DESCRIPTION - LOCATION
LOCATION: BACK
LOCATION: BACK

## 2025-02-08 ASSESSMENT — PAIN SCALES - GENERAL
PAINLEVEL_OUTOF10: 4
PAINLEVEL_OUTOF10: 0
PAINLEVEL_OUTOF10: 6

## 2025-02-08 NOTE — ED NOTES
ED to inpatient nurses report      Chief Complaint:  Chief Complaint   Patient presents with    Back Pain    Torticollis    Irregular Heart Beat     Present to ED from: home    MOA:     LOC: alert and orientated to name, place, date  Mobility: Independent  Oxygen Baseline: RA    Current needs required: 2l NC   Pending ED orders: none  Present condition: stable, alert     Why did the patient come to the ED? Back pain  What is the plan? Admission   Any procedures or intervention occur? EKG, consults, VS, labs, meds, MRI, CT  Any safety concerns??    Mental Status:  Level of Consciousness: Alert (0)    Psych Assessment:   Psychosocial  Psychosocial (WDL): Within Defined Limits  Vital signs   Vitals:    02/07/25 2330 02/08/25 0030 02/08/25 0035 02/08/25 0100   BP: 124/74 120/77  116/79   Pulse:       Resp:       Temp:       TempSrc:       SpO2: 90% (!) 89% 91% 90%   Weight:       Height:            Vitals:  Patient Vitals for the past 24 hrs:   BP Temp Temp src Pulse Resp SpO2 Height Weight   02/08/25 0100 116/79 -- -- -- -- 90 % -- --   02/08/25 0035 -- -- -- -- -- 91 % -- --   02/08/25 0030 120/77 -- -- -- -- (!) 89 % -- --   02/07/25 2330 124/74 -- -- -- -- 90 % -- --   02/07/25 2300 123/78 -- -- -- -- 90 % -- --   02/07/25 2230 134/85 -- -- -- -- 93 % -- --   02/07/25 2216 -- -- -- -- -- 93 % -- --   02/07/25 2212 126/81 -- -- 92 -- -- -- --   02/07/25 2200 126/81 -- -- -- -- (!) 89 % -- --   02/07/25 2120 121/70 -- -- 94 14 92 % -- --   02/07/25 2115 123/80 -- -- 95 17 91 % -- --   02/07/25 2111 -- -- -- -- -- 90 % -- --   02/07/25 2110 121/69 -- -- 91 17 -- -- --   02/07/25 2105 120/74 -- -- 95 15 93 % -- --   02/07/25 2100 120/69 -- -- 91 15 93 % -- --   02/07/25 2035 129/70 -- -- 92 13 93 % -- --   02/07/25 2030 129/74 -- -- 94 17 91 % -- --   02/07/25 2025 (!) 141/77 -- -- 92 12 93 % -- --   02/07/25 1920 (!) 167/84 -- -- 99 -- -- -- --   02/07/25 1900 (!) 147/126 -- -- 95 16 95 % -- --   02/07/25 1845 (!)  APPOINTMENT FOR FURTHER REFILLS    DICLOFENAC SODIUM (EQ ARTHRITIS PAIN RELIEVER) 1 % GEL    Apply 2 g topically 4 times daily as needed for Pain    FAMOTIDINE (PEPCID) 20 MG TABLET    Take 1 tablet by mouth twice daily    GABAPENTIN (NEURONTIN) 300 MG CAPSULE    Take 1 capsule by mouth 2 times daily for 30 days.    HANDICAP PLACARD MISC    by Does not apply route Expires July 1, 2027    LANCETS MISC    1 each by Does not apply route 2 times daily    LANCETS MISC    Testing once a day.  Please dispense according to patients insurance.    LOSARTAN (COZAAR) 25 MG TABLET    Take 1 tablet by mouth once daily    METFORMIN (GLUCOPHAGE-XR) 500 MG EXTENDED RELEASE TABLET    Take 1 tablet by mouth once daily with breakfast    METOPROLOL TARTRATE (LOPRESSOR) 25 MG TABLET    Take 1 tablet by mouth twice daily    NICOTINE (NICODERM CQ) 21 MG/24HR    Place 1 patch onto the skin daily    RANOLAZINE (RANEXA) 500 MG EXTENDED RELEASE TABLET    Take 1 tablet by mouth twice daily     Orders Placed This Encounter   Medications    DISCONTD: labetalol (NORMODYNE;TRANDATE) injection 10 mg    DISCONTD: esmolol (BREVIBLOC) injection 40 mg    DISCONTD: esmolol (BREVIBLOC) 2.5gm/250ml NS infusion     Order Specific Question:   Titrate Infusion?     Answer:   Yes     Order Specific Question:   Initial Infusion Dose:     Answer:   50 mcg/kg/min     Order Specific Question:   Titrate no more frequently than every 5 minutes in increments of:     Answer:   50 mcg/kg/min     Order Specific Question:   Goal of Therapy:     Answer:   Other     Order Specific Question:   Other Goal:     Answer:   HR to 60     Order Specific Question:   Contact Provider if:     Answer:   HR less than 60 bpm     Order Specific Question:   HOLD for:     Answer:   Do not hold    DISCONTD: niCARdipine (CARDENE) 20 mg in 0.9 % sodium chloride 200 mL solution     Order Specific Question:   Titrate Infusion?     Answer:   Yes     Order Specific Question:   Initial Infusion

## 2025-02-08 NOTE — CONSULTS
Department of Neurosurgery                                            Nurse Practitioner Consult Note      Reason for Consult:  Thoracic disc bulge, canal stenosis severe back   Requesting Physician:  Grayson  Neurosurgeon:   [x] Dr. Minaya  [] Dr. Correia  [] Dr. Hendrix  [] Dr. Mann    Neurosurgery notified of consult at: 0030  Neurosurgery evaluation begun: 0040    History Obtained From:  patient    CHIEF COMPLAINT:         Chief Complaint   Patient presents with    Back Pain    Torticollis    Irregular Heart Beat       HISTORY OF PRESENT ILLNESS:       The patient is a 59 y.o. female with history of chronic low back pain presented to the emergency department for neck pain that started 2/6/24 when she woke up, paient reports that it started to radiate to her upper back. She was having increased pain when she attempted to raise both of her arms.Patient was treated wih gabapetin and morphine.  CT of the cervical spine was performed with no acute abnormality identified additional CT of the thoracic and lumbar spine were performed that identified Degenerative disc disease with disc herniation and significant central spinal stenosis at T10-T11 and T11-T12. Severe central stenosis at L4-L5 related to mild disc bulge and severe bilateral facet arthropathy. Foraminal stenosis worse on the right. On my exam patient continues to report neck pain denies any radicular pain no bowel or bladder symptoms, no sensory level. Upon palpation patient complains of paraspinal muscular pain of the neck and betweek her shoulder blades.       PAST MEDICAL HISTORY :       Past Medical History:        Diagnosis Date    Arthritis     Chronic midline low back pain without sciatica 7/12/2021    Coronary artery disease of native artery of native heart with stable angina pectoris (HCC) 7/12/2021    Essential hypertension 11/17/2022    Gastroesophageal reflux disease without esophagitis 5/19/2017    H/O vaginal hysterectomy 2006     spine may be more beneficial in demonstrating   degenerative disc disease and neural compression.         CT CSpine W/O Contrast   Final Result   No acute abnormality of the cervical spine.         XR CHEST PORTABLE   Final Result   Mild congestion.  Otherwise, no acute process.                 ASSESSMENT AND PLAN:       Patient Active Problem List   Diagnosis    S/P hysterectomy    Current smoker    Gastroesophageal reflux disease without esophagitis    Coronary artery disease    Mixed hyperlipidemia    History of heart artery stent    Chronic midline low back pain without sciatica    S/P lumpectomy, left breast    S/P tonsillectomy    Type 2 diabetes mellitus with diabetic polyneuropathy, without long-term current use of insulin (HCC)    Mild chronic obstructive pulmonary disease (HCC)    Influenza vaccination declined    Primary osteoarthritis of both knees    Chronic tension-type headache, intractable    Vitamin D deficiency    Primary hypertension    Onychomycosis    DDD (degenerative disc disease), lumbar    Varicose veins of both upper extremities    Closed nondisplaced fracture of scaphoid of right wrist with routine healing    Plantar fasciitis         A/P:  This is a 59 y.o. female neck pain for the last two days that radiated between her shoulder blades found tho have stenosis in her lower thoracic and lumbar spine   Patient care will be discussed with attending, will reevaluated patient along with attending.      - Neurosurgical intervention pending review  by attending neurosurgeon   - Recommend NSAIDS  - Recommend muscle relaxer   - No HOB restrictions    - Neuro checks per protocol   - Admit to observation for re evaluation in am   - Ok to begin prophylactic anticoagulation from neurosurgery stand point. However, we recommend careful evaluation of all other risk factors associated with anticoagulation therapy as applied to this patient's medical condition          Additional recommendations may

## 2025-02-08 NOTE — PROGRESS NOTES
Orthostatic blood pressure's and pulses    Lying   BP: 137/80  Pulse: 84    Sitting   BP: 123/85  Pulse: 89    Standing   BP: 131/89  Pulse: 90    Pt stated that she woke up with a pounding headache. Pt also stated that her headache started last night, but that she wakes up every morning with a headache. Pt moved positions with ease and seems comfortable otherwise. Pt up to the bathroom independently and now resting comfortably in bed. RN notified.

## 2025-02-08 NOTE — DISCHARGE INSTRUCTIONS
You were evaluated for your neck and back pain.  Your imaging did show degenerative disc disease and spinal compression.  Neurosurgery has evaluated you and deemed you stable for discharge.  There is no need for acute intervention at this time.  Please continue to take your muscle relaxant, Robaxin, as prescribed.  Continue over-the-counter pain medications as needed.  Please follow-up with your primary care provider for reevaluation.  Return to the emergency department if you develop difficulty breathing, difficulty swallowing, weakness, numbness, or other concerning symptoms.

## 2025-02-08 NOTE — ED NOTES
Pt arrives to ED 41 via triage.   Pt co neck pain that began yesterday and today pain began to travel to her upper back.   Pt states that she has a hx of COPD and CHF.   Pt states that she has a hx of a stent.   Pt denies any MI's, but states there was a 95% blockage.  Pt states she has a hx of hypertension.   Pt denies taking her BP meds today.   Pt denies taking any pain medications at home.   Pt respirations are even and unlabored, pt is alert and oriented X 4, speaking in complete sentences, bed is in the lowest position, call light is within reach, NAD noted.   Will continue to follow plan of care.

## 2025-02-08 NOTE — PROGRESS NOTES
[facility]  CDU / OBSERVATION eNCOUnter  Attending NOte       I performed a history and physical examination of the patient and discussed management with the resident.  This patient was placed in the observation unit for reevaluation for possible admission to the hospital. I reviewed the resident’s note and agree with the documented findings and plan of care. Any areas of disagreement are noted on the chart. I was personally present for the key portions of any procedures. I have documented in the chart those procedures where I was not present during the key portions. I have reviewed the nurses notes. I agree with the chief complaint, past medical history, past surgical history, allergies, medications, social and family history as documented unless otherwise noted below.    The patient was placed in the observation unit for reevaluation for possible admission to the hospital.      The Family history, social history, and ROS are effectively unchanged since admission unless noted elsewhere in the chart.    MRI results reviewed. Neurosurgery consulted and recommendations are pending.     April Torres MD  Attending Emergency  Physician

## 2025-02-08 NOTE — ED PROVIDER NOTES
[facility]     Emergency Department     Faculty Note/ Attestation      Pt Name: Amy Beaver                                       MRN: [id number]  Birthdate 1965  Date of evaluation: 2/7/2025    Patients PCP:    Jacinda Dietz MD    Note Started: 8:35 PM EST      Attestation  I performed a history and physical examination of the patient and discussed management with the resident. I reviewed the resident’s note and agree with the documented findings and plan of care. Any areas of disagreement are noted on the chart. I was personally present for the key portions of any procedures. I have documented in the chart those procedures where I was not present during the key portions. I have reviewed the emergency nurses triage note. I agree with the chief complaint, past medical history, past surgical history, allergies, medications, social and family history as documented unless otherwise noted below.    For Physician Assistant/ Nurse Practitioner cases/documentation I have personally evaluated this patient and have completed at least one if not all key elements of the E/M (history, physical exam, and MDM). Additional findings are as noted.      Initial Screens:        Jonh Coma Scale  Eye Opening: Spontaneous  Best Verbal Response: Oriented  Best Motor Response: Obeys commands  Jonh Coma Scale Score: 15    Vitals:    Vitals:    02/07/25 1845 02/07/25 1900 02/07/25 1920 02/07/25 2025   BP: (!) 173/100 (!) 147/126 (!) 167/84 (!) 141/77   Pulse: 97 95 99 92   Resp: 13 16  12   Temp:       TempSrc:       SpO2: 95% 95%  93%   Weight:       Height:           CHIEF COMPLAINT       Chief Complaint   Patient presents with    Back Pain    Torticollis    Irregular Heart Beat             DIAGNOSTIC RESULTS             RADIOLOGY:   CTA CHEST W WO CONTRAST   Final Result   1. No evidence of acute aortic dissection.  No acute aortic pathology.   2. No acute inflammatory or obstructive process seen

## 2025-02-08 NOTE — PLAN OF CARE
NO ACUTE NEUROSURGICAL INTERVENTION AT THIS TIME    NEUROSURGERY TO SIGN OFF     Please contact Neurosurgery with any questions or acute changes in neurological exam    Ok for discharge from neurosurgery standpoint      Electronically signed by ZAK Barreto NP on 2/8/2025 at 3:25 PM

## 2025-02-08 NOTE — ED PROVIDER NOTES
[facility] EMERGENCY DEPARTMENT  Emergency Department  Emergency Medicine Resident Turn-Over     Note Started: 8:40 PM EST    Care of Amy Beaver was assumed from Dr. Colunga and is being seen for Back Pain, Torticollis, and Irregular Heart Beat  .  The patient's initial evaluation and plan have been discussed with the prior provider who initially evaluated the patient.     EMERGENCY DEPARTMENT COURSE / MEDICAL DECISION MAKING:       MEDICATIONS GIVEN:  Orders Placed This Encounter   Medications    DISCONTD: labetalol (NORMODYNE;TRANDATE) injection 10 mg    DISCONTD: esmolol (BREVIBLOC) injection 40 mg    DISCONTD: esmolol (BREVIBLOC) 2.5gm/250ml NS infusion     Order Specific Question:   Titrate Infusion?     Answer:   Yes     Order Specific Question:   Initial Infusion Dose:     Answer:   50 mcg/kg/min     Order Specific Question:   Titrate no more frequently than every 5 minutes in increments of:     Answer:   50 mcg/kg/min     Order Specific Question:   Goal of Therapy:     Answer:   Other     Order Specific Question:   Other Goal:     Answer:   HR to 60     Order Specific Question:   Contact Provider if:     Answer:   HR less than 60 bpm     Order Specific Question:   HOLD for:     Answer:   Do not hold    DISCONTD: niCARdipine (CARDENE) 20 mg in 0.9 % sodium chloride 200 mL solution     Order Specific Question:   Titrate Infusion?     Answer:   Yes     Order Specific Question:   Initial Infusion Rate:     Answer:   5 mg/hr     Order Specific Question:   Goal of Therapy is:     Answer:   Other     Order Specific Question:   Other Goal:     Answer:   SBP <110     Order Specific Question:   Contact Provider if:     Answer:   Patient is receiving the maximum dose and is not achieving the goal of therapy    iopamidol (ISOVUE-370) 76 % injection 100 mL    morphine injection 4 mg    metoprolol tartrate (LOPRESSOR) tablet 25 mg    losartan (COZAAR) tablet 25 mg    atorvastatin (LIPITOR) tablet 40  achievable.; CTA of the chest was performed before and after the administration of intravenous contrast.  Multiplanar reformatted images are provided for review.  MIP images are provided for review. Automated exposure control, iterative reconstruction, and/or weight based adjustment of the mA/kV was utilized to reduce the radiation dose to as low as reasonably achievable. COMPARISON: None. HISTORY: ORDERING SYSTEM PROVIDED HISTORY: Elevated BP, back pain, neck pain TECHNOLOGIST PROVIDED HISTORY: Elevated BP, back pain, neck pain Additional Contrast?->1 FINDINGS: CTA: No evidence of aortic dissection.  No intramural hematoma.  The ascending thoracic aorta measures 3.5 cm.  The great vessels originate normal sequential manner without evidence of high-grade stenosis. The abdominal aorta is normal course and caliber without aneurysm.  A small amount of calcified noncalcified mural thrombus seen within the infrarenal portion.  No aneurysm.  The celiac, SMA and DARWIN demonstrates no high-grade stenosis.  Mild atherosclerotic disease.  Standard branch anatomy with an accessory left hepatic artery, normal variant. There are 2 right renal arteries.  There is a single left renal artery. Bilateral common, external and internal arteries demonstrate mild atherosclerotic disease.  Bilateral common femoral bifurcations demonstrate no aneurysm. CHEST: Lines and tubes: None Mediastinum and Hilum: No enlarged lymph nodes Heart and Vasculature: Not enlarged Pleura: Bilateral dependent atelectasis.  No acute consolidation.  The central airways are patent. Lungs and airways: Clear ABDOMEN/PELVIS: EG junction, stomach and duodenal sweep: Unremarkable Liver: Unremarkable Gallbladder: Unremarkable Biliary tree: Unremarkable Pancreas: Unremarkable for patient's age. Spleen: Unremarkable Kidneys and ureters: Unremarkable Adrenal glands: Unremarkable Retroperitoneal structures:  Unremarkable Small bowel and colon: Descending sigmoid

## 2025-02-08 NOTE — H&P
[facility]  CDU / OBSERVATION ENCOUNTER  Physician NOTE     Pt Name: Amy Beaver  MRN: [id number]  Birthdate 1965  Date of evaluation: 2/8/25  Patient's PCP is :  Jacinda Dietz MD    CHIEF COMPLAINT       Chief Complaint   Patient presents with    Back Pain    Torticollis    Irregular Heart Beat         HISTORY OF PRESENT ILLNESS    Amy Beaver is a 59 y.o. female past medical history of coronary artery disease, hypertension, type 2 diabetes, chronic low back pain, arthritis who presents with neck and back pain that had sudden onset 2 days ago.  While in the emergency department patient also reported that she had pain radiating into both of her arms, however she denies this for the me this morning.  Patient denies any trauma.  No bowel or bladder incontinence.  No saddle anesthesia.  No urinary retention.    Initially in the emergency department they were actually concerned for aortic dissection due to patient's presentation of back pain and arm pain with hypertension so she was started on Cardene and esmolol was ordered as well.  CTA of the chest and CTA of the abdomen and pelvis were negative for any aortic pathology.    Further imaging included CT thoracic and lumbar spine which showed degenerative disc disease and disc herniation with significant central spinal stenosis at T10-T11 as well as at T11-T12.  There is also severe central stenosis at L4-L5 due to disc bulging and bilateral facet arthropathy, MRI of thoracic spine was recommended and completed, impression as follows:    IMPRESSION:  1. Mild levoscoliosis in the mid and upper thoracic spine. Evidence of  generalized mild-to-moderate kyphotic curve in the thoracic spine.  2. Mild compression of ventral aspect of the spinal cord at T6-T7 level due  to disc bulge and disc herniation.  3. Mild compression of the left ventral aspect of the spinal cord at T11-T12  level due to left paracentral asymmetric disc bulge and 
Yes

## 2025-02-09 DIAGNOSIS — Z87.891 PERSONAL HISTORY OF TOBACCO USE: ICD-10-CM

## 2025-02-09 DIAGNOSIS — I25.118 CORONARY ARTERY DISEASE OF NATIVE ARTERY OF NATIVE HEART WITH STABLE ANGINA PECTORIS (HCC): ICD-10-CM

## 2025-02-09 DIAGNOSIS — I10 ESSENTIAL HYPERTENSION: ICD-10-CM

## 2025-02-09 NOTE — TELEPHONE ENCOUNTER
Please Approve or Refuse.  Send to Pharmacy per Pt's Request:      Next Visit Date:  4/7/2025   Last Visit Date: 12/6/2024    Hemoglobin A1C (%)   Date Value   12/06/2024 5.8   07/19/2024 6.2   04/18/2024 6.8             ( goal A1C is < 7)   BP Readings from Last 3 Encounters:   02/08/25 119/77   12/06/24 138/80   07/19/24 126/80          (goal 120/80)  BUN   Date Value Ref Range Status   02/07/2025 13 8 - 23 mg/dL Final     Creatinine   Date Value Ref Range Status   02/07/2025 0.6 0.6 - 0.9 mg/dL Final     POC Creatinine   Date Value Ref Range Status   02/07/2025 0.5 (L) 0.51 - 1.19 mg/dL Final     Potassium   Date Value Ref Range Status   02/07/2025 3.9 3.7 - 5.3 mmol/L Final

## 2025-02-10 ENCOUNTER — TELEPHONE (OUTPATIENT)
Dept: FAMILY MEDICINE CLINIC | Age: 60
End: 2025-02-10

## 2025-02-10 RX ORDER — NICOTINE 21 MG/24HR
1 PATCH, TRANSDERMAL 24 HOURS TRANSDERMAL DAILY
Qty: 42 PATCH | Refills: 1 | Status: SHIPPED | OUTPATIENT
Start: 2025-02-10 | End: 2025-05-05

## 2025-02-10 RX ORDER — LOSARTAN POTASSIUM 25 MG/1
25 TABLET ORAL DAILY
Qty: 90 TABLET | Refills: 0 | Status: SHIPPED | OUTPATIENT
Start: 2025-02-10

## 2025-02-10 NOTE — TELEPHONE ENCOUNTER
Care Transitions Initial Follow Up Call    Outreach made within 2 business days of discharge: Yes    Patient: Amy Beaver Patient : 1965   MRN: [id number]  Reason for Admission: ACUTE  Discharge Date: 25       Spoke with: #1 LMOM    Discharge department/facility: ACUTE BACK PAIN    TCM Interactive Patient Contact:  Was patient able to fill all prescriptions:   Was patient instructed to bring all medications to the follow-up visit:   Is patient taking all medications as directed in the discharge summary?   Does patient understand their discharge instructions:   Does patient have questions or concerns that need addressed prior to 7-14 day follow up office visit:     Additional needs identified to be addressed with provider               Scheduled appointment with PCP within 7-14 days    Follow Up  Future Appointments   Date Time Provider Department Center   3/10/2025  9:45 AM Brock Higgins MD [facility]   2025 12:30 PM Jacinda Dietz MD [facility]       Dai Segura MA

## 2025-02-11 ENCOUNTER — TELEPHONE (OUTPATIENT)
Dept: FAMILY MEDICINE CLINIC | Age: 60
End: 2025-02-11

## 2025-02-11 NOTE — TELEPHONE ENCOUNTER
Care Transitions Initial Follow Up Call    Outreach made within 2 business days of discharge: Yes    Patient: Amy Beaver Patient : 1965   MRN: [id number]  Reason for Admission: ACUTE BACK PAIN  Discharge Date: 25       Spoke with: #2 LMOM    Discharge department/facility: [facility] Interactive Patient Contact:  Was patient able to fill all prescriptions:   Was patient instructed to bring all medications to the follow-up visit:   Is patient taking all medications as directed in the discharge summary?   Does patient understand their discharge instructions:   Does patient have questions or concerns that need addressed prior to 7-14 day follow up office visit:     Additional needs identified to be addressed with provider               Scheduled appointment with PCP within 7-14 days    Follow Up  Future Appointments   Date Time Provider Department Center   3/10/2025  9:45 AM Brock Higgins MD [facility]   2025 12:30 PM Jacinda Dietz MD [facility]       Dai Segura MA

## 2025-02-13 NOTE — DISCHARGE SUMMARY
CDU Discharge Summary        Patient:  Amy Beaver  YOB: 1965    MRN: [id number]   Acct: [id number]    Primary Care Physician: Jacinda Dietz MD    Admit date:  2/7/2025  6:35 PM  Discharge date: 2/8/2025  4:35 PM     Discharge Diagnoses:     1.)  Patient had neck and back pain with sudden onset due to chronic spine disease.  Treated with analgesics.  Patient's symptoms are improving with the plan to follow-up outpatient with neurosurgery.    Follow-up:  Call today/tomorrow for a follow up appointment with Jacinda Dietz MD , or return to the Emergency Room with worsening symptoms    Stressed to patient the importance of following up with primary care doctor for further workup/management of symptoms.  Pt verbalizes understanding and agrees with plan.    Discharge Medication Changes:       Medication List        START taking these medications      methocarbamol 750 MG tablet  Commonly known as: ROBAXIN  Take 1 tablet by mouth 3 times daily for 10 days            CONTINUE taking these medications      acetaminophen 500 MG tablet  Commonly known as: TYLENOL  Take 1 tablet by mouth every 6 hours as needed for Pain     albuterol sulfate  (90 Base) MCG/ACT inhaler  Commonly known as: PROVENTIL;VENTOLIN;PROAIR  Inhale 2 puffs into the lungs every 6 hours as needed for Wheezing or Shortness of Breath     Alcohol Prep Pads  Use as directed     aspirin 81 MG EC tablet  Take 1 tablet by mouth daily     atorvastatin 40 MG tablet  Commonly known as: LIPITOR  Take 1 tablet by mouth nightly     * Blood Glucose Monitor System w/Device Kit  Use as directed. Check glucose levels FASTING DAILY and as needed     * blood glucose monitor kit and supplies  Test one time a day & as needed for symptoms of irregular blood glucose.     blood glucose test strips  Testing once a day.  Please dispense according to patients insurance.     budesonide-formoterol 160-4.5 MCG/ACT Aero  Commonly known as: Symbicort  Inhale

## 2025-02-21 DIAGNOSIS — I25.118 CORONARY ARTERY DISEASE OF NATIVE ARTERY OF NATIVE HEART WITH STABLE ANGINA PECTORIS: ICD-10-CM

## 2025-02-21 RX ORDER — METOPROLOL TARTRATE 25 MG/1
25 TABLET, FILM COATED ORAL 2 TIMES DAILY
Qty: 180 TABLET | Refills: 0 | Status: SHIPPED | OUTPATIENT
Start: 2025-02-21

## 2025-02-21 NOTE — TELEPHONE ENCOUNTER
Please Approve or Refuse.  Send to Pharmacy per Pt's Request: [facility]      Next Visit Date:  4/7/2025   Last Visit Date: 12/6/2024    Hemoglobin A1C (%)   Date Value   12/06/2024 5.8   07/19/2024 6.2   04/18/2024 6.8             ( goal A1C is < 7)   BP Readings from Last 3 Encounters:   02/08/25 119/77   12/06/24 138/80   07/19/24 126/80          (goal 120/80)  BUN   Date Value Ref Range Status   02/07/2025 13 8 - 23 mg/dL Final     Creatinine   Date Value Ref Range Status   02/07/2025 0.6 0.6 - 0.9 mg/dL Final     POC Creatinine   Date Value Ref Range Status   02/07/2025 0.5 (L) 0.51 - 1.19 mg/dL Final     Potassium   Date Value Ref Range Status   02/07/2025 3.9 3.7 - 5.3 mmol/L Final

## 2025-03-21 ENCOUNTER — HOSPITAL ENCOUNTER (OUTPATIENT)
Age: 60
Discharge: HOME | End: 2025-03-21
Payer: COMMERCIAL

## 2025-03-21 DIAGNOSIS — E78.2 MIXED HYPERLIPIDEMIA: ICD-10-CM

## 2025-03-21 DIAGNOSIS — E55.9 VITAMIN D DEFICIENCY: ICD-10-CM

## 2025-03-21 DIAGNOSIS — I10 PRIMARY HYPERTENSION: ICD-10-CM

## 2025-03-21 DIAGNOSIS — E11.42 TYPE 2 DIABETES MELLITUS WITH DIABETIC POLYNEUROPATHY, WITHOUT LONG-TERM CURRENT USE OF INSULIN (HCC): ICD-10-CM

## 2025-03-21 LAB
25(OH)D3 SERPL-MCNC: 17 NG/ML (ref 30–100)
ALBUMIN SERPL-MCNC: 3.9 G/DL (ref 3.5–5.2)
ALP SERPL-CCNC: 89 U/L (ref 35–104)
ALT SERPL-CCNC: 10 U/L (ref 10–35)
ANION GAP SERPL CALCULATED.3IONS-SCNC: 8 MMOL/L (ref 9–16)
AST SERPL-CCNC: 18 U/L (ref 10–35)
BASOPHILS # BLD: 0.1 K/UL (ref 0–0.2)
BASOPHILS NFR BLD: 1 % (ref 0–2)
BILIRUB SERPL-MCNC: <0.2 MG/DL (ref 0–1.2)
BUN SERPL-MCNC: 9 MG/DL (ref 8–23)
CALCIUM SERPL-MCNC: 9.5 MG/DL (ref 8.6–10.4)
CHLORIDE SERPL-SCNC: 107 MMOL/L (ref 98–107)
CHOLEST SERPL-MCNC: 250 MG/DL (ref 0–199)
CHOLESTEROL/HDL RATIO: 7.4
CO2 SERPL-SCNC: 27 MMOL/L (ref 20–31)
CREAT SERPL-MCNC: 0.7 MG/DL (ref 0.7–1.2)
CREAT UR-MCNC: 158 MG/DL (ref 28–217)
EOSINOPHIL # BLD: 0.2 K/UL (ref 0–0.4)
EOSINOPHILS RELATIVE PERCENT: 3 % (ref 0–4)
ERYTHROCYTE [DISTWIDTH] IN BLOOD BY AUTOMATED COUNT: 14 % (ref 11.5–14.9)
FOLATE SERPL-MCNC: 11.9 NG/ML (ref 4.8–24.2)
GFR, ESTIMATED: >90 ML/MIN/1.73M2
GLUCOSE SERPL-MCNC: 103 MG/DL (ref 74–99)
HCT VFR BLD AUTO: 38.5 % (ref 36–46)
HDLC SERPL-MCNC: 34 MG/DL
HGB BLD-MCNC: 12.9 G/DL (ref 12–16)
LDLC SERPL CALC-MCNC: 164 MG/DL (ref 0–100)
LYMPHOCYTES NFR BLD: 3 K/UL (ref 1–4.8)
LYMPHOCYTES RELATIVE PERCENT: 40 % (ref 24–44)
MAGNESIUM SERPL-MCNC: 2 MG/DL (ref 1.6–2.4)
MCH RBC QN AUTO: 28 PG (ref 26–34)
MCHC RBC AUTO-ENTMCNC: 33.6 G/DL (ref 31–37)
MCV RBC AUTO: 83.4 FL (ref 80–100)
MICROALBUMIN UR-MCNC: <12 MG/L (ref 0–20)
MICROALBUMIN/CREAT UR-RTO: NORMAL MCG/MG CREAT (ref 0–25)
MONOCYTES NFR BLD: 0.6 K/UL (ref 0.1–1.3)
MONOCYTES NFR BLD: 8 % (ref 1–7)
NEUTROPHILS NFR BLD: 48 % (ref 36–66)
NEUTS SEG NFR BLD: 3.6 K/UL (ref 1.3–9.1)
PLATELET # BLD AUTO: 210 K/UL (ref 150–450)
PMV BLD AUTO: 8.5 FL (ref 6–12)
POTASSIUM SERPL-SCNC: 4.3 MMOL/L (ref 3.7–5.3)
PROT SERPL-MCNC: 6.9 G/DL (ref 6.6–8.7)
RBC # BLD AUTO: 4.61 M/UL (ref 4–5.2)
SODIUM SERPL-SCNC: 142 MMOL/L (ref 136–145)
TRIGL SERPL-MCNC: 259 MG/DL (ref 0–149)
TSH SERPL DL<=0.05 MIU/L-ACNC: 0.93 UIU/ML (ref 0.27–4.2)
URATE SERPL-MCNC: 4.5 MG/DL (ref 2.4–5.7)
VIT B12 SERPL-MCNC: 838 PG/ML (ref 232–1245)
WBC OTHER # BLD: 7.4 K/UL (ref 3.5–11)

## 2025-03-21 PROCEDURE — 83735 ASSAY OF MAGNESIUM: CPT

## 2025-03-21 PROCEDURE — 82043 UR ALBUMIN QUANTITATIVE: CPT

## 2025-03-21 PROCEDURE — 84443 ASSAY THYROID STIM HORMONE: CPT

## 2025-03-21 PROCEDURE — 80061 LIPID PANEL: CPT

## 2025-03-21 PROCEDURE — 82570 ASSAY OF URINE CREATININE: CPT

## 2025-03-21 PROCEDURE — 82746 ASSAY OF FOLIC ACID SERUM: CPT

## 2025-03-21 PROCEDURE — 84550 ASSAY OF BLOOD/URIC ACID: CPT

## 2025-03-21 PROCEDURE — 36415 COLL VENOUS BLD VENIPUNCTURE: CPT

## 2025-03-21 PROCEDURE — 80053 COMPREHEN METABOLIC PANEL: CPT

## 2025-03-21 PROCEDURE — 85025 COMPLETE CBC W/AUTO DIFF WBC: CPT

## 2025-03-21 PROCEDURE — 82306 VITAMIN D 25 HYDROXY: CPT

## 2025-03-21 PROCEDURE — 82607 VITAMIN B-12: CPT

## 2025-03-23 ENCOUNTER — RESULTS FOLLOW-UP (OUTPATIENT)
Dept: FAMILY MEDICINE CLINIC | Age: 60
End: 2025-03-23

## 2025-03-23 DIAGNOSIS — E11.42 TYPE 2 DIABETES MELLITUS WITH DIABETIC POLYNEUROPATHY, WITHOUT LONG-TERM CURRENT USE OF INSULIN (HCC): ICD-10-CM

## 2025-03-24 RX ORDER — METFORMIN HYDROCHLORIDE 500 MG/1
500 TABLET, EXTENDED RELEASE ORAL
Qty: 30 TABLET | Refills: 0 | Status: SHIPPED | OUTPATIENT
Start: 2025-03-24

## 2025-03-24 NOTE — TELEPHONE ENCOUNTER
Please Approve or Refuse.  Send to Pharmacy per Pt's Request: [facility]      Next Visit Date:  4/7/2025   Last Visit Date: 12/6/2024    Hemoglobin A1C (%)   Date Value   12/06/2024 5.8   07/19/2024 6.2   04/18/2024 6.8             ( goal A1C is < 7)   BP Readings from Last 3 Encounters:   03/10/25 (!) 160/88   02/08/25 119/77   12/06/24 138/80          (goal 120/80)  BUN   Date Value Ref Range Status   03/21/2025 9 8 - 23 mg/dL Final     Creatinine   Date Value Ref Range Status   03/21/2025 0.7 0.7 - 1.2 mg/dL Final     Potassium   Date Value Ref Range Status   03/21/2025 4.3 3.7 - 5.3 mmol/L Final

## 2025-03-25 ENCOUNTER — TELEPHONE (OUTPATIENT)
Dept: FAMILY MEDICINE CLINIC | Age: 60
End: 2025-03-25

## 2025-03-25 NOTE — TELEPHONE ENCOUNTER
To be addressed at upcoming appointment with PCP, I will place a note in her appointment desk  Future Appointments   Date Time Provider Department Center   4/7/2025 12:30 PM Jacinda Dietz MD [facility]   9/8/2025  9:30 AM Brock Higgins MD [facility]     Testing Consideration: Renal Function Testing in Diabetes Your patient has diabetes and may not be receiving routine renal function testing based on available claims records. Please consider monitoring for diabetic kidney disease, which occurs in 20-40% of patients with diabetes. Guidelines recommend assessing urinary albumin and eGFR at least annually in patients with type 1 diabetes for 5 or more years and in all patients with type 2 diabetes, regardless of treatment. Those with renal manifestations should be monitored up to 4 times per year to guide therapy

## 2025-04-22 ENCOUNTER — OFFICE VISIT (OUTPATIENT)
Dept: FAMILY MEDICINE CLINIC | Age: 60
End: 2025-04-22
Payer: COMMERCIAL

## 2025-04-22 VITALS
BODY MASS INDEX: 32.15 KG/M2 | DIASTOLIC BLOOD PRESSURE: 88 MMHG | HEART RATE: 61 BPM | OXYGEN SATURATION: 98 % | SYSTOLIC BLOOD PRESSURE: 138 MMHG | HEIGHT: 65 IN | WEIGHT: 193 LBS

## 2025-04-22 DIAGNOSIS — I25.83 CORONARY ARTERY DISEASE DUE TO LIPID RICH PLAQUE: ICD-10-CM

## 2025-04-22 DIAGNOSIS — Z12.31 ENCOUNTER FOR SCREENING MAMMOGRAM FOR BREAST CANCER: ICD-10-CM

## 2025-04-22 DIAGNOSIS — J44.9 MILD CHRONIC OBSTRUCTIVE PULMONARY DISEASE (HCC): ICD-10-CM

## 2025-04-22 DIAGNOSIS — E11.42 TYPE 2 DIABETES MELLITUS WITH DIABETIC POLYNEUROPATHY, WITHOUT LONG-TERM CURRENT USE OF INSULIN (HCC): Primary | ICD-10-CM

## 2025-04-22 DIAGNOSIS — I25.10 CORONARY ARTERY DISEASE DUE TO LIPID RICH PLAQUE: ICD-10-CM

## 2025-04-22 DIAGNOSIS — R51.9 NEW ONSET OF HEADACHES: ICD-10-CM

## 2025-04-22 DIAGNOSIS — M51.361 DEGENERATION OF INTERVERTEBRAL DISC OF LUMBAR REGION WITH LOWER EXTREMITY PAIN: ICD-10-CM

## 2025-04-22 DIAGNOSIS — E78.2 MIXED HYPERLIPIDEMIA: ICD-10-CM

## 2025-04-22 DIAGNOSIS — E55.9 VITAMIN D DEFICIENCY: ICD-10-CM

## 2025-04-22 DIAGNOSIS — Z78.0 POST-MENOPAUSAL: ICD-10-CM

## 2025-04-22 DIAGNOSIS — Z23 ENCOUNTER FOR IMMUNIZATION: ICD-10-CM

## 2025-04-22 DIAGNOSIS — I10 PRIMARY HYPERTENSION: ICD-10-CM

## 2025-04-22 DIAGNOSIS — B35.1 ONYCHOMYCOSIS: ICD-10-CM

## 2025-04-22 LAB — HBA1C MFR BLD: 6 %

## 2025-04-22 PROCEDURE — 3044F HG A1C LEVEL LT 7.0%: CPT | Performed by: FAMILY MEDICINE

## 2025-04-22 PROCEDURE — 3017F COLORECTAL CA SCREEN DOC REV: CPT | Performed by: FAMILY MEDICINE

## 2025-04-22 PROCEDURE — G8427 DOCREV CUR MEDS BY ELIG CLIN: HCPCS | Performed by: FAMILY MEDICINE

## 2025-04-22 PROCEDURE — G8417 CALC BMI ABV UP PARAM F/U: HCPCS | Performed by: FAMILY MEDICINE

## 2025-04-22 PROCEDURE — 3023F SPIROM DOC REV: CPT | Performed by: FAMILY MEDICINE

## 2025-04-22 PROCEDURE — 4004F PT TOBACCO SCREEN RCVD TLK: CPT | Performed by: FAMILY MEDICINE

## 2025-04-22 PROCEDURE — 99215 OFFICE O/P EST HI 40 MIN: CPT | Performed by: FAMILY MEDICINE

## 2025-04-22 PROCEDURE — 90677 PCV20 VACCINE IM: CPT | Performed by: FAMILY MEDICINE

## 2025-04-22 PROCEDURE — 2022F DILAT RTA XM EVC RTNOPTHY: CPT | Performed by: FAMILY MEDICINE

## 2025-04-22 PROCEDURE — 3079F DIAST BP 80-89 MM HG: CPT | Performed by: FAMILY MEDICINE

## 2025-04-22 PROCEDURE — 90471 IMMUNIZATION ADMIN: CPT | Performed by: FAMILY MEDICINE

## 2025-04-22 PROCEDURE — 3075F SYST BP GE 130 - 139MM HG: CPT | Performed by: FAMILY MEDICINE

## 2025-04-22 PROCEDURE — 83036 HEMOGLOBIN GLYCOSYLATED A1C: CPT | Performed by: FAMILY MEDICINE

## 2025-04-22 RX ORDER — BUDESONIDE AND FORMOTEROL FUMARATE DIHYDRATE 160; 4.5 UG/1; UG/1
2 AEROSOL RESPIRATORY (INHALATION) 2 TIMES DAILY
Qty: 1 EACH | Refills: 1 | Status: SHIPPED | OUTPATIENT
Start: 2025-04-22 | End: 2025-05-22

## 2025-04-22 RX ORDER — FLUTICASONE PROPIONATE 50 MCG
1 SPRAY, SUSPENSION (ML) NASAL DAILY
Qty: 32 G | Refills: 1 | Status: SHIPPED | OUTPATIENT
Start: 2025-04-22

## 2025-04-22 RX ORDER — CETIRIZINE HYDROCHLORIDE 10 MG/1
10 TABLET ORAL DAILY
Qty: 30 TABLET | Refills: 0 | Status: SHIPPED | OUTPATIENT
Start: 2025-04-22

## 2025-04-22 RX ORDER — ERGOCALCIFEROL 1.25 MG/1
50000 CAPSULE, LIQUID FILLED ORAL WEEKLY
Qty: 12 CAPSULE | Refills: 1 | Status: SHIPPED | OUTPATIENT
Start: 2025-04-22

## 2025-04-22 RX ORDER — METFORMIN HYDROCHLORIDE 500 MG/1
500 TABLET, EXTENDED RELEASE ORAL
Qty: 90 TABLET | Refills: 1 | Status: SHIPPED | OUTPATIENT
Start: 2025-04-22

## 2025-04-22 RX ORDER — CICLOPIROX 80 MG/ML
SOLUTION TOPICAL
Qty: 6 ML | Refills: 1 | Status: SHIPPED | OUTPATIENT
Start: 2025-04-22

## 2025-04-22 RX ORDER — IBUPROFEN 600 MG/1
600 TABLET, FILM COATED ORAL 2 TIMES DAILY PRN
Qty: 30 TABLET | Refills: 0 | Status: SHIPPED | OUTPATIENT
Start: 2025-04-22

## 2025-04-22 RX ORDER — ROSUVASTATIN CALCIUM 40 MG/1
40 TABLET, COATED ORAL NIGHTLY
Qty: 90 TABLET | Refills: 1 | Status: SHIPPED | OUTPATIENT
Start: 2025-04-22

## 2025-04-22 SDOH — ECONOMIC STABILITY: FOOD INSECURITY: WITHIN THE PAST 12 MONTHS, THE FOOD YOU BOUGHT JUST DIDN'T LAST AND YOU DIDN'T HAVE MONEY TO GET MORE.: NEVER TRUE

## 2025-04-22 SDOH — ECONOMIC STABILITY: FOOD INSECURITY: WITHIN THE PAST 12 MONTHS, YOU WORRIED THAT YOUR FOOD WOULD RUN OUT BEFORE YOU GOT MONEY TO BUY MORE.: NEVER TRUE

## 2025-04-22 ASSESSMENT — ENCOUNTER SYMPTOMS
SHORTNESS OF BREATH: 0
DIARRHEA: 0
WHEEZING: 0
SORE THROAT: 0
RHINORRHEA: 0
RECTAL PAIN: 0
COUGH: 0
BACK PAIN: 1
NAUSEA: 0
TROUBLE SWALLOWING: 0
ABDOMINAL DISTENTION: 0
STRIDOR: 0
ABDOMINAL PAIN: 0
EYE REDNESS: 0
BLOOD IN STOOL: 0
CHEST TIGHTNESS: 0
VOMITING: 0
COLOR CHANGE: 0
SINUS PRESSURE: 1
CONSTIPATION: 0

## 2025-04-22 ASSESSMENT — PATIENT HEALTH QUESTIONNAIRE - PHQ9
SUM OF ALL RESPONSES TO PHQ QUESTIONS 1-9: 0
1. LITTLE INTEREST OR PLEASURE IN DOING THINGS: NOT AT ALL
2. FEELING DOWN, DEPRESSED OR HOPELESS: NOT AT ALL
SUM OF ALL RESPONSES TO PHQ QUESTIONS 1-9: 0

## 2025-04-22 NOTE — PROGRESS NOTES
Chief Complaint   Patient presents with    Diabetes    Other     Discuss statin     The patient (or guardian, if applicable) and other individuals in attendance with the patient were advised that Artificial Intelligence will be utilized during this visit to record, process the conversation to generate a clinical note, and support improvement of the AI technology. The patient (or guardian, if applicable) and other individuals in attendance at the appointment consented to the use of AI, including the recording.                    Diabetes and Other (Discuss statin)      History of Present Illness  The patient presents for a follow-up on chronic medical problems and to discuss her diabetes. She is accompanied by her daughter.      Type 2 diabetes, A1c is stable, slight increased to 6.  Patient is currently on metformin.  Foot exam done today advised to schedule appointment with ophthalmologist.  Severe headaches upon awakening have been reported, necessitating hospital visits. These headaches have been present for an extended period but have only become significantly bothersome in the past 6 months. Pain management has been attempted with Tylenol, providing partial relief. Ibuprofen has not been tried. Additionally, constant nasal discharge and occasional balance issues are reported, which she attributes to clumsiness.  Patient does report sinus symptoms, never tried any medications.  She never any MRI brain done.    Consultation with an ophthalmologist has not occurred due to work commitments. A history of stent placement in the heart is noted, and a stress test was conducted during the last hospital visit. Currently, she is on losartan 50 mg, increased from 25 mg by her cardiologist due to persistently elevated blood pressure.  Crestor 20 mg is also being taken.  Recent blood work is showing hyperlipidemia, uncontrolled discussed increasing statins to 40 mg.    Postmenopausal status has been present for several

## 2025-04-22 NOTE — PROGRESS NOTES
Visit Information    Have you changed or started any medications since your last visit including any over-the-counter medicines, vitamins, or herbal medicines? no   Are you having any side effects from any of your medications? -  no  Have you stopped taking any of your medications? Is so, why? -  no    Have you seen any other physician or provider since your last visit? No  Have you had any other diagnostic tests since your last visit? No  Have you been seen in the emergency room and/or had an admission to a hospital since we last saw you? No  Have you had your routine dental cleaning in the past 6 months? no    Have you activated your [employer] account? If not, what are your barriers? Yes     Patient Care Team:  Jacinda Dietz MD as PCP - General (Family Medicine)  Jacinda Dietz MD as PCP - Empaneled Provider  Magen Montez DO as Consulting Physician (Obstetrics & Gynecology)    Medical History Review  Past Medical, Family, and Social History reviewed and does contribute to the patient presenting condition    Health Maintenance   Topic Date Due    Diabetic retinal exam  Never done    DTaP/Tdap/Td vaccine (1 - Tdap) Never done    Shingles vaccine (1 of 2) Never done    Pneumococcal 50+ years Vaccine (2 of 2 - PCV) 10/22/2022    COVID-19 Vaccine (3 - 2024-25 season) 09/01/2024    Respiratory Syncytial Virus (RSV) Pregnant or age 60 yrs+ (1 - Risk 60-74 years 1-dose series) Never done    A1C test (Diabetic or Prediabetic)  03/06/2025    Diabetic foot exam  04/18/2025    Depression Screen  07/19/2025    Flu vaccine (Season Ended) 08/01/2025    Colorectal Cancer Screen  08/27/2025    Lung Cancer Screening &/or Counseling  02/07/2026    Diabetic Alb to Cr ratio (uACR) test  03/21/2026    Lipids  03/21/2026    GFR test (Diabetes, CKD 3-4, OR last GFR 15-59)  03/21/2026    Breast cancer screen  08/02/2026    Hepatitis C screen  Completed    HIV screen  Completed    Hepatitis A vaccine  Aged Out    Hepatitis B

## 2025-05-17 DIAGNOSIS — R51.9 NEW ONSET OF HEADACHES: ICD-10-CM

## 2025-05-19 RX ORDER — CETIRIZINE HYDROCHLORIDE 10 MG/1
10 TABLET ORAL DAILY
Qty: 30 TABLET | Refills: 0 | Status: SHIPPED | OUTPATIENT
Start: 2025-05-19

## 2025-05-19 NOTE — TELEPHONE ENCOUNTER
Please Approve or Refuse.  Send to Pharmacy per Pt's Request:      Next Visit Date:  5/22/2025   Last Visit Date: 4/22/2025    Hemoglobin A1C (%)   Date Value   04/22/2025 6.0   12/06/2024 5.8   07/19/2024 6.2             ( goal A1C is < 7)   BP Readings from Last 3 Encounters:   04/22/25 138/88   03/10/25 (!) 160/88   02/08/25 119/77          (goal 120/80)  BUN   Date Value Ref Range Status   03/21/2025 9 8 - 23 mg/dL Final     Creatinine   Date Value Ref Range Status   03/21/2025 0.7 0.7 - 1.2 mg/dL Final     Potassium   Date Value Ref Range Status   03/21/2025 4.3 3.7 - 5.3 mmol/L Final

## 2025-05-22 ENCOUNTER — OFFICE VISIT (OUTPATIENT)
Dept: FAMILY MEDICINE CLINIC | Age: 60
End: 2025-05-22
Payer: COMMERCIAL

## 2025-05-22 VITALS
DIASTOLIC BLOOD PRESSURE: 80 MMHG | WEIGHT: 196 LBS | HEIGHT: 65 IN | BODY MASS INDEX: 32.65 KG/M2 | OXYGEN SATURATION: 97 % | HEART RATE: 69 BPM | SYSTOLIC BLOOD PRESSURE: 130 MMHG

## 2025-05-22 DIAGNOSIS — R51.9 NEW ONSET OF HEADACHES: Primary | ICD-10-CM

## 2025-05-22 DIAGNOSIS — J32.4 CHRONIC PANSINUSITIS: ICD-10-CM

## 2025-05-22 DIAGNOSIS — R26.89 BALANCE PROBLEMS: ICD-10-CM

## 2025-05-22 PROCEDURE — G8417 CALC BMI ABV UP PARAM F/U: HCPCS | Performed by: FAMILY MEDICINE

## 2025-05-22 PROCEDURE — 99214 OFFICE O/P EST MOD 30 MIN: CPT | Performed by: FAMILY MEDICINE

## 2025-05-22 PROCEDURE — 3075F SYST BP GE 130 - 139MM HG: CPT | Performed by: FAMILY MEDICINE

## 2025-05-22 PROCEDURE — 4004F PT TOBACCO SCREEN RCVD TLK: CPT | Performed by: FAMILY MEDICINE

## 2025-05-22 PROCEDURE — 3079F DIAST BP 80-89 MM HG: CPT | Performed by: FAMILY MEDICINE

## 2025-05-22 PROCEDURE — G8427 DOCREV CUR MEDS BY ELIG CLIN: HCPCS | Performed by: FAMILY MEDICINE

## 2025-05-22 PROCEDURE — 3017F COLORECTAL CA SCREEN DOC REV: CPT | Performed by: FAMILY MEDICINE

## 2025-05-22 ASSESSMENT — ENCOUNTER SYMPTOMS
BLOOD IN STOOL: 0
SINUS PRESSURE: 0
NAUSEA: 0
DIARRHEA: 0
SORE THROAT: 0
COLOR CHANGE: 0
EYE REDNESS: 0
STRIDOR: 0
CONSTIPATION: 0
CHEST TIGHTNESS: 0
ABDOMINAL DISTENTION: 0
BACK PAIN: 0
SHORTNESS OF BREATH: 0
WHEEZING: 0
VOMITING: 0
RHINORRHEA: 0
COUGH: 0
ABDOMINAL PAIN: 0
TROUBLE SWALLOWING: 0
RECTAL PAIN: 0

## 2025-05-22 NOTE — PROGRESS NOTES
Visit Information    Have you changed or started any medications since your last visit including any over-the-counter medicines, vitamins, or herbal medicines? no   Are you having any side effects from any of your medications? -  no  Have you stopped taking any of your medications? Is so, why? -  no    Have you seen any other physician or provider since your last visit? No  Have you had any other diagnostic tests since your last visit? No  Have you been seen in the emergency room and/or had an admission to a hospital since we last saw you? No  Have you had your routine dental cleaning in the past 6 months? no    Have you activated your [employer] account? If not, what are your barriers? Yes     Patient Care Team:  Jacinda Dietz MD as PCP - General (Family Medicine)  [facility]  Magen Montez DO as Consulting Physician (Obstetrics & Gynecology)    Medical History Review  Past Medical, Family, and Social History reviewed and does contribute to the patient presenting condition    Health Maintenance   Topic Date Due    Diabetic retinal exam  Never done    DTaP/Tdap/Td vaccine (1 - Tdap) Never done    Shingles vaccine (1 of 2) Never done    COVID-19 Vaccine (3 - 2024-25 season) 09/01/2024    Respiratory Syncytial Virus (RSV) Pregnant or age 60 yrs+ (1 - Risk 60-74 years 1-dose series) Never done    A1C test (Diabetic or Prediabetic)  07/22/2025    Flu vaccine (Season Ended) 08/01/2025    Colorectal Cancer Screen  08/27/2025    Lung Cancer Screening &/or Counseling  02/07/2026    Diabetic Alb to Cr ratio (uACR) test  03/21/2026    Lipids  03/21/2026    GFR test (Diabetes, CKD 3-4, OR last GFR 15-59)  03/21/2026    Diabetic foot exam  04/22/2026    Depression Screen  04/22/2026    Breast cancer screen  08/02/2026    Pneumococcal 50+ years Vaccine  Completed    Hepatitis C screen  Completed    HIV screen  Completed    Hepatitis A vaccine  Aged Out    Hepatitis B vaccine  Aged Out 
facility-administered medications for this visit.         Social History     Socioeconomic History    Marital status:      Spouse name: Not on file    Number of children: Not on file    Years of education: Not on file    Highest education level: Not on file   Occupational History    Not on file   Tobacco Use    Smoking status: Some Days     Current packs/day: 1.00     Average packs/day: 1 pack/day for 43.4 years (43.4 ttl pk-yrs)     Types: Cigarettes     Start date: 1/1/1982    Smokeless tobacco: Never   Vaping Use    Vaping status: Never Used   Substance and Sexual Activity    Alcohol use: Not Currently     Comment: OCC    Drug use: No    Sexual activity: Not Currently     Partners: Male     Birth control/protection: Surgical     Comment: hyst   Other Topics Concern    Not on file   Social History Narrative    Not on file     Social Drivers of Health     Financial Resource Strain: Low Risk  (7/19/2024)    Overall Financial Resource Strain (CARDIA)     Difficulty of Paying Living Expenses: Not hard at all   Food Insecurity: No Food Insecurity (4/22/2025)    Hunger Vital Sign     Worried About Running Out of Food in the Last Year: Never true     Ran Out of Food in the Last Year: Never true   Transportation Needs: No Transportation Needs (4/22/2025)    PRAPARE - Transportation     Lack of Transportation (Medical): No     Lack of Transportation (Non-Medical): No   Physical Activity: Insufficiently Active (4/22/2023)    Exercise Vital Sign     Days of Exercise per Week: 2 days     Minutes of Exercise per Session: 10 min   Stress: Not on file   Social Connections: Not on file   Intimate Partner Violence: Not At Risk (4/22/2023)    Humiliation, Afraid, Rape, and Kick questionnaire     Fear of Current or Ex-Partner: No     Emotionally Abused: No     Physically Abused: No     Sexually Abused: No   Housing Stability: Low Risk  (4/22/2025)    Housing Stability Vital Sign     Unable to Pay for Housing in the Last Year:

## 2025-05-30 DIAGNOSIS — I25.10 CORONARY ARTERY DISEASE DUE TO LIPID RICH PLAQUE: ICD-10-CM

## 2025-05-30 DIAGNOSIS — I20.2 REFRACTORY ANGINA PECTORIS: ICD-10-CM

## 2025-05-30 DIAGNOSIS — G89.29 CHRONIC MIDLINE LOW BACK PAIN WITHOUT SCIATICA: ICD-10-CM

## 2025-05-30 DIAGNOSIS — I25.83 CORONARY ARTERY DISEASE DUE TO LIPID RICH PLAQUE: ICD-10-CM

## 2025-05-30 DIAGNOSIS — M17.0 ARTHRITIS OF BOTH KNEES: ICD-10-CM

## 2025-05-30 DIAGNOSIS — M54.50 CHRONIC MIDLINE LOW BACK PAIN WITHOUT SCIATICA: ICD-10-CM

## 2025-05-30 RX ORDER — RANOLAZINE 500 MG/1
500 TABLET, EXTENDED RELEASE ORAL 2 TIMES DAILY
Qty: 60 TABLET | Refills: 3 | Status: SHIPPED | OUTPATIENT
Start: 2025-05-30

## 2025-05-30 RX ORDER — ERGOCALCIFEROL 1.25 MG/1
50000 CAPSULE, LIQUID FILLED ORAL WEEKLY
Qty: 12 CAPSULE | Refills: 1 | Status: SHIPPED | OUTPATIENT
Start: 2025-05-30

## 2025-05-30 RX ORDER — ACETAMINOPHEN 500 MG
500 TABLET ORAL EVERY 6 HOURS PRN
Qty: 120 TABLET | Refills: 0 | Status: SHIPPED | OUTPATIENT
Start: 2025-05-30 | End: 2025-06-29

## 2025-06-11 ENCOUNTER — HOSPITAL ENCOUNTER (OUTPATIENT)
Dept: MRI IMAGING | Age: 60
Discharge: HOME | End: 2025-06-13
Payer: COMMERCIAL

## 2025-06-11 DIAGNOSIS — R51.9 NEW ONSET OF HEADACHES: ICD-10-CM

## 2025-06-11 DIAGNOSIS — R26.89 BALANCE PROBLEMS: ICD-10-CM

## 2025-06-11 LAB
BUN BLD-MCNC: 15 MG/DL (ref 8–26)
EGFR, POC: 84 ML/MIN/1.73M2
POC CREATININE: 0.8 MG/DL (ref 0.51–1.19)

## 2025-06-11 PROCEDURE — 84520 ASSAY OF UREA NITROGEN: CPT

## 2025-06-11 PROCEDURE — 6360000004 HC RX CONTRAST MEDICATION: Performed by: FAMILY MEDICINE

## 2025-06-11 PROCEDURE — 70553 MRI BRAIN STEM W/O & W/DYE: CPT

## 2025-06-11 PROCEDURE — 82565 ASSAY OF CREATININE: CPT

## 2025-06-11 PROCEDURE — 2500000003 HC RX 250 WO HCPCS: Performed by: FAMILY MEDICINE

## 2025-06-11 PROCEDURE — A9576 INJ PROHANCE MULTIPACK: HCPCS | Performed by: FAMILY MEDICINE

## 2025-06-11 RX ORDER — GADOTERIDOL 279.3 MG/ML
16 INJECTION INTRAVENOUS
Status: COMPLETED | OUTPATIENT
Start: 2025-06-11 | End: 2025-06-11

## 2025-06-11 RX ORDER — SODIUM CHLORIDE 0.9 % (FLUSH) 0.9 %
10 SYRINGE (ML) INJECTION PRN
Status: DISCONTINUED | OUTPATIENT
Start: 2025-06-11 | End: 2025-06-14 | Stop reason: HOSPADM

## 2025-06-11 RX ADMIN — GADOTERIDOL 16 ML: 279.3 INJECTION, SOLUTION INTRAVENOUS at 07:56

## 2025-06-11 RX ADMIN — SODIUM CHLORIDE, PRESERVATIVE FREE 10 ML: 5 INJECTION INTRAVENOUS at 07:56

## 2025-06-17 ENCOUNTER — RESULTS FOLLOW-UP (OUTPATIENT)
Dept: FAMILY MEDICINE CLINIC | Age: 60
End: 2025-06-17

## 2025-08-04 ENCOUNTER — HOSPITAL ENCOUNTER (OUTPATIENT)
Dept: WOMENS IMAGING | Age: 60
Discharge: HOME | End: 2025-08-06
Payer: COMMERCIAL

## 2025-08-04 VITALS — WEIGHT: 196 LBS | BODY MASS INDEX: 32.65 KG/M2 | HEIGHT: 65 IN

## 2025-08-04 DIAGNOSIS — Z12.31 ENCOUNTER FOR SCREENING MAMMOGRAM FOR BREAST CANCER: ICD-10-CM

## 2025-08-04 DIAGNOSIS — Z78.0 POST-MENOPAUSAL: ICD-10-CM

## 2025-08-04 PROCEDURE — 77080 DXA BONE DENSITY AXIAL: CPT

## 2025-08-04 PROCEDURE — 77063 BREAST TOMOSYNTHESIS BI: CPT
